# Patient Record
Sex: MALE | Race: WHITE | NOT HISPANIC OR LATINO
[De-identification: names, ages, dates, MRNs, and addresses within clinical notes are randomized per-mention and may not be internally consistent; named-entity substitution may affect disease eponyms.]

---

## 2017-07-06 ENCOUNTER — RESULT REVIEW (OUTPATIENT)
Age: 37
End: 2017-07-06

## 2017-07-06 ENCOUNTER — LABORATORY RESULT (OUTPATIENT)
Age: 37
End: 2017-07-06

## 2017-07-07 ENCOUNTER — APPOINTMENT (OUTPATIENT)
Dept: UROLOGY | Facility: CLINIC | Age: 37
End: 2017-07-07

## 2017-07-07 VITALS
TEMPERATURE: 98.1 F | DIASTOLIC BLOOD PRESSURE: 85 MMHG | HEIGHT: 68 IN | BODY MASS INDEX: 28.04 KG/M2 | OXYGEN SATURATION: 98 % | HEART RATE: 75 BPM | SYSTOLIC BLOOD PRESSURE: 154 MMHG | WEIGHT: 185 LBS

## 2017-07-14 ENCOUNTER — APPOINTMENT (OUTPATIENT)
Dept: UROLOGY | Facility: CLINIC | Age: 37
End: 2017-07-14

## 2017-10-02 ENCOUNTER — APPOINTMENT (OUTPATIENT)
Dept: UROLOGY | Facility: CLINIC | Age: 37
End: 2017-10-02
Payer: COMMERCIAL

## 2017-10-02 DIAGNOSIS — N45.1 EPIDIDYMITIS: ICD-10-CM

## 2017-10-02 DIAGNOSIS — Z30.2 ENCOUNTER FOR STERILIZATION: ICD-10-CM

## 2017-10-02 PROCEDURE — 99024 POSTOP FOLLOW-UP VISIT: CPT

## 2018-02-05 ENCOUNTER — TRANSCRIPTION ENCOUNTER (OUTPATIENT)
Age: 38
End: 2018-02-05

## 2018-03-30 ENCOUNTER — TRANSCRIPTION ENCOUNTER (OUTPATIENT)
Age: 38
End: 2018-03-30

## 2020-06-23 ENCOUNTER — APPOINTMENT (OUTPATIENT)
Dept: RHEUMATOLOGY | Facility: CLINIC | Age: 40
End: 2020-06-23
Payer: COMMERCIAL

## 2020-06-23 VITALS
TEMPERATURE: 98.3 F | HEART RATE: 85 BPM | SYSTOLIC BLOOD PRESSURE: 100 MMHG | HEIGHT: 68 IN | WEIGHT: 190 LBS | DIASTOLIC BLOOD PRESSURE: 72 MMHG | BODY MASS INDEX: 28.79 KG/M2 | OXYGEN SATURATION: 96 %

## 2020-06-23 DIAGNOSIS — Z84.0 FAMILY HISTORY OF DISEASES OF THE SKIN AND SUBCUTANEOUS TISSUE: ICD-10-CM

## 2020-06-23 DIAGNOSIS — Z82.69 FAMILY HISTORY OF OTHER DISEASES OF THE MUSCULOSKELETAL SYSTEM AND CONNECTIVE TISSUE: ICD-10-CM

## 2020-06-23 PROCEDURE — 99244 OFF/OP CNSLTJ NEW/EST MOD 40: CPT

## 2020-06-23 RX ORDER — DOXYCYCLINE 100 MG/1
100 TABLET, FILM COATED ORAL
Qty: 14 | Refills: 0 | Status: DISCONTINUED | COMMUNITY
Start: 2017-07-07 | End: 2020-06-23

## 2020-06-23 RX ORDER — DOXYCYCLINE 100 MG/1
100 TABLET, FILM COATED ORAL
Qty: 20 | Refills: 0 | Status: DISCONTINUED | COMMUNITY
Start: 2017-10-02 | End: 2020-06-23

## 2020-06-23 RX ORDER — OXYCODONE AND ACETAMINOPHEN 5; 325 MG/1; MG/1
5-325 TABLET ORAL
Qty: 12 | Refills: 0 | Status: DISCONTINUED | COMMUNITY
Start: 2017-07-07 | End: 2020-06-23

## 2020-06-24 PROBLEM — Z84.0 FAMILY HISTORY OF PSORIASIS: Status: ACTIVE | Noted: 2020-06-24

## 2020-06-24 PROBLEM — Z82.69 FAMILY HISTORY OF GOUT: Status: ACTIVE | Noted: 2020-06-24

## 2020-06-24 RX ORDER — HYDROCHLOROTHIAZIDE 12.5 MG/1
12.5 TABLET ORAL DAILY
Refills: 0 | Status: ACTIVE | COMMUNITY

## 2020-06-24 RX ORDER — LISINOPRIL 20 MG/1
20 TABLET ORAL
Qty: 180 | Refills: 3 | Status: ACTIVE | COMMUNITY

## 2020-06-24 RX ORDER — METHYLPREDNISOLONE 4 MG/1
4 TABLET ORAL
Refills: 0 | Status: DISCONTINUED | COMMUNITY
End: 2020-06-24

## 2020-06-24 NOTE — PHYSICAL EXAM
[General Appearance - In No Acute Distress] : in no acute distress [General Appearance - Alert] : alert [General Appearance - Well Nourished] : well nourished [General Appearance - Well-Appearing] : healthy appearing [General Appearance - Well Developed] : well developed [PERRL With Normal Accommodation] : pupils were equal in size, round, and reactive to light [Extraocular Movements] : extraocular movements were intact [Sclera] : the sclera and conjunctiva were normal [Oropharynx] : the oropharynx was normal [Outer Ear] : the ears and nose were normal in appearance [Neck Appearance] : the appearance of the neck was normal [Neck Cervical Mass (___cm)] : no neck mass was observed [Heart Sounds] : normal S1 and S2 [Heart Rate And Rhythm] : heart rate was normal and rhythm regular [Auscultation Breath Sounds / Voice Sounds] : lungs were clear to auscultation bilaterally [Murmurs] : no murmurs [Heart Sounds Gallop] : no gallops [Bowel Sounds] : normal bowel sounds [Heart Sounds Pericardial Friction Rub] : no pericardial rub [Abdomen Tenderness] : non-tender [Abdomen Soft] : soft [Abdomen Mass (___ Cm)] : no abdominal mass palpated [Cervical Lymph Nodes Enlarged Posterior Bilaterally] : posterior cervical [Cervical Lymph Nodes Enlarged Anterior Bilaterally] : anterior cervical [Supraclavicular Lymph Nodes Enlarged Bilaterally] : supraclavicular [No CVA Tenderness] : no ~M costovertebral angle tenderness [No Spinal Tenderness] : no spinal tenderness [Abnormal Walk] : normal gait [Nail Clubbing] : no clubbing  or cyanosis of the fingernails [Musculoskeletal - Swelling] : no joint swelling seen [Motor Tone] : muscle strength and tone were normal [Skin Color & Pigmentation] : normal skin color and pigmentation [Skin Turgor] : normal skin turgor [] : no rash [Sensation] : the sensory exam was normal to light touch and pinprick [No Focal Deficits] : no focal deficits [Deep Tendon Reflexes (DTR)] : deep tendon reflexes were 2+ and symmetric [FreeTextEntry1] : 5/5 str [Oriented To Time, Place, And Person] : oriented to person, place, and time [Affect] : the affect was normal [Impaired Insight] : insight and judgment were intact

## 2020-06-24 NOTE — HISTORY OF PRESENT ILLNESS
[FreeTextEntry1] : 40 yo .. w/ new onset severe pain and swelling in R 1st MTP recurrently in past month.  \par Immediate and nearly full resolution with steroids.  Started on Colchicine once daily, but pain/ swelling returned, another course of steroids needed.  Initial episode 1 m ago... \par Hx in past 2 months sudden onset L shoulder pain/ stiffness spontaneous onset and resolution within 1 wks- MRI + subacromial bursitis.. \par Similar sudden onset R lateral hip pain severe for several days\par ROS otherwise denies inflammatory eye, back, bowel or rash. Denies renal calculi, tophi \par nail deformities.  No constitutional sx and Wt stable for past 15 lbs\par .  \par FH significant for:  \par - gout + FH father and MGM.. \par - Psoriasis:  brother, 1st cousin \par - T2DM both parents \par \par PMH:  \par HTN mild, well controlled \par On HCTZ/ lisinopril for HTN well controlled \par

## 2020-06-24 NOTE — ASSESSMENT
[FreeTextEntry1] : 40 yo .. w/ new onset severe pain and swelling in R 1st MTP recurrently in past month.  \par \par 1) Inflammatory arthropathy:  monoarticular pattern new onset 4/20... 1st episode R 1st MTP but also L shoulder and R lateral hip.  FH significant for both Gout and PsA.. No evidence of psoriasis, inflammatory eye, back  or nail deformities.  No tophi \par Until recently denies recurrent enthesopathies.  \par Baseline labs 6/20 UA 7.0 nl CMP.  \par NOTE: \par Hx in past 2 months sudden onset L shoulder pain/ stiffness spontaneous onset and resolution within 1 wks- MRI + subacromial bursitis.. \par Similar sudden onset R lateral hip pain severe for several days\par \par DIscussion:  unclear if this is gout or PsA sin psoriasis.  Will monitor over time.  Both would respond to steroids but definitive tx is dependent on underlying etiology.  For short term will use steroids as needed, track frequency and get updated labs when not flaring... as UA is often lower in middle of gout attack.  \par Hx significant for possible metabolic syndrome w/ HTN- will assess for T2DM/ and lipids which would support gouty arthropathy.  \par \par \par Plan: \par - Prednisone 20mg tabs 2 tabs for 1-5 days and call office to let me know.. Never more than 5 days and not more often than once mnth\par \par - for now continue colchicine once daily \par \par - labs prior to next visit \par \par - return in 1 month\par \par - sign up for Follow My Health..

## 2020-06-24 NOTE — REVIEW OF SYSTEMS
[As Noted in HPI] : as noted in HPI [Joint Pain] : joint pain [Joint Swelling] : joint swelling [Joint Stiffness] : joint stiffness [Negative] : Endocrine

## 2020-06-24 NOTE — CONSULT LETTER
[Dear  ___] : Dear  [unfilled], [Consult Letter:] : I had the pleasure of evaluating your patient, [unfilled]. [Consult Closing:] : Thank you very much for allowing me to participate in the care of this patient.  If you have any questions, please do not hesitate to contact me. [Sincerely,] : Sincerely, [FreeTextEntry2] : Bryan Reilly MD  [FreeTextEntry3] : Angie Leslie DNP, ANP-C\par Division or Rheumatology\par [facility]\par \par  [FreeTextEntry1] : Please see below for summary of  recent rheumatology evaluation and recommendations.\par \par 40 yo .. w/ new onset severe pain and swelling in R 1st MTP recurrently in past month.  \par \par 1) Inflammatory arthropathy:  monoarticular pattern new onset 4/20... 1st episode R 1st MTP but also L shoulder and R lateral hip.  FH significant for both Gout and PsA.. No evidence of psoriasis, inflammatory eye, back  or nail deformities.  No tophi \par Until recently denies recurrent enthesopathies.  \par Baseline labs 6/20 UA 7.0 nl CMP.  \par NOTE: \par Hx in past 2 months sudden onset L shoulder pain/ stiffness spontaneous onset and resolution within 1 wks- MRI + subacromial bursitis.. \par Similar sudden onset R lateral hip pain severe for several days\par \par DIscussion:  unclear if this is gout or PsA sin psoriasis.  Will monitor over time.  Both would respond to steroids but definitive tx is dependent on underlying etiology.  For short term will use steroids as needed, track frequency and get updated labs when not flaring... as UA is often lower in middle of gout attack.  \par Hx significant for possible metabolic syndrome w/ HTN- will assess for T2DM/ and lipids which would support gouty arthropathy.  \par \par \par Plan: \par - Prednisone 20mg tabs 2 tabs for 1-5 days and call office to let me know.. Never more than 5 days and not more often than once mnth\par \par - for now continue colchicine once daily \par \par - labs prior to next visit \par \par - return in 1 month\par \par - sign up for Follow My Health..

## 2020-06-27 ENCOUNTER — APPOINTMENT (OUTPATIENT)
Dept: DISASTER EMERGENCY | Facility: CLINIC | Age: 40
End: 2020-06-27

## 2020-06-27 DIAGNOSIS — Z01.818 ENCOUNTER FOR OTHER PREPROCEDURAL EXAMINATION: ICD-10-CM

## 2020-06-28 LAB — SARS-COV-2 N GENE NPH QL NAA+PROBE: NOT DETECTED

## 2020-07-13 ENCOUNTER — APPOINTMENT (OUTPATIENT)
Dept: UROLOGY | Facility: CLINIC | Age: 40
End: 2020-07-13

## 2020-07-27 LAB
ALBUMIN SERPL ELPH-MCNC: 5 G/DL
ALP BLD-CCNC: 69 U/L
ALT SERPL-CCNC: 59 U/L
ANION GAP SERPL CALC-SCNC: 16 MMOL/L
APPEARANCE: CLEAR
AST SERPL-CCNC: 26 U/L
BASOPHILS # BLD AUTO: 0.04 K/UL
BASOPHILS NFR BLD AUTO: 0.8 %
BILIRUB SERPL-MCNC: 0.4 MG/DL
BILIRUBIN URINE: NEGATIVE
BLOOD URINE: NEGATIVE
BUN SERPL-MCNC: 15 MG/DL
CALCIUM SERPL-MCNC: 10.3 MG/DL
CCP AB SER IA-ACNC: >250 UNITS
CHLORIDE SERPL-SCNC: 95 MMOL/L
CHOLEST SERPL-MCNC: 240 MG/DL
CHOLEST/HDLC SERPL: 5.3 RATIO
CO2 SERPL-SCNC: 25 MMOL/L
COLOR: NORMAL
CREAT SERPL-MCNC: 1.02 MG/DL
CRP SERPL-MCNC: 0.2 MG/DL
EOSINOPHIL # BLD AUTO: 0.16 K/UL
EOSINOPHIL NFR BLD AUTO: 3.2 %
ERYTHROCYTE [SEDIMENTATION RATE] IN BLOOD BY WESTERGREN METHOD: 20 MM/HR
ESTIMATED AVERAGE GLUCOSE: 123 MG/DL
GLUCOSE QUALITATIVE U: NEGATIVE
GLUCOSE SERPL-MCNC: 107 MG/DL
HBA1C MFR BLD HPLC: 5.9 %
HCT VFR BLD CALC: 44 %
HDLC SERPL-MCNC: 45 MG/DL
HGB BLD-MCNC: 14.1 G/DL
HLA-B27 RELATED AG QL: NORMAL
IMM GRANULOCYTES NFR BLD AUTO: 0.4 %
KETONES URINE: NEGATIVE
LDLC SERPL CALC-MCNC: 160 MG/DL
LEUKOCYTE ESTERASE URINE: NEGATIVE
LYMPHOCYTES # BLD AUTO: 1.95 K/UL
LYMPHOCYTES NFR BLD AUTO: 39.6 %
MAN DIFF?: NORMAL
MCHC RBC-ENTMCNC: 27 PG
MCHC RBC-ENTMCNC: 32 GM/DL
MCV RBC AUTO: 84.3 FL
MONOCYTES # BLD AUTO: 0.66 K/UL
MONOCYTES NFR BLD AUTO: 13.4 %
NEUTROPHILS # BLD AUTO: 2.1 K/UL
NEUTROPHILS NFR BLD AUTO: 42.6 %
NITRITE URINE: NEGATIVE
PH URINE: 7
PLATELET # BLD AUTO: 328 K/UL
POTASSIUM SERPL-SCNC: 4.6 MMOL/L
PROT SERPL-MCNC: 7.4 G/DL
PROTEIN URINE: NORMAL
RBC # BLD: 5.22 M/UL
RBC # FLD: 13.9 %
RF+CCP IGG SER-IMP: ABNORMAL
RHEUMATOID FACT SER QL: 28 IU/ML
SODIUM SERPL-SCNC: 136 MMOL/L
SPECIFIC GRAVITY URINE: 1.02
TRIGL SERPL-MCNC: 171 MG/DL
URATE SERPL-MCNC: 8.8 MG/DL
UROBILINOGEN URINE: NORMAL
WBC # FLD AUTO: 4.93 K/UL

## 2020-07-31 ENCOUNTER — APPOINTMENT (OUTPATIENT)
Dept: RHEUMATOLOGY | Facility: CLINIC | Age: 40
End: 2020-07-31
Payer: COMMERCIAL

## 2020-07-31 VITALS
OXYGEN SATURATION: 97 % | HEIGHT: 68 IN | WEIGHT: 185 LBS | SYSTOLIC BLOOD PRESSURE: 100 MMHG | BODY MASS INDEX: 28.04 KG/M2 | TEMPERATURE: 96.7 F | HEART RATE: 103 BPM | DIASTOLIC BLOOD PRESSURE: 70 MMHG

## 2020-07-31 PROCEDURE — 99214 OFFICE O/P EST MOD 30 MIN: CPT

## 2020-08-02 RX ORDER — COLCHICINE 0.6 MG/1
0.6 TABLET ORAL
Qty: 60 | Refills: 2 | Status: DISCONTINUED | COMMUNITY
Start: 1900-01-01 | End: 2020-08-02

## 2020-08-02 NOTE — PHYSICAL EXAM
[General Appearance - Alert] : alert [General Appearance - Well Nourished] : well nourished [General Appearance - Well Developed] : well developed [General Appearance - In No Acute Distress] : in no acute distress [Sclera] : the sclera and conjunctiva were normal [General Appearance - Well-Appearing] : healthy appearing [PERRL With Normal Accommodation] : pupils were equal in size, round, and reactive to light [Extraocular Movements] : extraocular movements were intact [Auscultation Breath Sounds / Voice Sounds] : lungs were clear to auscultation bilaterally [Heart Rate And Rhythm] : heart rate was normal and rhythm regular [Heart Sounds] : normal S1 and S2 [Murmurs] : no murmurs [Heart Sounds Gallop] : no gallops [Heart Sounds Pericardial Friction Rub] : no pericardial rub [No Spinal Tenderness] : no spinal tenderness [No CVA Tenderness] : no ~M costovertebral angle tenderness [Abnormal Walk] : normal gait [Musculoskeletal - Swelling] : no joint swelling seen [Motor Tone] : muscle strength and tone were normal [Nail Clubbing] : no clubbing  or cyanosis of the fingernails [Skin Turgor] : normal skin turgor [Skin Color & Pigmentation] : normal skin color and pigmentation [Deep Tendon Reflexes (DTR)] : deep tendon reflexes were 2+ and symmetric [] : no rash [No Focal Deficits] : no focal deficits [Sensation] : the sensory exam was normal to light touch and pinprick [Oriented To Time, Place, And Person] : oriented to person, place, and time [Affect] : the affect was normal [Impaired Insight] : insight and judgment were intact [Edema] : there was no peripheral edema [FreeTextEntry1] : 5/5 str

## 2020-08-02 NOTE — CONSULT LETTER
[Dear  ___] : Dear  [unfilled], [Referral Closing:] : Thank you very much for seeing this patient.  If you have any questions, please do not hesitate to contact me. [Consult Letter:] : I had the pleasure of evaluating your patient, [unfilled]. [FreeTextEntry2] : Bryan Reilly MD  [Sincerely,] : Sincerely, [FreeTextEntry3] : Angie Leslie DNP, ANP-C\par Division or Rheumatology\par [facility]\par \par  [FreeTextEntry1] : Please see below for summary of  recent rheumatology evaluation and recommendations.\par \par 40 yo .. w/ new onset severe pain and swelling in R 1st MTP recurrently in past month.  \par \par 1) Inflammatory arthropathy:  monoarticular pattern new onset 4/20... 1st episode R 1st MTP but also L shoulder and R lateral hip- R elbow, L1 MTP.  Since then despite 0.6 mg colchicine daily continues to have migratory monoarthritis now L 1st MTP and R elbow.  \par Serologies suggestive RA with high titer CCP > 250, RF 28 but pattern suggestive SpA...neg HLAB27 ? psoriasis ++ FH vs Reiters (hx epidymitis though denied STD)... \par Gout UA 8.8 with + metabolic syndrome \par Will get [employer] CTscan to r/o crystalline arthropathy L foot- though very early in disease process may not see anything. \par FH significant for both Gout and PsA.. No evidence of psoriasis- but did have rash 6 m ago on leg seen by derm dx eczema + response to steroids, inflammatory eye, back symptoms. Denies renal calculi\par Nail dystrophy possible pitting  No tophi  \par NOTE:   \par Baseline labs 6/20 UA 7.0 nl CMP.  updated UA 8.8\par Hx in past 4 months sudden onset L shoulder pain/ stiffness spontaneous onset and resolution within 1 wks- MRI + subacromial bursitis.. \par Similar sudden onset R lateral hip pain severe for several days\par \par DIscussion:  unclear if this is gout or PsA sin psoriasis or RA.  \par Will monitor over time and start SSA- caution with MTX given NAFLD with elevated ALT at baseline \par May need to consider tx for gout in future but will wait for clarity.. CTScan should help\par Ok to use PRN lower dose pred for flares while titrating onto SSA... Should respond to 10-20 mg pred if RA or PsA\par Will also get SI joint films now. \par \par 2)  metabolic syndrome w/ HTN- will assess for T2DM/ and lipids and NAFLD \par this certainly supports risk for gouty arthropathy... \par \par \par Plan: \par - Prednisone 20mg tabs 1 tabs for 1-5 days and call office to let me know.. Never more than 5 days without letting me know.  \par \par - Start Azulfidine (sulfasalizine) 500 mg tabs start once daily and increase each week as tolerated to max of 2 tabs... twice daily.  If any problems let me know:  rash, fever, feeling like getting the flu.. \par \par - labs prior to next visit \par \par - imaging SI joints and CTscan of L foot to rule out crystals \par \par - rto 1 m\par \par - sign up for Follow My Health.. \par \par Patient seen and examined with Dr. Richard today who agrees with above plans

## 2020-08-02 NOTE — HISTORY OF PRESENT ILLNESS
[FreeTextEntry1] : 7/31/20 \par - labs + RF 28 w/ CCP > 250, Uric acid 8.8 w/ neg HLAB27\par - has been on 0.6 mg Colchicine daily since last visit but continued to have migratory arthropathy R elbow, L foot since last visit, immediately responded to steroids x 1 day - 40 mg ... \par - Nail pitting noted and now remembers rash on shin- dry scaly but doesn't have image, was seen by Derm dx eczema topical steroids + response \par \par 1) Pauciarthropathy:  \par 2) Metabolic syndrome:  elevated Lipids, HbA1c and UA 8.8\par ___________________________________________________________________________-\par \par \par (Initial HPI 6/23/20) \par 40 yo .. w/ new onset severe pain and swelling in R 1st MTP recurrently in past month.  \par Immediate and nearly full resolution with steroids.  Started on Colchicine once daily, but pain/ swelling returned, another course of steroids needed.  Initial episode 1 m ago... \par Hx in past 2 months sudden onset L shoulder pain/ stiffness spontaneous onset and resolution within 1 wks- MRI + subacromial bursitis.. \par Similar sudden onset R lateral hip pain severe for several days\par ROS otherwise denies inflammatory eye, back, bowel or rash. Denies renal calculi, tophi \par nail deformities.  No constitutional sx and Wt stable for past 15 lbs\par .  \par FH significant for:  \par - gout + FH father and MGM.. \par - Psoriasis:  brother, 1st cousin \par - T2DM both parents \par \par PMH:  \par HTN mild, well controlled \par On HCTZ/ lisinopril for HTN well controlled \par

## 2020-08-02 NOTE — ASSESSMENT
[FreeTextEntry1] : 40 yo .. w/ new onset severe pain and swelling in R 1st MTP recurrently in past month.  \par \par 1) Inflammatory arthropathy:  monoarticular pattern new onset 4/20... 1st episode R 1st MTP but also L shoulder and R lateral hip- R elbow, L1 MTP.  Since then despite 0.6 mg colchicine daily continues to have migratory monoarthritis now L 1st MTP and R elbow.  \par Serologies suggestive RA with high titer CCP > 250, RF 28 but pattern suggestive SpA...neg HLAB27 ? psoriasis ++ FH vs Reiters (hx epidymitis though denied STD)... \par Gout UA 8.8 with + metabolic syndrome \par Will get [employer] CTscan to r/o crystalline arthropathy L foot- though very early in disease process may not see anything. \par FH significant for both Gout and PsA.. No evidence of psoriasis- but did have rash 6 m ago on leg seen by derm dx eczema + response to steroids, inflammatory eye, back symptoms. Denies renal calculi\par Nail dystrophy possible pitting  No tophi  \par NOTE:   \par Baseline labs 6/20 UA 7.0 nl CMP.  updated UA 8.8\par Hx in past 4 months sudden onset L shoulder pain/ stiffness spontaneous onset and resolution within 1 wks- MRI + subacromial bursitis.. \par Similar sudden onset R lateral hip pain severe for several days\par \par DIscussion:  unclear if this is gout or PsA sin psoriasis or RA.  \par Will monitor over time and start SSA- caution with MTX given NAFLD with elevated ALT at baseline \par May need to consider tx for gout in future but will wait for clarity.. CTScan should help\par Ok to use PRN lower dose pred for flares while titrating onto SSA... Should respond to 10-20 mg pred if RA or PsA\par Will also get SI joint films now. \par \par 2)  metabolic syndrome w/ HTN- will assess for T2DM/ and lipids and NAFLD \par this certainly supports risk for gouty arthropathy... \par \par \par Plan: \par - Prednisone 20mg tabs 1 tabs for 1-5 days and call office to let me know.. Never more than 5 days without letting me know.  \par \par - Start Azulfidine (sulfasalizine) 500 mg tabs start once daily and increase each week as tolerated to max of 2 tabs... twice daily.  If any problems let me know:  rash, fever, feeling like getting the flu.. \par \par - labs prior to next visit \par \par - imaging SI joints and CTscan of L foot to rule out crystals \par \par - rto 1 m\par \par - sign up for Follow My Health.. \par \par Patient seen and examined with Dr. Richard today who agrees with above plans

## 2020-08-02 NOTE — REVIEW OF SYSTEMS
[As Noted in HPI] : as noted in HPI [Joint Pain] : joint pain [Joint Swelling] : joint swelling [Joint Stiffness] : joint stiffness [Negative] : Heme/Lymph [FreeTextEntry7] : no sx of inflammatory bowel dz [FreeTextEntry3] : no hx inflammatory eye dz  [FreeTextEntry8] : denies previous STD  [de-identified] : ? nail abn and possible rash on R shin months ago- nothing now

## 2020-09-22 LAB
ALBUMIN SERPL ELPH-MCNC: 4.9 G/DL
ALP BLD-CCNC: 84 U/L
ALT SERPL-CCNC: 47 U/L
ANION GAP SERPL CALC-SCNC: 14 MMOL/L
AST SERPL-CCNC: 23 U/L
BASOPHILS # BLD AUTO: 0.04 K/UL
BASOPHILS NFR BLD AUTO: 0.8 %
BILIRUB SERPL-MCNC: 0.3 MG/DL
BUN SERPL-MCNC: 13 MG/DL
CALCIUM SERPL-MCNC: 10.4 MG/DL
CCP AB SER IA-ACNC: >250 UNITS
CHLORIDE SERPL-SCNC: 95 MMOL/L
CO2 SERPL-SCNC: 24 MMOL/L
CREAT SERPL-MCNC: 0.97 MG/DL
CRP SERPL-MCNC: 0.47 MG/DL
EOSINOPHIL # BLD AUTO: 0.15 K/UL
EOSINOPHIL NFR BLD AUTO: 2.9 %
ERYTHROCYTE [SEDIMENTATION RATE] IN BLOOD BY WESTERGREN METHOD: 19 MM/HR
GLUCOSE SERPL-MCNC: 117 MG/DL
HCT VFR BLD CALC: 43.4 %
HGB BLD-MCNC: 13.8 G/DL
IMM GRANULOCYTES NFR BLD AUTO: 0.6 %
LYMPHOCYTES # BLD AUTO: 1.12 K/UL
LYMPHOCYTES NFR BLD AUTO: 21.7 %
MAN DIFF?: NORMAL
MCHC RBC-ENTMCNC: 27.7 PG
MCHC RBC-ENTMCNC: 31.8 GM/DL
MCV RBC AUTO: 87 FL
MONOCYTES # BLD AUTO: 0.48 K/UL
MONOCYTES NFR BLD AUTO: 9.3 %
NEUTROPHILS # BLD AUTO: 3.33 K/UL
NEUTROPHILS NFR BLD AUTO: 64.7 %
PLATELET # BLD AUTO: 295 K/UL
POTASSIUM SERPL-SCNC: 4.2 MMOL/L
PROT SERPL-MCNC: 7.1 G/DL
RBC # BLD: 4.99 M/UL
RBC # FLD: 13.7 %
RF+CCP IGG SER-IMP: ABNORMAL
RHEUMATOID FACT SER QL: 40 IU/ML
SODIUM SERPL-SCNC: 134 MMOL/L
URATE SERPL-MCNC: 7.5 MG/DL
WBC # FLD AUTO: 5.15 K/UL

## 2020-09-25 ENCOUNTER — APPOINTMENT (OUTPATIENT)
Dept: RHEUMATOLOGY | Facility: CLINIC | Age: 40
End: 2020-09-25
Payer: COMMERCIAL

## 2020-09-25 VITALS
HEIGHT: 68 IN | HEART RATE: 94 BPM | WEIGHT: 190 LBS | TEMPERATURE: 96.6 F | OXYGEN SATURATION: 98 % | DIASTOLIC BLOOD PRESSURE: 80 MMHG | SYSTOLIC BLOOD PRESSURE: 120 MMHG | BODY MASS INDEX: 28.79 KG/M2

## 2020-09-25 PROCEDURE — 99214 OFFICE O/P EST MOD 30 MIN: CPT

## 2020-09-25 NOTE — ASSESSMENT
[FreeTextEntry1] : 39 yo .. w/ new onset migratory pauciarticular (monoarthritis) asymmetrical inflammatory arthritis with w/u + RF 40, CCP > 250   \par \par 1) Inflammatory arthropathy: + RF 40, CCP > 250, neg HLAB27, sudden onset monoarticular pattern new onset 4/20... 1st episode R 1st MTP but also L shoulder (SAB) and R lateral hip- R elbow, L1 MTP- several PIP joints despite 0.6 mg colchicine daily BID and recently added SSA 2000 mg daily.. most recent episode yestr, always responsive to 40 mg pred x 1-3 days. Tolerating all meds w/out issues \par  ? psoriasis + FH  (in past - likely eczema)  vs Reiters (hx epidymitis though denied STD)... and many ys ago\par Gout UA 8.8 with + metabolic syndrome possible, ordered [employer] CTscan to r/o crystalline arthropathy L foot- though very early in disease process may not see anything. \par Nail dystrophy possible pitting  No tophi  \par NOTE: \par FH significant for both Gout and PsA..\par No personal of psoriasis, AS, inflammatory back, bowel or eye dz\par Denies renal calculi\par Baseline labs 6/20 UA 7.0 nl CMP.  updated UA 8.8. \par \par DIscussion:  \par unclear if this is gout or PsA sin psoriasis or RA.  \par Will monitor over time.  Started SSA 2 gm but after 2 months still active inflammatory episodes.. in migratory asymmetrical pattern... \par - caution with MTX given NAFLD with elevated ALT at baseline \par May need to consider tx for gout in future but will wait for clarity.. CTScan should help\par Ok to use PRN lower dose pred for flares while titrating onto SSA... Should respond to 10-20 mg pred if RA or PsA\par Will also get SI joint films now. \par Will start with ORencia given NAFLd.. would want to avoid TNFi, IL6 and Nicki... ideally \par \par 2)  metabolic syndrome:   needs to review with PCP \par - HTN: well controlled on treatment \par - overweight BMI 28\par - NAFLD:  persistently elevated ALT 45-50 \par - HLD: , , HD 45,  w/ ratio 5.3 \par - HbA1c 5.9  \par \par \par Plan: \par - Prednisone 20mg tabs 1 tabs for 1-3 days and call office to let me know.. Never more than 3 days without letting me know.  Try to minimize use..  \par \par - continue Azulfidine (sulfasalizine) 500 mg at 2 gm daily for now, if + response to Orencia may be able to stop in future. \par \par - labs prior to next visit \par \par - imaging SI joints and CTscan of L foot to rule out crystals- [employer] CTscan\par \par - literature provided for biologic tx, given issues w/ NAFLD, elevated LFTs will start with Orencia.. discussed infusion v. self injection, will do self injection. Needs hep b/c and QFT now..  \par Come in to office when available for demonstration self injection. \par \par - rto 2m\par \par - sign up for Follow My Health..

## 2020-09-25 NOTE — HISTORY OF PRESENT ILLNESS
[FreeTextEntry1] : 9/25/20\par - recent L great toe swelling - \par - recurrent episodes R 2, L 3, and L knee acute swelling immediate response to steroids..  spontaneous onset and associated \par In past few mnths 4 episodes.. few days \par - labs + RF 28 w/ CCP > 250 x 2, Uric acid 8.8-> 7.5 w/ neg HLAB27 \par - MRI L shoulder 6/20 nl w/ no erosion, effusion or enthesopathy\par - sx always reapond to low dose pred.. .. \par - Nail pitting noted and now remembers rash on shin- dry scaly but doesn't have image, was seen by Derm dx eczema topical steroids + response \par \par 1) Pauciarthropathy:  \par 2) Metabolic syndrome:  elevated Lipids, HbA1c and UA 8.8\par ___________________________________________________________________________-\par \par \par (Initial HPI 6/23/20) \par 38 yo .. w/ new onset severe pain and swelling in R 1st MTP recurrently in past month.  \par Immediate and nearly full resolution with steroids.  Started on Colchicine once daily, but pain/ swelling returned, another course of steroids needed.  Initial episode 1 m ago... \par Hx in past 2 months sudden onset L shoulder pain/ stiffness spontaneous onset and resolution within 1 wks- MRI + subacromial bursitis.. \par Similar sudden onset R lateral hip pain severe for several days\par ROS otherwise denies inflammatory eye, back, bowel or rash. Denies renal calculi, tophi \par nail deformities.  No constitutional sx and Wt stable for past 15 lbs\par .  \par FH significant for:  \par - gout + FH father and MGM.. \par - Psoriasis:  brother, 1st cousin \par - T2DM both parents \par \par PMH:  \par HTN mild, well controlled \par On HCTZ/ lisinopril for HTN well controlled \par

## 2020-09-25 NOTE — PHYSICAL EXAM
[General Appearance - Alert] : alert [General Appearance - In No Acute Distress] : in no acute distress [General Appearance - Well Nourished] : well nourished [General Appearance - Well Developed] : well developed [General Appearance - Well-Appearing] : healthy appearing [Sclera] : the sclera and conjunctiva were normal [PERRL With Normal Accommodation] : pupils were equal in size, round, and reactive to light [Extraocular Movements] : extraocular movements were intact [Auscultation Breath Sounds / Voice Sounds] : lungs were clear to auscultation bilaterally [Heart Rate And Rhythm] : heart rate was normal and rhythm regular [Heart Sounds] : normal S1 and S2 [Heart Sounds Gallop] : no gallops [Murmurs] : no murmurs [Heart Sounds Pericardial Friction Rub] : no pericardial rub [Edema] : there was no peripheral edema [No CVA Tenderness] : no ~M costovertebral angle tenderness [No Spinal Tenderness] : no spinal tenderness [Abnormal Walk] : normal gait [Nail Clubbing] : no clubbing  or cyanosis of the fingernails [Musculoskeletal - Swelling] : no joint swelling seen [Motor Tone] : muscle strength and tone were normal [Skin Color & Pigmentation] : normal skin color and pigmentation [Skin Turgor] : normal skin turgor [] : no rash [Deep Tendon Reflexes (DTR)] : deep tendon reflexes were 2+ and symmetric [Sensation] : the sensory exam was normal to light touch and pinprick [No Focal Deficits] : no focal deficits [Oriented To Time, Place, And Person] : oriented to person, place, and time [Impaired Insight] : insight and judgment were intact [Affect] : the affect was normal [FreeTextEntry1] : 5/5 str

## 2020-09-25 NOTE — DATA REVIEWED
[FreeTextEntry1] : Labs: \par 6/20 RF 28, CCP > 250, ALT 59 (known NAFLD), UA 8.8, nl CRP, UA, ESR 20. Neg HLAB27, Covid\par HbA1c 5.9\par , HD 45, ,

## 2020-09-25 NOTE — CONSULT LETTER
[Dear  ___] : Dear  [unfilled], [Consult Letter:] : I had the pleasure of evaluating your patient, [unfilled]. [Referral Closing:] : Thank you very much for seeing this patient.  If you have any questions, please do not hesitate to contact me. [Sincerely,] : Sincerely, [FreeTextEntry2] : Bryan Reilly MD  [FreeTextEntry1] : Please see below for summary of  recent rheumatology evaluation and recommendations.\par \par 38 yo .. w/ new onset severe pain and swelling in R 1st MTP recurrently in past month.  \par \par 1) Inflammatory arthropathy:  monoarticular pattern new onset 4/20... 1st episode R 1st MTP but also L shoulder and R lateral hip- R elbow, L1 MTP.  Since then despite 0.6 mg colchicine daily continues to have migratory monoarthritis now L 1st MTP and R elbow.  \par Serologies suggestive RA with high titer CCP > 250, RF 28 but pattern suggestive SpA...neg HLAB27 ? psoriasis ++ FH vs Reiters (hx epidymitis though denied STD)... \par Gout UA 8.8 with + metabolic syndrome \par Will get [employer] CTscan to r/o crystalline arthropathy L foot- though very early in disease process may not see anything. \par FH significant for both Gout and PsA.. No evidence of psoriasis- but did have rash 6 m ago on leg seen by derm dx eczema + response to steroids, inflammatory eye, back symptoms. Denies renal calculi\par Nail dystrophy possible pitting  No tophi  \par NOTE:   \par Baseline labs 6/20 UA 7.0 nl CMP.  updated UA 8.8\par Hx in past 4 months sudden onset L shoulder pain/ stiffness spontaneous onset and resolution within 1 wks- MRI + subacromial bursitis.. \par Similar sudden onset R lateral hip pain severe for several days\par \par DIscussion:  unclear if this is gout or PsA sin psoriasis or RA.  \par Will monitor over time and start SSA- caution with MTX given NAFLD with elevated ALT at baseline \par May need to consider tx for gout in future but will wait for clarity.. CTScan should help\par Ok to use PRN lower dose pred for flares while titrating onto SSA... Should respond to 10-20 mg pred if RA or PsA\par Will also get SI joint films now. \par \par 2)  metabolic syndrome w/ HTN- will assess for T2DM/ and lipids and NAFLD \par this certainly supports risk for gouty arthropathy... \par \par \par Plan: \par - Prednisone 20mg tabs 1 tabs for 1-5 days and call office to let me know.. Never more than 5 days without letting me know.  \par \par - Start Azulfidine (sulfasalizine) 500 mg tabs start once daily and increase each week as tolerated to max of 2 tabs... twice daily.  If any problems let me know:  rash, fever, feeling like getting the flu.. \par \par - labs prior to next visit \par \par - imaging SI joints and CTscan of L foot to rule out crystals \par \par - rto 1 m\par \par - sign up for Follow My Health.. \par \par Patient seen and examined with Dr. Richard today who agrees with above plans  [FreeTextEntry3] : Angie Leslie DNP, ANP-C\par Division or Rheumatology\par [facility]\par \par

## 2020-09-25 NOTE — REVIEW OF SYSTEMS
[As Noted in HPI] : as noted in HPI [Joint Pain] : joint pain [Joint Swelling] : joint swelling [Joint Stiffness] : joint stiffness [Negative] : Heme/Lymph [FreeTextEntry3] : no hx inflammatory eye dz  [FreeTextEntry7] : no sx of inflammatory bowel dz [FreeTextEntry8] : denies previous STD  [FreeTextEntry9] : current sudden pauciarticular  [de-identified] : ? nail abn and possible rash on R shin months ago- nothing now

## 2020-10-05 LAB
HBV CORE IGG+IGM SER QL: NONREACTIVE
HBV SURFACE AB SER QL: REACTIVE
HBV SURFACE AG SER QL: NONREACTIVE
HCV AB SER QL: NONREACTIVE
HCV S/CO RATIO: 0.04 S/CO
M TB IFN-G BLD-IMP: NEGATIVE
QUANTIFERON TB PLUS MITOGEN MINUS NIL: 9.35 IU/ML
QUANTIFERON TB PLUS NIL: 0.01 IU/ML
QUANTIFERON TB PLUS TB1 MINUS NIL: 0.01 IU/ML
QUANTIFERON TB PLUS TB2 MINUS NIL: 0.01 IU/ML

## 2020-10-08 ENCOUNTER — APPOINTMENT (OUTPATIENT)
Dept: RHEUMATOLOGY | Facility: CLINIC | Age: 40
End: 2020-10-08
Payer: COMMERCIAL

## 2020-10-08 PROCEDURE — 99442: CPT

## 2020-10-08 RX ORDER — ABATACEPT 125 MG/ML
125 INJECTION, SOLUTION SUBCUTANEOUS
Qty: 3 | Refills: 3 | Status: DISCONTINUED | COMMUNITY
Start: 2020-09-25 | End: 2020-10-08

## 2020-10-21 ENCOUNTER — EMERGENCY (EMERGENCY)
Facility: HOSPITAL | Age: 40
LOS: 1 days | Discharge: HOME | End: 2020-10-21
Attending: EMERGENCY MEDICINE
Payer: COMMERCIAL

## 2020-10-21 VITALS
SYSTOLIC BLOOD PRESSURE: 132 MMHG | OXYGEN SATURATION: 97 % | HEART RATE: 90 BPM | TEMPERATURE: 99 F | RESPIRATION RATE: 20 BRPM | DIASTOLIC BLOOD PRESSURE: 76 MMHG | WEIGHT: 184.97 LBS | HEIGHT: 68 IN

## 2020-10-21 VITALS
RESPIRATION RATE: 18 BRPM | HEART RATE: 98 BPM | DIASTOLIC BLOOD PRESSURE: 86 MMHG | SYSTOLIC BLOOD PRESSURE: 123 MMHG | TEMPERATURE: 99 F | OXYGEN SATURATION: 100 %

## 2020-10-21 PROCEDURE — 73110 X-RAY EXAM OF WRIST: CPT | Mod: 26,RT

## 2020-10-21 PROCEDURE — 73130 X-RAY EXAM OF HAND: CPT

## 2020-10-21 PROCEDURE — 73110 X-RAY EXAM OF WRIST: CPT

## 2020-10-21 PROCEDURE — 73130 X-RAY EXAM OF HAND: CPT | Mod: 26,RT

## 2020-10-21 PROCEDURE — 73562 X-RAY EXAM OF KNEE 3: CPT

## 2020-10-21 PROCEDURE — 99284 EMERGENCY DEPT VISIT MOD MDM: CPT

## 2020-10-21 PROCEDURE — 96372 THER/PROPH/DIAG INJ SC/IM: CPT

## 2020-10-21 PROCEDURE — 73562 X-RAY EXAM OF KNEE 3: CPT | Mod: 26,LT

## 2020-10-21 PROCEDURE — 99284 EMERGENCY DEPT VISIT MOD MDM: CPT | Mod: 25

## 2020-10-21 RX ORDER — KETOROLAC TROMETHAMINE 30 MG/ML
15 SYRINGE (ML) INJECTION ONCE
Refills: 0 | Status: DISCONTINUED | OUTPATIENT
Start: 2020-10-21 | End: 2020-10-21

## 2020-10-21 RX ADMIN — Medication 15 MILLIGRAM(S): at 14:11

## 2020-10-21 RX ADMIN — Medication 15 MILLIGRAM(S): at 11:57

## 2020-10-21 NOTE — ED PROVIDER NOTE - CARE PLAN
Assessment and plan of treatment:	40 M with hx as above presenting for right hand, left knee and left side of neck pain x 1 hour. Low mechanism injury for neck pain and left knee pain. Patient ambulatory. Unlikely to have significant cervical spine injury or left knee dislocation/fracture. Likely 2/2 MSK pain at this time. Possible metacarpal fracture of hand vs contusion at this time. Plan for Xr and pain control.   Principal Discharge DX:	Hand injury, right, initial encounter  Assessment and plan of treatment:	40 M with hx as above presenting for right hand, left knee and left side of neck pain x 1 hour. Low mechanism injury for neck pain and left knee pain. Patient ambulatory. Unlikely to have significant cervical spine injury or left knee dislocation/fracture. Likely 2/2 MSK pain at this time. Possible metacarpal fracture of hand vs contusion at this time. Plan for Xr and pain control.

## 2020-10-21 NOTE — ED PROVIDER NOTE - PLAN OF CARE
40 M with hx as above presenting for right hand, left knee and left side of neck pain x 1 hour. Low mechanism injury for neck pain and left knee pain. Patient ambulatory. Unlikely to have significant cervical spine injury or left knee dislocation/fracture. Likely 2/2 MSK pain at this time. Possible metacarpal fracture of hand vs contusion at this time. Plan for Xr and pain control.
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
## 2020-10-21 NOTE — ED PROVIDER NOTE - PHYSICAL EXAMINATION
· Physical Examination: PHYSICAL EXAM:   · CONSTITUTIONAL:  Appearance: well appearing.  Development: well developed.  Distress: no apparent  · Manner: appropriate for situation.  Mentation: awake, alert, oriented to person, place, time/situation  · Mood: appropriate.  Nourishment: well  · Head Shape: normal cephalic, ATRAUMATIC  · EYES: bilateral normal, no discharge, redness or evidence of any abnormality  · Nose: clear Mouth: normal mucosa  · Throat: uvula midline, no vesicles, no redness, and no oropharyngeal exudate.  · CARDIAC:  CARDIAC RHYTHM: regular  CARDIAC RATE: normal  CARDIAC PEDAL EDEMA: absent  CARDIAC JVD: non-distended bilaterally  · CARDIAC PULSES: normal bilaterally  · RESPIRATORY:  Respiratory Distress: no  Breath Sounds: normal  · Chest Exam: normal, non-tender  · Abdominal Exam: soft, nondistended, nontender  · MUSCULOSKELETAL: Spine appears normal. Left sided paraspinal tenderness to palpation noted. No spinal point tenderness. Full ROM of cervical spine. Right hand: bruising noted over 4th/5th dorsal aspect of metacarpals.  Left Knee: No tenderness to palpation. No effusions. No valgus/varus stress noted. Anterior drawers negative.   · NEUROLOGICAL: Alert and oriented, no focal deficits, no motor or sensory deficits. Gross motor function nl in upper and lower extremities b/l. Ulnar, radial and medial nerve sensation intact in right upper and left upper extremity.   · SKIN: Skin normal color for race, warm, dry and intact. No evidence of rash.  · PSYCHIATRIC: Alert and oriented to person, place, time/situation. normal mood and affect. no apparent risk to self or others.  · HEME LYMPH: No adenopathy or splenomegaly. No cervical or inguinal lymphadenopathy. · Physical Examination: PHYSICAL EXAM:   · CONSTITUTIONAL:  Appearance: well appearing.  Development: well developed.  Distress: no apparent  · Manner: appropriate for situation.  Mentation: awake, alert, oriented to person, place, time/situation  · Mood: appropriate.  Nourishment: well  · Head Shape: normal cephalic, ATRAUMATIC  · EYES: bilateral normal, no discharge, redness or evidence of any abnormality  · Nose: clear Mouth: normal mucosa  · Throat: uvula midline, no vesicles, no redness, and no oropharyngeal exudate.  · CARDIAC:  CARDIAC RHYTHM: regular  CARDIAC RATE: normal  CARDIAC PEDAL EDEMA: absent  CARDIAC JVD: non-distended bilaterally  · CARDIAC PULSES: normal bilaterally  · RESPIRATORY:  Respiratory Distress: no  Breath Sounds: normal  · Abdominal Exam: soft, nondistended, nontender  · MUSCULOSKELETAL: Spine appears normal. Left sided paraspinal tenderness to palpation noted. No spinal point tenderness. Full ROM of cervical spine. Right hand: bruising noted over 4th/5th dorsal aspect of metacarpals.  Left Knee: No tenderness to palpation. No effusions. No valgus/varus stress noted. Anterior drawers negative.   · NEUROLOGICAL: Alert and oriented, no focal deficits, no motor or sensory deficits. Gross motor function nl in upper and lower extremities b/l. Ulnar, radial and medial nerve sensation intact in right upper and left upper extremity.   · SKIN: Skin normal color for race, warm, dry and intact. No evidence of rash.  · PSYCHIATRIC: Alert and oriented to person, place, time/situation. normal mood and affect. no apparent risk to self or others.  · HEME LYMPH: No adenopathy or splenomegaly. No cervical or inguinal lymphadenopathy.  Attending Lindy Wray: Gen; nad, heent: atraumatic, eomi, perrla, mmm, neck mild lateral left cervical spinal ttp, chest: nttp, cv: rrr, lungs: ctab, abd: soft, nontender, nondistended, ext: ecchymoses to proximal to 4tha nd 5 digits MCP. able to range, neuro: awake and alert following commands

## 2020-10-21 NOTE — ED PROVIDER NOTE - ATTENDING CONTRIBUTION TO CARE
Attending MD Lindy Wray:  I personally have seen and examined this patient.  Resident note reviewed and agree on plan of care and except where noted.  See HPI, PE, and MDM for details.

## 2020-10-21 NOTE — ED PROVIDER NOTE - PROGRESS NOTE DETAILS
XR grossly negative as per ED attending read. Patient notes he would like to go home now and does not want to wait for formal reads. Patient is aware that if reads come back with pathology he will be notified. ACE wrap applied to hand. Patient notes he feels better at this time. Patient stable for discharge and follow up with PMD.
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
## 2020-10-21 NOTE — ED PROVIDER NOTE - NSFOLLOWUPCLINICS_GEN_ALL_ED_FT
[facility]  Sports Medicine  [address] [place] [address]  Phone: [phone]  Fax:   Follow Up Time:
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
## 2020-10-21 NOTE — ED PROVIDER NOTE - NSFOLLOWUPINSTRUCTIONS_ED_ALL_ED_FT
You were evaluated in the Emergency Department for [INSERT CC HERE].  You were evaluated and examined by a physician, and you had [LIST: LABS, XRAYS, CT, US, ETC].      Based on your evaluation:___________     There are no signs of emergency conditions requiring admission to the hospital on today's workup.  Based on the evaluation, a presumptive diagnosis was made, however, further evaluation may be required by your primary care physician or a specialist for a more definitive diagnosis.  Therefore, please follow-up as directed or return to the Emergency Department if your symptoms change or worsen.    We recommend that you:  1. See your primary care physician within the next 72 hours for follow up.  Bring a copy of your discharge paperwork (including any test results) to your doctor.  2. Please follow up in the sports medicine clinic listed bellow if symptoms worsen.   3. Motrin/Tylenol and ice as need for pain        *** Return immediately if you have worsening pain or symptoms, or any other new/concerning symptoms. ***

## 2020-10-21 NOTE — ED PROVIDER NOTE - PATIENT PORTAL LINK FT
You can access the FollowMyHealth Patient Portal offered by [facility] by registering at the following website: [email or web address]. By joining [employer]’s FollowMyHealth portal, you will also be able to view your health information using other applications (apps) compatible with our system.

## 2020-12-01 LAB
ALT SERPL-CCNC: 40 U/L
AST SERPL-CCNC: 24 U/L
BASOPHILS # BLD AUTO: 0.03 K/UL
BASOPHILS NFR BLD AUTO: 0.6 %
EOSINOPHIL # BLD AUTO: 0.08 K/UL
EOSINOPHIL NFR BLD AUTO: 1.7 %
HCT VFR BLD CALC: 46.9 %
HGB BLD-MCNC: 15.1 G/DL
IMM GRANULOCYTES NFR BLD AUTO: 0.2 %
LYMPHOCYTES # BLD AUTO: 1.83 K/UL
LYMPHOCYTES NFR BLD AUTO: 39.4 %
MAN DIFF?: NORMAL
MCHC RBC-ENTMCNC: 27.7 PG
MCHC RBC-ENTMCNC: 32.2 GM/DL
MCV RBC AUTO: 86.1 FL
MONOCYTES # BLD AUTO: 0.67 K/UL
MONOCYTES NFR BLD AUTO: 14.4 %
NEUTROPHILS # BLD AUTO: 2.02 K/UL
NEUTROPHILS NFR BLD AUTO: 43.7 %
PLATELET # BLD AUTO: 332 K/UL
RBC # BLD: 5.45 M/UL
RBC # FLD: 13.1 %
WBC # FLD AUTO: 4.64 K/UL

## 2020-12-04 ENCOUNTER — APPOINTMENT (OUTPATIENT)
Dept: RHEUMATOLOGY | Facility: CLINIC | Age: 40
End: 2020-12-04
Payer: COMMERCIAL

## 2020-12-04 VITALS
HEART RATE: 92 BPM | DIASTOLIC BLOOD PRESSURE: 76 MMHG | SYSTOLIC BLOOD PRESSURE: 128 MMHG | BODY MASS INDEX: 28.89 KG/M2 | OXYGEN SATURATION: 98 % | WEIGHT: 190 LBS | TEMPERATURE: 97.7 F

## 2020-12-04 PROCEDURE — 99214 OFFICE O/P EST MOD 30 MIN: CPT

## 2020-12-04 PROCEDURE — 99072 ADDL SUPL MATRL&STAF TM PHE: CPT

## 2020-12-04 NOTE — REVIEW OF SYSTEMS
[As Noted in HPI] : as noted in HPI [Joint Pain] : joint pain [Joint Swelling] : joint swelling [Joint Stiffness] : joint stiffness [Negative] : Heme/Lymph [As noted in HPI] : as noted in HPI [Heart Rate Is Fast] : fast heart rate [FreeTextEntry3] : no hx inflammatory eye dz  [FreeTextEntry7] : no sx of inflammatory bowel dz [FreeTextEntry8] : denies previous STD  [FreeTextEntry9] : current sudden pauciarticular  [de-identified] : ? nail abn and possible rash on R shin months ago- nothing now

## 2020-12-04 NOTE — ASSESSMENT
[FreeTextEntry1] : 41 yo .. w/ new onset migratory pauciarticular (monoarthritis) asymmetrical inflammatory arthritis with w/u + RF 40, CCP > 250   \par \par 1) Inflammatory arthropathy: + RF 40, CCP > 250, neg HLAB27, sudden onset monoarticular pattern new onset 4/20... 1st episode R 1st MTP but also L shoulder (SAB) and R lateral hip- R elbow, L1 MTP- several PIP joints despite 0.6 mg colchicine daily BID and recently added SSA 2000 mg daily..always responsive to 40 mg pred x 1-3 days.   \par SSA initially started w/ inadequate response at 2 gm. and now not well tolerated. Started Humira and now qow and doing very well. Rash/ joints fully controlled. LFTs nl.. will stop SSA now and if needed may be able to use low dose MTX.. \par  ? psoriasis + FH  (in past - likely eczema)  vs Reiters (hx epidymitis though denied STD)... and many ys ago\par Gout UA 8.8 with + metabolic syndrome possible, ordered [employer] CTscan to r/o crystalline arthropathy L foot- though very early in disease process may not see anything. \par Nail dystrophy possible pitting  No tophi  - unchanged \par NOTE: \par FH significant for both Gout and PsA..\par No personal of psoriasis, AS, inflammatory back, bowel or eye dz\par Denies renal calculi\par Baseline labs 6/20 UA 7.0 nl CMP.  updated UA 8.8. \par \par DIscussion:  \par unclear if this is gout or PsA sin psoriasis or RA.  \par Will monitor over time.  Started SSA 2 gm but after 2 months- will not continue \par - caution with MTX given NAFLD with elevated ALT at baseline .. nl now \par May need to consider tx for gout in future but will wait for clarity.. CTScan should help.. not necessary at this point with no recent swelling.. repeat UA needed will follow closely \par Wanted to start with ORencia- but not approved, now on Humira.. no issues w/ LFTs thus far.. ok to continue..  given NAFLd.. \par \par 2)  metabolic syndrome:   needs to review with PCP \par - HTN: well controlled on treatment \par - overweight BMI 28\par - NAFLD:  persistently elevated ALT 45-50 \par - HLD: , , HD 45,  w/ ratio 5.3 \par - HbA1c 5.9  \par \par \par Plan: \par - stop SSA now \par \par - labs prior to next visit \par \par - imaging SI joints and CTscan of L foot to rule out crystals- [employer] CTscan\par \par - literature provided for biologic tx, given issues w/ NAFLD, elevated LFTs will start with Orencia.. discussed infusion v. self injection, will do self injection. Needs hep b/c and QFT now..  \par Come in to office when available for demonstration self injection. \par \par - rto 2m\par \par - sign up for Follow My Health..

## 2020-12-04 NOTE — PHYSICAL EXAM
[General Appearance - Alert] : alert [General Appearance - In No Acute Distress] : in no acute distress [General Appearance - Well Nourished] : well nourished [General Appearance - Well Developed] : well developed [General Appearance - Well-Appearing] : healthy appearing [Sclera] : the sclera and conjunctiva were normal [PERRL With Normal Accommodation] : pupils were equal in size, round, and reactive to light [Extraocular Movements] : extraocular movements were intact [Auscultation Breath Sounds / Voice Sounds] : lungs were clear to auscultation bilaterally [Heart Rate And Rhythm] : heart rate was normal and rhythm regular [Heart Sounds] : normal S1 and S2 [Heart Sounds Gallop] : no gallops [Murmurs] : no murmurs [Heart Sounds Pericardial Friction Rub] : no pericardial rub [Edema] : there was no peripheral edema [No CVA Tenderness] : no ~M costovertebral angle tenderness [No Spinal Tenderness] : no spinal tenderness [Abnormal Walk] : normal gait [Nail Clubbing] : no clubbing  or cyanosis of the fingernails [Musculoskeletal - Swelling] : no joint swelling seen [Motor Tone] : muscle strength and tone were normal [Skin Color & Pigmentation] : normal skin color and pigmentation [Skin Turgor] : normal skin turgor [] : no rash [Deep Tendon Reflexes (DTR)] : deep tendon reflexes were 2+ and symmetric [Sensation] : the sensory exam was normal to light touch and pinprick [No Focal Deficits] : no focal deficits [Oriented To Time, Place, And Person] : oriented to person, place, and time [Impaired Insight] : insight and judgment were intact [Affect] : the affect was normal [Cervical Lymph Nodes Enlarged Posterior Bilaterally] : posterior cervical [Cervical Lymph Nodes Enlarged Anterior Bilaterally] : anterior cervical [Supraclavicular Lymph Nodes Enlarged Bilaterally] : supraclavicular [FreeTextEntry1] : 5/5 str

## 2020-12-04 NOTE — HISTORY OF PRESENT ILLNESS
[FreeTextEntry1] : 12/4/20\par - overall joints well controlled.. ? concerned about SSA, now experiencing palpitations and anxiety with night time dose.  Would like to stop. \par - energy level improved, overwhelming fatigue improved.. no rashes, fevers.. \par - neck pain:  L side and occas into L hand 3-5th w/ paresthesias/ pain radiating intermittently but .. no weakness.. in PT w/ some improvement... but incomplete.. doesn't want to take anything else. Feels better at  PT w/ massage \par - no joint swelling since last visit.  \par - labs 11/28/20 all ok  \par - Nail pitting noted and now remembers rash on shin- dry scaly but doesn't have image, was seen by Derm dx eczema topical steroids + response \par \par 1) Pauciarthropathy:  \par 2) Metabolic syndrome:  elevated Lipids, HbA1c and UA 8.8\par ___________________________________________________________________________-\par \par \par (Initial HPI 6/23/20) \par 40 yo .. w/ new onset severe pain and swelling in R 1st MTP recurrently in past month.  \par Immediate and nearly full resolution with steroids.  Started on Colchicine once daily, but pain/ swelling returned, another course of steroids needed.  Initial episode 1 m ago... \par Hx in past 2 months sudden onset L shoulder pain/ stiffness spontaneous onset and resolution within 1 wks- MRI + subacromial bursitis.. \par Similar sudden onset R lateral hip pain severe for several days\par ROS otherwise denies inflammatory eye, back, bowel or rash. Denies renal calculi, tophi \par nail deformities.  No constitutional sx and Wt stable for past 15 lbs\par .  \par FH significant for:  \par - gout + FH father and MGM.. \par - Psoriasis:  brother, 1st cousin \par - T2DM both parents \par \par PMH:  \par HTN mild, well controlled \par On HCTZ/ lisinopril for HTN well controlled \par

## 2020-12-14 ENCOUNTER — NON-APPOINTMENT (OUTPATIENT)
Age: 40
End: 2020-12-14

## 2021-01-15 ENCOUNTER — APPOINTMENT (OUTPATIENT)
Dept: RHEUMATOLOGY | Facility: CLINIC | Age: 41
End: 2021-01-15
Payer: COMMERCIAL

## 2021-01-15 VITALS
OXYGEN SATURATION: 97 % | DIASTOLIC BLOOD PRESSURE: 60 MMHG | HEART RATE: 78 BPM | TEMPERATURE: 96.8 F | SYSTOLIC BLOOD PRESSURE: 110 MMHG | BODY MASS INDEX: 28.79 KG/M2 | WEIGHT: 190 LBS | HEIGHT: 68 IN

## 2021-01-15 PROCEDURE — 99072 ADDL SUPL MATRL&STAF TM PHE: CPT

## 2021-01-15 PROCEDURE — 99214 OFFICE O/P EST MOD 30 MIN: CPT

## 2021-01-15 RX ORDER — PREDNISONE 20 MG/1
20 TABLET ORAL
Qty: 120 | Refills: 3 | Status: DISCONTINUED | COMMUNITY
Start: 2020-06-23 | End: 2021-01-15

## 2021-01-15 NOTE — REVIEW OF SYSTEMS
[As noted in HPI] : as noted in HPI [Heart Rate Is Fast] : fast heart rate [As Noted in HPI] : as noted in HPI [Joint Pain] : joint pain [Joint Swelling] : joint swelling [Joint Stiffness] : joint stiffness [Negative] : Heme/Lymph [FreeTextEntry3] : no hx inflammatory eye dz  [FreeTextEntry7] : no sx of inflammatory bowel dz [FreeTextEntry8] : denies previous STD  [FreeTextEntry9] : current sudden pauciarticular - nothing since starting Humira [de-identified] : ? nail abn and possible rash on R shin months ago- nothing now

## 2021-01-15 NOTE — PHYSICAL EXAM
[General Appearance - Alert] : alert [General Appearance - In No Acute Distress] : in no acute distress [General Appearance - Well Nourished] : well nourished [General Appearance - Well Developed] : well developed [General Appearance - Well-Appearing] : healthy appearing [Sclera] : the sclera and conjunctiva were normal [PERRL With Normal Accommodation] : pupils were equal in size, round, and reactive to light [Extraocular Movements] : extraocular movements were intact [Auscultation Breath Sounds / Voice Sounds] : lungs were clear to auscultation bilaterally [Heart Rate And Rhythm] : heart rate was normal and rhythm regular [Heart Sounds] : normal S1 and S2 [Heart Sounds Gallop] : no gallops [Heart Sounds Pericardial Friction Rub] : no pericardial rub [Murmurs] : no murmurs [Edema] : there was no peripheral edema [Cervical Lymph Nodes Enlarged Posterior Bilaterally] : posterior cervical [Cervical Lymph Nodes Enlarged Anterior Bilaterally] : anterior cervical [Supraclavicular Lymph Nodes Enlarged Bilaterally] : supraclavicular [No CVA Tenderness] : no ~M costovertebral angle tenderness [No Spinal Tenderness] : no spinal tenderness [Abnormal Walk] : normal gait [Musculoskeletal - Swelling] : no joint swelling seen [Nail Clubbing] : no clubbing  or cyanosis of the fingernails [Motor Tone] : muscle strength and tone were normal [Skin Color & Pigmentation] : normal skin color and pigmentation [Skin Turgor] : normal skin turgor [] : no rash [Deep Tendon Reflexes (DTR)] : deep tendon reflexes were 2+ and symmetric [Sensation] : the sensory exam was normal to light touch and pinprick [No Focal Deficits] : no focal deficits [Impaired Insight] : insight and judgment were intact [Oriented To Time, Place, And Person] : oriented to person, place, and time [Affect] : the affect was normal [FreeTextEntry1] : 5/5 str

## 2021-01-15 NOTE — HISTORY OF PRESENT ILLNESS
[FreeTextEntry1] : 1/15/21\par - Labs 1/13/21 nl ESR, CBC, AST/ ALT\par HbA1c 5.8, Uric acid 8.5, , , HD 47, \par - no active synovitis, though L shoulder continues to have limited ROM- injury at work, in PT 3 x/ wk no pain.. no worse or better over time\par - neck issues persists 2/2 injury.. working w/ PM/ neurosx.. appt next wk, associated w/ radicular pain radiating from neck to L hand into fingers 3-4th.. str mnt- denies weakness\par - tolerating Humira QOW w/out issues, no SE/ Tox.. \par - 1st Covid vaccine done, tolerated w/out issues.  \par - Nail pitting noted and now remembers rash on shin- dry scaly but doesn't have image, was seen by Derm dx eczema topical steroids + response - no return\par \par 1) Pauciarthropathy:  \par 2) Metabolic syndrome:  elevated Lipids, HbA1c and UA 8.8\par ___________________________________________________________________________-\par \par \par (Initial HPI 6/23/20) \par 40 yo .. w/ new onset severe pain and swelling in R 1st MTP recurrently in past month.  \par Immediate and nearly full resolution with steroids.  Started on Colchicine once daily, but pain/ swelling returned, another course of steroids needed.  Initial episode 1 m ago... \par Hx in past 2 months sudden onset L shoulder pain/ stiffness spontaneous onset and resolution within 1 wks- MRI + subacromial bursitis.. \par Similar sudden onset R lateral hip pain severe for several days\par ROS otherwise denies inflammatory eye, back, bowel or rash. Denies renal calculi, tophi \par nail deformities.  No constitutional sx and Wt stable for past 15 lbs\par .  \par FH significant for:  \par - gout + FH father and MGM.. \par - Psoriasis:  brother, 1st cousin \par - T2DM both parents \par \par PMH:  \par HTN mild, well controlled \par On HCTZ/ lisinopril for HTN well controlled \par

## 2021-01-15 NOTE — ASSESSMENT
[FreeTextEntry1] : 39 yo .. 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  \par Started Humira 10/20 with no inflammatory arthropathy since.. stopped sSA 12/20 without issues.. only residual joint problem L shoulder and that is r/t trauma to shoulder/ neck at work. \par  ? psoriasis + FH  (in past - likely eczema)  vs Reiters (hx epidymitis though denied STD)... and many ys ago\par Gout UA 8.8 with + metabolic syndrome possible, ordered Dual energy CTscan to r/o crystalline arthropathy L foot- though very early in disease process may not see anything and hasn't been done. \par Nail dystrophy possible pitting  No tophi  - unchanged \par NOTE :\par Initial presentation sudden onset monoarticular pattern new onset 4/20... 1st episode R 1st MTP but also L shoulder (SAB) and R lateral hip- R elbow, L1 MTP- several PIP joints despite 0.6 mg colchicine daily BID and recently added SSA 2000 mg daily..always responsive to 40 mg pred x 1-3 days.   \par - SSA in past initially tolerated then developed Nausea..\par - FH significant for both Gout and PsA..\par - No personal of psoriasis, AS, inflammatory back, bowel or eye dz\par Denies renal calculi\par Baseline labs 6/20 UA 7.0 nl CMP.  updated UA 8.8. \par \par DIscussion:  \par unclear if this is gout or PsA sin psoriasis or RA.  \par - caution with MTX given NAFLD with elevated ALT at baseline .. nl now \par May need to consider tx for gout in future but will wait for clarity.. CTScan should help.. not necessary at this point with no recent swelling.. repeat UA needed will follow closely \par Wanted to start with ORencia- but not approved, now on Humira.. no issues w/ LFTs thus far.. ok to continue..  given NAFLd.. \par \par 2)  metabolic syndrome:   needs to review with PCP, still concerned about persistently elevated LDL and UA along w/ mildly elevated HbA1c \par - HTN: well controlled on treatment \par - overweight BMI 28\par - NAFLD:  persistently elevated ALT 45-50 -> LFTs fine 1/21 \par - HLD: , , HD 45,  w/ ratio 5.3 \par - HbA1c 5.9  -> 5.8\par \par \par Plan: \par - labs prior to next visit \par \par - imaging SI joints and CTscan of L foot to rule out crystals- Dual Energy CTscan (if sx difficult to control, hold off for now). \par \par - may still use steroids PRN for acute inflammation but call to let us know \par \par - rto 3m\par

## 2021-01-17 LAB
ALT SERPL-CCNC: 40 U/L
AST SERPL-CCNC: 23 U/L
BASOPHILS # BLD AUTO: 0.04 K/UL
BASOPHILS NFR BLD AUTO: 0.8 %
CHOLEST SERPL-MCNC: 238 MG/DL
EOSINOPHIL # BLD AUTO: 0.14 K/UL
EOSINOPHIL NFR BLD AUTO: 2.9 %
ERYTHROCYTE [SEDIMENTATION RATE] IN BLOOD BY WESTERGREN METHOD: 18 MM/HR
ESTIMATED AVERAGE GLUCOSE: 120 MG/DL
HBA1C MFR BLD HPLC: 5.8 %
HCT VFR BLD CALC: 43.7 %
HDLC SERPL-MCNC: 47 MG/DL
HGB BLD-MCNC: 14 G/DL
IMM GRANULOCYTES NFR BLD AUTO: 0.4 %
LDLC SERPL CALC-MCNC: 164 MG/DL
LYMPHOCYTES # BLD AUTO: 2.05 K/UL
LYMPHOCYTES NFR BLD AUTO: 42.8 %
MAN DIFF?: NORMAL
MCHC RBC-ENTMCNC: 26.9 PG
MCHC RBC-ENTMCNC: 32 GM/DL
MCV RBC AUTO: 84 FL
MONOCYTES # BLD AUTO: 0.62 K/UL
MONOCYTES NFR BLD AUTO: 12.9 %
NEUTROPHILS # BLD AUTO: 1.92 K/UL
NEUTROPHILS NFR BLD AUTO: 40.2 %
NONHDLC SERPL-MCNC: 191 MG/DL
PLATELET # BLD AUTO: 337 K/UL
RBC # BLD: 5.2 M/UL
RBC # FLD: 13.2 %
TRIGL SERPL-MCNC: 136 MG/DL
URATE SERPL-MCNC: 8.5 MG/DL
WBC # FLD AUTO: 4.79 K/UL

## 2021-01-19 ENCOUNTER — APPOINTMENT (OUTPATIENT)
Dept: RHEUMATOLOGY | Facility: CLINIC | Age: 41
End: 2021-01-19
Payer: COMMERCIAL

## 2021-01-19 VITALS
OXYGEN SATURATION: 97 % | SYSTOLIC BLOOD PRESSURE: 110 MMHG | WEIGHT: 185 LBS | BODY MASS INDEX: 28.04 KG/M2 | HEIGHT: 68 IN | DIASTOLIC BLOOD PRESSURE: 80 MMHG | TEMPERATURE: 97.1 F | HEART RATE: 81 BPM

## 2021-01-19 DIAGNOSIS — M65.4 RADIAL STYLOID TENOSYNOVITIS [DE QUERVAIN]: ICD-10-CM

## 2021-01-19 PROCEDURE — 99072 ADDL SUPL MATRL&STAF TM PHE: CPT

## 2021-01-19 PROCEDURE — 99213 OFFICE O/P EST LOW 20 MIN: CPT | Mod: 25

## 2021-01-19 PROCEDURE — 96372 THER/PROPH/DIAG INJ SC/IM: CPT

## 2021-01-19 RX ORDER — METHYLPRED ACET/NACL,ISO-OS/PF 80 MG/ML
80 VIAL (ML) INJECTION
Qty: 1 | Refills: 0 | Status: COMPLETED | OUTPATIENT
Start: 2021-01-19

## 2021-01-19 RX ADMIN — METHYLPREDNISOLONE ACETATE 0 MG/ML: 80 INJECTION, SUSPENSION INTRA-ARTICULAR; INTRALESIONAL; INTRAMUSCULAR; SOFT TISSUE at 00:00

## 2021-01-19 NOTE — HISTORY OF PRESENT ILLNESS
[FreeTextEntry1] : 1/19/21 \par - R thumb pain and swelling and pain w/ movement.. took 20 mg pred and resolved, then R shoulder pain and limited ROM for past few days.. sudden onset both.. \par - continues QOW Humira 40 mg x 4 m now.. no other DMARd.. \par - Neck issues better.. but still present\par - Labs 1/13/21 nl ESR, CBC, AST/ ALT\par HbA1c 5.8, Uric acid 8.5, , , HD 47, \par - neck issues persists 2/2 injury.. working w/ PM/ neurosx.. appt next wk, associated w/ radicular pain radiating from neck to L hand into fingers 3-4th.. str mnt- denies weakness.. \par - tolerating Humira QOW w/out issues, no SE/ Tox.. \par - 1st Covid vaccine done, tolerated w/out issues.  \par - Nail pitting noted and now remembers rash on shin- dry scaly but doesn't have image, was seen by Derm dx eczema topical steroids + response - no return\par \par 1) Pauciarthropathy:  \par 2) Metabolic syndrome:  elevated Lipids, HbA1c and UA 8.8\par ___________________________________________________________________________-\par \par \par (Initial HPI 6/23/20) \par 40 yo .. w/ new onset severe pain and swelling in R 1st MTP recurrently in past month.  \par Immediate and nearly full resolution with steroids.  Started on Colchicine once daily, but pain/ swelling returned, another course of steroids needed.  Initial episode 1 m ago... \par Hx in past 2 months sudden onset L shoulder pain/ stiffness spontaneous onset and resolution within 1 wks- MRI + subacromial bursitis.. \par Similar sudden onset R lateral hip pain severe for several days\par ROS otherwise denies inflammatory eye, back, bowel or rash. Denies renal calculi, tophi \par nail deformities.  No constitutional sx and Wt stable for past 15 lbs\par .  \par FH significant for:  \par - gout + FH father and MGM.. \par - Psoriasis:  brother, 1st cousin \par - T2DM both parents \par \par PMH:  \par HTN mild, well controlled \par On HCTZ/ lisinopril for HTN well controlled \par

## 2021-01-19 NOTE — PHYSICAL EXAM
[General Appearance - Alert] : alert [General Appearance - In No Acute Distress] : in no acute distress [General Appearance - Well Nourished] : well nourished [General Appearance - Well Developed] : well developed [General Appearance - Well-Appearing] : healthy appearing [Sclera] : the sclera and conjunctiva were normal [PERRL With Normal Accommodation] : pupils were equal in size, round, and reactive to light [Extraocular Movements] : extraocular movements were intact [Auscultation Breath Sounds / Voice Sounds] : lungs were clear to auscultation bilaterally [Heart Rate And Rhythm] : heart rate was normal and rhythm regular [Heart Sounds] : normal S1 and S2 [Heart Sounds Gallop] : no gallops [Murmurs] : no murmurs [Heart Sounds Pericardial Friction Rub] : no pericardial rub [Edema] : there was no peripheral edema [Cervical Lymph Nodes Enlarged Posterior Bilaterally] : posterior cervical [Cervical Lymph Nodes Enlarged Anterior Bilaterally] : anterior cervical [Supraclavicular Lymph Nodes Enlarged Bilaterally] : supraclavicular [No CVA Tenderness] : no ~M costovertebral angle tenderness [No Spinal Tenderness] : no spinal tenderness [Abnormal Walk] : normal gait [Nail Clubbing] : no clubbing  or cyanosis of the fingernails [Musculoskeletal - Swelling] : no joint swelling seen [Motor Tone] : muscle strength and tone were normal [Skin Color & Pigmentation] : normal skin color and pigmentation [Skin Turgor] : normal skin turgor [] : no rash [Deep Tendon Reflexes (DTR)] : deep tendon reflexes were 2+ and symmetric [Sensation] : the sensory exam was normal to light touch and pinprick [No Focal Deficits] : no focal deficits [Oriented To Time, Place, And Person] : oriented to person, place, and time [Impaired Insight] : insight and judgment were intact [Affect] : the affect was normal [FreeTextEntry1] : 5/5 str

## 2021-01-19 NOTE — REVIEW OF SYSTEMS
[As noted in HPI] : as noted in HPI [Heart Rate Is Fast] : fast heart rate [As Noted in HPI] : as noted in HPI [Joint Pain] : joint pain [Joint Swelling] : joint swelling [Joint Stiffness] : joint stiffness [Negative] : Heme/Lymph [FreeTextEntry3] : no hx inflammatory eye dz  [FreeTextEntry7] : no sx of inflammatory bowel dz [FreeTextEntry8] : denies previous STD  [FreeTextEntry9] : current sudden pauciarticular - nothing since starting Humira [de-identified] : ? nail abn and possible rash on R shin months ago- nothing now

## 2021-01-19 NOTE — ASSESSMENT
[FreeTextEntry1] : 39 yo .. 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  \par Started Humira 10/20 with no inflammatory arthropathy since.. stopped sSA 12/20 without issues.. only residual joint problem L shoulder and that is r/t trauma to shoulder/ neck at work... till past few days, presents today with likely Dequervain's and RTC tendinopathy R shoulder.  \par Now on Humira nearly 4 m, would like to consider wkly dosing but not covered. Will try lower dose MTX 10 mg wkly as adjuvant tx.. should be ok, minimal ETOH and LFTs recently nl. Will follow labs mnthly ... \par Reviewed R/ B/SE and understands, will call if any issues or concerns.\par  ? psoriasis + FH  (in past - likely eczema)  vs Reiters (hx epidymitis though denied STD)... and many ys ago\par Gout UA 8.8 with + metabolic syndrome possible, ordered Dual energy CTscan to r/o crystalline arthropathy L foot- though very early in disease process may not see anything and hasn't been done. \par Nail dystrophy possible pitting  No tophi  - unchanged \par NOTE :\par Initial presentation sudden onset monoarticular pattern new onset 4/20... 1st episode R 1st MTP but also L shoulder (SAB) and R lateral hip- R elbow, L1 MTP- several PIP joints despite 0.6 mg colchicine daily BID and recently added SSA 2000 mg daily..always responsive to 40 mg pred x 1-3 days.   \par - SSA in past initially tolerated then developed Nausea..\par - FH significant for both Gout and PsA..\par - No personal of psoriasis, AS, inflammatory back, bowel or eye dz\par Denies renal calculi\par Baseline labs 6/20 UA 7.0 nl CMP.  updated UA 8.8. \par \par DIscussion:  \par unclear if this is gout or PsA sin psoriasis or RA.  \par - caution with MTX given NAFLD with elevated ALT at baseline .. nl now \par May need to consider tx for gout in future but will wait for clarity.. CTScan should help.. not necessary at this point with no recent swelling.. repeat UA needed will follow closely \par Wanted to start with OR[facility]- but not approved, now on Humira.. no issues w/ LFTs thus far.. ok to continue..  given NAFLd.. \par \par 2)  metabolic syndrome:   needs to review with PCP, still concerned about persistently elevated LDL and UA along w/ mildly elevated HbA1c \par - HTN: well controlled on treatment \par - overweight BMI 28\par - NAFLD:  persistently elevated ALT 45-50 -> LFTs fine 1/21 \par - HLD: , , HD 45,  w/ ratio 5.3 \par - HbA1c 5.9  -> 5.8\par \par \par Plan: \par - Start MTX (methotrexate) at 4 tabs (10 mg)  po wk taken all together at the same time.  \par Most people tolerate best at night before bed and in middle of wk\par Do labs in 2 wks, if ok will increase MTX to 6 tabs (15 mg) \par Will then need to do labs every month for the first 6 months.  \par - let us know if you have any problems (nausea, hair loss, mouth sores).  Ideally these will be minimized by....also taking \par \par - Folic acid 1-3 mg daily on all the days you don't take MTX.  Start at 1 mg and increase in having any SE. \par \par - given IM depo today, ok to get vaccine in 2 wk.. should hold MTX x 2 wk but will only have just started, probably not necessary \par \par - imaging SI joints and CTscan of L foot to rule out crystals- Dual Energy CTscan (if sx difficult to control, hold off for now). \par \par - may still use steroids PRN for acute inflammation but call to let us know \par \par - rto 3m\par

## 2021-01-19 NOTE — PROCEDURE
[Today's Date:] : Date: [unfilled] [Risks] : risks [Benefits] : benefits [Alternatives] : alternatives [Consent Obtained] : written consent was obtained prior to the procedure and is detailed in the patient's record [Patient] : Prior to the start of the procedure a time out was taken and the identity of the patient was confirmed via name and date of birth with the patient. The correct site and the procedure to be performed were confirmed. The correct side was confirmed if applicable. The availability of the correct equipment was verified [Therapeutic] : therapeutic [#1 Site: ______] : #1 site identified in the [unfilled] [Alcohol] : alcohol [25 gauge 5/8  inch] : A 25 gauge 5/8 inch needle was used [Depomedrol ___ mg] : Depomedrol [unfilled] mg [Tolerated Well] : the patient tolerated the procedure well [No Complications] : there were no complications [Instructions Given] : handouts/patient instructions were given to patient [Patient Instructed to Call] : patient was instructed to call if redness at site, a decrease in range of motion or an increase in pain is noted after procedure. [FreeTextEntry1] : .continue prn use oral pred.. and start MTX

## 2021-02-01 ENCOUNTER — NON-APPOINTMENT (OUTPATIENT)
Age: 41
End: 2021-02-01

## 2021-02-14 ENCOUNTER — RX RENEWAL (OUTPATIENT)
Age: 41
End: 2021-02-14

## 2021-02-26 ENCOUNTER — OUTPATIENT (OUTPATIENT)
Dept: OUTPATIENT SERVICES | Facility: HOSPITAL | Age: 41
LOS: 1 days | End: 2021-02-26
Payer: COMMERCIAL

## 2021-02-26 ENCOUNTER — APPOINTMENT (OUTPATIENT)
Dept: ANESTHESIOLOGY | Facility: CLINIC | Age: 41
End: 2021-02-26

## 2021-02-26 DIAGNOSIS — M54.12 RADICULOPATHY, CERVICAL REGION: ICD-10-CM

## 2021-02-26 PROCEDURE — 62321 NJX INTERLAMINAR CRV/THRC: CPT

## 2021-03-22 LAB
ALT SERPL-CCNC: 31 U/L
AST SERPL-CCNC: 16 U/L
BASOPHILS # BLD AUTO: 0.05 K/UL
BASOPHILS NFR BLD AUTO: 0.7 %
EOSINOPHIL # BLD AUTO: 0.12 K/UL
EOSINOPHIL NFR BLD AUTO: 1.8 %
HCT VFR BLD CALC: 40.4 %
HGB BLD-MCNC: 12.9 G/DL
IMM GRANULOCYTES NFR BLD AUTO: 0.6 %
LYMPHOCYTES # BLD AUTO: 2.49 K/UL
LYMPHOCYTES NFR BLD AUTO: 36.6 %
MAN DIFF?: NORMAL
MCHC RBC-ENTMCNC: 27.8 PG
MCHC RBC-ENTMCNC: 31.9 GM/DL
MCV RBC AUTO: 87.1 FL
MONOCYTES # BLD AUTO: 0.76 K/UL
MONOCYTES NFR BLD AUTO: 11.2 %
NEUTROPHILS # BLD AUTO: 3.34 K/UL
NEUTROPHILS NFR BLD AUTO: 49.1 %
PLATELET # BLD AUTO: 343 K/UL
RBC # BLD: 4.64 M/UL
RBC # FLD: 14.1 %
WBC # FLD AUTO: 6.8 K/UL

## 2021-03-23 ENCOUNTER — TRANSCRIPTION ENCOUNTER (OUTPATIENT)
Age: 41
End: 2021-03-23

## 2021-04-28 ENCOUNTER — APPOINTMENT (OUTPATIENT)
Dept: RHEUMATOLOGY | Facility: CLINIC | Age: 41
End: 2021-04-28
Payer: COMMERCIAL

## 2021-04-28 ENCOUNTER — NON-APPOINTMENT (OUTPATIENT)
Age: 41
End: 2021-04-28

## 2021-04-28 VITALS
WEIGHT: 190 LBS | HEART RATE: 116 BPM | SYSTOLIC BLOOD PRESSURE: 140 MMHG | BODY MASS INDEX: 28.79 KG/M2 | OXYGEN SATURATION: 96 % | HEIGHT: 68 IN | DIASTOLIC BLOOD PRESSURE: 70 MMHG | TEMPERATURE: 98 F

## 2021-04-28 PROCEDURE — 99072 ADDL SUPL MATRL&STAF TM PHE: CPT

## 2021-04-28 PROCEDURE — 20605 DRAIN/INJ JOINT/BURSA W/O US: CPT | Mod: RT

## 2021-04-28 PROCEDURE — 99214 OFFICE O/P EST MOD 30 MIN: CPT | Mod: 25

## 2021-04-28 RX ORDER — METHYLPRED ACET/NACL,ISO-OS/PF 80 MG/ML
80 VIAL (ML) INJECTION
Qty: 1 | Refills: 0 | Status: COMPLETED | OUTPATIENT
Start: 2021-04-28

## 2021-04-28 RX ADMIN — METHYLPREDNISOLONE ACETATE 0 MG/ML: 80 INJECTION, SUSPENSION INTRA-ARTICULAR; INTRALESIONAL; INTRAMUSCULAR; SOFT TISSUE at 00:00

## 2021-04-28 NOTE — PROCEDURE
[Today's Date:] : Date: [unfilled] [Risks] : risks [Benefits] : benefits [Alternatives] : alternatives [Consent Obtained] : written consent was obtained prior to the procedure and is detailed in the patient's record [Patient] : Prior to the start of the procedure a time out was taken and the identity of the patient was confirmed via name and date of birth with the patient. The correct site and the procedure to be performed were confirmed. The correct side was confirmed if applicable. The availability of the correct equipment was verified [Therapeutic] : therapeutic [#1 Site: ______] : #1 site identified in the [unfilled] [Ethyl Chloride] : ethyl chloride [Betadine] : betadine solution [Alcohol] : alcohol [Depomedrol ___ mg] : Depomedrol [unfilled] mg [Tolerated Well] : the patient tolerated the procedure well [No Complications] : there were no complications [Instructions Given] : handouts/patient instructions were given to patient [Patient Instructed to Call] : patient was instructed to call if redness at site, a decrease in range of motion or an increase in pain is noted after procedure. [de-identified] : 27 g 5/8"  [FreeTextEntry1] : ice routinely 20/20 for next 24 hs and call if pain not improved w/ in 72 or  if worsened joint pain, or signs of infection including fevers, chills, redness at site ensues.\par and wear wrist splint.. call if not improved next wk \par \par

## 2021-04-28 NOTE — ASSESSMENT
[FreeTextEntry1] : 39 yo .. 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  \par Started Humira 10/20 with no inflammatory arthropathy since.. stopped sSA 12/20 without issues.. only residual joint problem L shoulder and that is r/t trauma to shoulder/ neck at work...then 1-2/21 experienced return of asymmetrical synovitis and started on MTX 1/21.. after 6 wk all synovitis resolved till today.. \par Updated labs suggest nl LFTs on 10 mg MTX.. \par Concerned about return of synovitis.. will increase MTX to 15 mg.. but will need close monitoring of LFTs.  \par Given 80 mg depo IACS to R wrist.. and advised to take Humira wkly for 3 wks then resume qow.  \par Additionally changed MTX to SQ injection for better MOA.. \par NOTE :\par Initial presentation sudden onset monoarticular pattern new onset 4/20... 1st episode R 1st MTP but also L shoulder (SAB) and R lateral hip- R elbow, L1 MTP- several PIP joints despite 0.6 mg colchicine daily BID and -added SSA 2000 mg daily..always responsive to 40 mg pred x 1-3 days. \par Gout UA 8.8 with + metabolic syndrome possible, \par Nail dystrophy possible pitting  No tophi  - unchanged   \par - SSA in past initially tolerated then developed Nausea..\par - FH significant for both Gout and PsA..\par - No personal of psoriasis, AS, inflammatory back, bowel or eye dz\par Denies renal calculi\par Baseline labs 6/20 UA 7.0 nl CMP.  updated UA 8.8. \par \par DIscussion:  \par unclear if this is gout or PsA sin psoriasis or RA (presentation SpA but labs support seropositive RA)\par - caution with MTX given NAFLD with elevated ALT at baseline .. nl now ... will need to continue to monitor closely now on higher dose given NAFLD \par May need to consider tx for gout in future but will wait for clarity..  Dual energy CTscan to r/o crystalline arthropathy L foot/ or R wrist could be considered.. or if effusion would send for synovial fluid... hold for now.  Repeat UA level with next labs.  \par Wanted to start with ORencia- but not approved, now on Humira.. no issues w/ LFTs thus far.. ok to continue..  \par \par 2)  metabolic syndrome:   needs to review with PCP, still concerned about persistently elevated LDL and UA along w/ mildly elevated HbA1c \par - HTN: well controlled on treatment \par - overweight BMI 28\par - NAFLD:  persistently elevated ALT 45-50 -> LFTs fine 1/21 -> 3/21 nl \par - HLD: , , HD 45,  w/ ratio 5.3 \par - HbA1c 5.9  -> 5.8\par \par 3) Cervical radiculopathy:  severe HNP at C 4-5 - 5-6 with LUE paresthesias/ weakness, sx indicated.  in process of evaluating optimal tx options. \par Will need to hold Humira for sx.. but ideally continue MTX throughout.. that's partially why increased dose to 15 mg\par Need to see imaging.. and coordinate w/ surgical team \par \par \par Plan: \par - Increase MTX (methotrexate) 15 mg by SQ injection. labs in 2 wk and repeat UA at that time.  \par \par - Folic acid 1-3 mg daily on all the days you don't take MTX.  Start at 1 mg and increase in having any SE. \par \par - IM depo today 80 mg IACS R wrist.. \par \par - imaging SI joints (still needed) and [employer] and CTscan or L foot / or R wrist if swelling returns or persists.. to date episodes resolve within few days..\par \par - for any effusion, need synovial fluid analysis \par \par - may still use steroids PRN for acute inflammation but call to let us know \par \par - let me know how injection does, and when sx indicated so we can coordinate. \par \par - rto 2m\par

## 2021-04-28 NOTE — HISTORY OF PRESENT ILLNESS
[FreeTextEntry1] : 4/28/21\par - 10 mg wkly MTX  tabs well tolerated and after 6 wk noted considerable improvement.  Jan/ Feb experienced migratory large/ small joint asymmetrical pattern, episodes lasting days.. then resolving spontaneously. Until today no need for steroids .. did take 20 mg this am.. \par - continues every other wk Humira and folic aid- all meds tolerated w/out issues \par - R wrist severe pain/ and swelling progressively worse over past few hours \par - L arm pain from neck through entire hand w/ slight weakness.. worse over 3-4 th digits of hands.  Seen by  neurosx.. recommending discectomy and reporting that all DMARds would have to be held x 2 + mnths.  HNP 2/2 blunt injury  \par - labs after starting MTX 3/21 w/ nl AST/ ALT.. \par - completed COVID vaccination w/out issues  \par - Nail pitting noted and now remembers rash on shin- dry scaly but doesn't have image, was seen by Derm dx eczema topical steroids + response - no return\par \par 1) Pauciarthropathy:  \par 2) Metabolic syndrome:  elevated Lipids, HbA1c and UA 8.8\par ___________________________________________________________________________-\par \par \par (Initial HPI 6/23/20) \par 40 yo .. w/ new onset severe pain and swelling in R 1st MTP recurrently in past month.  \par Immediate and nearly full resolution with steroids.  Started on Colchicine once daily, but pain/ swelling returned, another course of steroids needed.  Initial episode 1 m ago... \par Hx in past 2 months sudden onset L shoulder pain/ stiffness spontaneous onset and resolution within 1 wks- MRI + subacromial bursitis.. \par Similar sudden onset R lateral hip pain severe for several days\par ROS otherwise denies inflammatory eye, back, bowel or rash. Denies renal calculi, tophi \par nail deformities.  No constitutional sx and Wt stable for past 15 lbs\par .  \par FH significant for:  \par - gout + FH father and MGM.. \par - Psoriasis:  brother, 1st cousin \par - T2DM both parents \par \par PMH:  \par HTN mild, well controlled \par On HCTZ/ lisinopril for HTN well controlled \par

## 2021-04-28 NOTE — DATA REVIEWED
[FreeTextEntry1] : Labs: \par 6/20 RF 28, CCP > 250, ALT 59 (known NAFLD), UA 8.8, nl CRP, UA, ESR 20. Neg HLAB27, Covid\par HbA1c 5.9\par , HD 45, , \par \par Diagnostics: \par MRI L shoulder 6/20 no evidence of RTC pathology and nl GH/ AC joint w/ no effusions.

## 2021-04-28 NOTE — REVIEW OF SYSTEMS
[As noted in HPI] : as noted in HPI [Heart Rate Is Fast] : fast heart rate [As Noted in HPI] : as noted in HPI [Joint Pain] : joint pain [Joint Swelling] : joint swelling [Joint Stiffness] : joint stiffness [Negative] : Heme/Lymph [Sleep Disturbances] : sleep disturbances [FreeTextEntry3] : no hx inflammatory eye dz  [FreeTextEntry7] : no sx of inflammatory bowel dz [FreeTextEntry8] : denies previous STD  [FreeTextEntry9] : current sudden pauciarticular - nothing for 2 m, now w/ R wrist severe today [de-identified] : ? nail abn and possible rash on R shin months ago- nothing now  [de-identified] : mild weakness in several hands L 3-4 - str.. with paresthesias "strange" sensation into entire hand  [de-identified] : due to L arm/ neck pain

## 2021-04-28 NOTE — PHYSICAL EXAM
[General Appearance - Alert] : alert [General Appearance - In No Acute Distress] : in no acute distress [General Appearance - Well Nourished] : well nourished [General Appearance - Well Developed] : well developed [General Appearance - Well-Appearing] : healthy appearing [Sclera] : the sclera and conjunctiva were normal [PERRL With Normal Accommodation] : pupils were equal in size, round, and reactive to light [Extraocular Movements] : extraocular movements were intact [Auscultation Breath Sounds / Voice Sounds] : lungs were clear to auscultation bilaterally [Heart Rate And Rhythm] : heart rate was normal and rhythm regular [Heart Sounds] : normal S1 and S2 [Heart Sounds Gallop] : no gallops [Murmurs] : no murmurs [Heart Sounds Pericardial Friction Rub] : no pericardial rub [Edema] : there was no peripheral edema [Cervical Lymph Nodes Enlarged Posterior Bilaterally] : posterior cervical [Cervical Lymph Nodes Enlarged Anterior Bilaterally] : anterior cervical [Supraclavicular Lymph Nodes Enlarged Bilaterally] : supraclavicular [No CVA Tenderness] : no ~M costovertebral angle tenderness [No Spinal Tenderness] : no spinal tenderness [Abnormal Walk] : normal gait [Nail Clubbing] : no clubbing  or cyanosis of the fingernails [Musculoskeletal - Swelling] : no joint swelling seen [Motor Tone] : muscle strength and tone were normal [Skin Color & Pigmentation] : normal skin color and pigmentation [] : no rash [Skin Turgor] : normal skin turgor [Deep Tendon Reflexes (DTR)] : deep tendon reflexes were 2+ and symmetric [Sensation] : the sensory exam was normal to light touch and pinprick [No Focal Deficits] : no focal deficits [Oriented To Time, Place, And Person] : oriented to person, place, and time [Impaired Insight] : insight and judgment were intact [Affect] : the affect was normal [FreeTextEntry1] :  str limited R hand 2/2 wrist swelling; L  str strong

## 2021-05-05 ENCOUNTER — APPOINTMENT (OUTPATIENT)
Dept: RADIOLOGY | Facility: CLINIC | Age: 41
End: 2021-05-05
Payer: COMMERCIAL

## 2021-05-05 ENCOUNTER — RESULT REVIEW (OUTPATIENT)
Age: 41
End: 2021-05-05

## 2021-05-05 ENCOUNTER — OUTPATIENT (OUTPATIENT)
Dept: OUTPATIENT SERVICES | Facility: HOSPITAL | Age: 41
LOS: 1 days | End: 2021-05-05
Payer: COMMERCIAL

## 2021-05-05 DIAGNOSIS — L40.50 ARTHROPATHIC PSORIASIS, UNSPECIFIED: ICD-10-CM

## 2021-05-05 PROCEDURE — 73130 X-RAY EXAM OF HAND: CPT | Mod: 26,50

## 2021-05-05 PROCEDURE — 72200 X-RAY EXAM SI JOINTS: CPT

## 2021-05-05 PROCEDURE — 73130 X-RAY EXAM OF HAND: CPT

## 2021-05-05 PROCEDURE — 72200 X-RAY EXAM SI JOINTS: CPT | Mod: 26

## 2021-05-20 LAB
ALBUMIN SERPL ELPH-MCNC: 4.5 G/DL
ALP BLD-CCNC: 71 U/L
ALT SERPL-CCNC: 43 U/L
ANION GAP SERPL CALC-SCNC: 12 MMOL/L
AST SERPL-CCNC: 16 U/L
BASOPHILS # BLD AUTO: 0.07 K/UL
BASOPHILS NFR BLD AUTO: 0.8 %
BILIRUB SERPL-MCNC: 0.3 MG/DL
BUN SERPL-MCNC: 18 MG/DL
CALCIUM SERPL-MCNC: 10.1 MG/DL
CHLORIDE SERPL-SCNC: 98 MMOL/L
CO2 SERPL-SCNC: 27 MMOL/L
CREAT SERPL-MCNC: 1.08 MG/DL
CRP SERPL-MCNC: <3 MG/L
EOSINOPHIL # BLD AUTO: 0.16 K/UL
EOSINOPHIL NFR BLD AUTO: 1.9 %
GLUCOSE SERPL-MCNC: 91 MG/DL
HCT VFR BLD CALC: 44.2 %
HGB BLD-MCNC: 14.1 G/DL
IMM GRANULOCYTES NFR BLD AUTO: 0.7 %
LYMPHOCYTES # BLD AUTO: 3.21 K/UL
LYMPHOCYTES NFR BLD AUTO: 38.7 %
MAN DIFF?: NORMAL
MCHC RBC-ENTMCNC: 28.3 PG
MCHC RBC-ENTMCNC: 31.9 GM/DL
MCV RBC AUTO: 88.6 FL
MONOCYTES # BLD AUTO: 0.83 K/UL
MONOCYTES NFR BLD AUTO: 10 %
NEUTROPHILS # BLD AUTO: 3.97 K/UL
NEUTROPHILS NFR BLD AUTO: 47.9 %
PLATELET # BLD AUTO: 374 K/UL
POTASSIUM SERPL-SCNC: 4.4 MMOL/L
PROT SERPL-MCNC: 7.3 G/DL
RBC # BLD: 4.99 M/UL
RBC # FLD: 14.5 %
SODIUM SERPL-SCNC: 137 MMOL/L
WBC # FLD AUTO: 8.3 K/UL

## 2021-05-27 ENCOUNTER — NON-APPOINTMENT (OUTPATIENT)
Age: 41
End: 2021-05-27

## 2021-05-27 RX ORDER — METHOTREXATE 2.5 MG/1
2.5 TABLET ORAL
Qty: 32 | Refills: 2 | Status: DISCONTINUED | COMMUNITY
Start: 2021-01-19 | End: 2021-05-27

## 2021-05-27 RX ORDER — SULFASALAZINE 500 MG/1
500 TABLET, DELAYED RELEASE ORAL
Qty: 360 | Refills: 1 | Status: DISCONTINUED | COMMUNITY
Start: 2020-07-31 | End: 2021-05-27

## 2021-06-15 ENCOUNTER — NON-APPOINTMENT (OUTPATIENT)
Age: 41
End: 2021-06-15

## 2021-06-17 ENCOUNTER — NON-APPOINTMENT (OUTPATIENT)
Age: 41
End: 2021-06-17

## 2021-07-14 ENCOUNTER — APPOINTMENT (OUTPATIENT)
Dept: RHEUMATOLOGY | Facility: CLINIC | Age: 41
End: 2021-07-14
Payer: COMMERCIAL

## 2021-07-14 VITALS
SYSTOLIC BLOOD PRESSURE: 128 MMHG | OXYGEN SATURATION: 97 % | BODY MASS INDEX: 28.89 KG/M2 | WEIGHT: 190 LBS | TEMPERATURE: 97.9 F | DIASTOLIC BLOOD PRESSURE: 68 MMHG | HEART RATE: 123 BPM

## 2021-07-14 PROCEDURE — 99214 OFFICE O/P EST MOD 30 MIN: CPT

## 2021-07-14 RX ORDER — TRAMADOL HYDROCHLORIDE 50 MG/1
50 TABLET, COATED ORAL
Qty: 6 | Refills: 0 | Status: DISCONTINUED | COMMUNITY
Start: 2021-02-01 | End: 2021-07-14

## 2021-07-14 NOTE — ASSESSMENT
[FreeTextEntry1] : 41 yo .. 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  \par Started Humira 10/20 with no inflammatory arthropathy since.. stopped sSA 12/20.. inadequate response.  \par Eventually added MTX 10 mg- 15 mg wkly (can't increase further 2/2 elevated LFTs) changed to SQ... fairly well controlled till recent surgery.  \par Now following cervical surgery.. had to hold Humira and continued to have severe flare:  R TMJ, b/l hands, feet.    \par Remains off steroids, no active synovitis on exam today... \par May continue to use low dose pred as needed an monitor.. \par Updated labs now \par NOTE :\par Initial presentation sudden onset monoarticular pattern new onset 4/20... 1st episode R 1st MTP but also L shoulder (SAB) and R lateral hip- R elbow, L1 MTP- several PIP joints despite 0.6 mg colchicine daily BID and -added SSA 2000 mg daily..always responsive to 40 mg pred x 1-3 days. \par Gout UA 8.8 with + metabolic syndrome possible, \par Nail dystrophy possible pitting  No tophi  - unchanged   \par - SSA in past initially tolerated then developed Nausea..\par - FH significant for both Gout and PsA..\par - No personal of psoriasis, AS, inflammatory back, bowel or eye dz\par Denies renal calculi\par Baseline labs 6/20 UA 7.0 nl CMP.  updated UA 8.8. \par \par DIscussion:  \par unclear if this is gout or PsA sin psoriasis or RA (presentation SpA but labs support seropositive RA)\par - caution with MTX given NAFLD with elevated ALT at baseline .. nl now ... will need to continue to monitor closely now on higher dose given NAFLD \par May need to consider tx for gout in future but will wait for clarity..  Dual energy CTscan to r/o crystalline arthropathy L foot/ or R wrist could be considered.. or if effusion would send for synovial fluid... hold for now.  Repeat UA level with next labs.  \par Wanted to start with ORencia- but not approved, now on Humira.. no issues w/ LFTs thus far.. ok to continue..  \par \par 2)  metabolic syndrome:   needs to review with PCP, still concerned about persistently elevated LDL and UA along w/ mildly elevated HbA1c \par - HTN: well controlled on treatment \par - overweight BMI 28\par - NAFLD:  persistently elevated ALT 45-50 -> LFTs fine 1/21 -> 3/21 nl \par - HLD: , , HD 45,  w/ ratio 5.3 \par - HbA1c 5.9  -> 5.8\par \par 3) Cervical radiculopathy:  severe HNP at C 4-5 - 5-6 with LUE paresthesias/ weakness, sx indicated. Needs to start PT now.. and continue to use myofascial release.. \par \par \par Plan: \par - continue  MTX (methotrexate) 15 mg by SQ injection. labs mnthly for next 6 m\par \par - Folic acid 1-3 mg daily on all the days you don't take MTX.  Start at 1 mg and increase in having any SE. \par \par - imaging SI joints (still needed) and Dual Energy and CTscan or L foot / or R wrist if swelling returns or persists.. to date episodes resolve within few days.. (if difficult to control) \par \par - for any effusion, need synovial fluid analysis \par \par - may still use steroids PRN for acute inflammation but call to let us know \par \par - let me know how injection does, and when sx indicated so we can coordinate.  \par \par - rto 2m, call immediately if joint sewlling occurs.  \par

## 2021-07-14 NOTE — PHYSICAL EXAM
[General Appearance - Alert] : alert [General Appearance - In No Acute Distress] : in no acute distress [General Appearance - Well Nourished] : well nourished [General Appearance - Well Developed] : well developed [General Appearance - Well-Appearing] : healthy appearing [Sclera] : the sclera and conjunctiva were normal [PERRL With Normal Accommodation] : pupils were equal in size, round, and reactive to light [Extraocular Movements] : extraocular movements were intact [Auscultation Breath Sounds / Voice Sounds] : lungs were clear to auscultation bilaterally [Heart Rate And Rhythm] : heart rate was normal and rhythm regular [Heart Sounds] : normal S1 and S2 [Heart Sounds Gallop] : no gallops [Murmurs] : no murmurs [Heart Sounds Pericardial Friction Rub] : no pericardial rub [Edema] : there was no peripheral edema [Cervical Lymph Nodes Enlarged Posterior Bilaterally] : posterior cervical [Cervical Lymph Nodes Enlarged Anterior Bilaterally] : anterior cervical [Supraclavicular Lymph Nodes Enlarged Bilaterally] : supraclavicular [No CVA Tenderness] : no ~M costovertebral angle tenderness [No Spinal Tenderness] : no spinal tenderness [Abnormal Walk] : normal gait [Nail Clubbing] : no clubbing  or cyanosis of the fingernails [Musculoskeletal - Swelling] : no joint swelling seen [Motor Tone] : muscle strength and tone were normal [Skin Color & Pigmentation] : normal skin color and pigmentation [Skin Turgor] : normal skin turgor [] : no rash [Deep Tendon Reflexes (DTR)] : deep tendon reflexes were 2+ and symmetric [Sensation] : the sensory exam was normal to light touch and pinprick [No Focal Deficits] : no focal deficits [Oriented To Time, Place, And Person] : oriented to person, place, and time [Impaired Insight] : insight and judgment were intact [Affect] : the affect was normal [FreeTextEntry1] :  str limited R hand 2/2 wrist swelling; L  str strong

## 2021-07-14 NOTE — REVIEW OF SYSTEMS
[As noted in HPI] : as noted in HPI [Heart Rate Is Fast] : fast heart rate [As Noted in HPI] : as noted in HPI [Joint Pain] : joint pain [Joint Swelling] : joint swelling [Joint Stiffness] : joint stiffness [Sleep Disturbances] : sleep disturbances [Negative] : Heme/Lymph [FreeTextEntry3] : no hx inflammatory eye dz  [FreeTextEntry7] : no sx of inflammatory bowel dz [FreeTextEntry8] : denies previous STD  [FreeTextEntry9] : current sudden pauciarticular - nothing for 2 m, now w/ R wrist severe today [de-identified] : ? nail abn and possible rash on R shin months ago- nothing now  [de-identified] : mild weakness in several hands L 3-4 - str.. with paresthesias "strange" sensation into entire hand  [de-identified] : due to L arm/ neck pain

## 2021-07-14 NOTE — HISTORY OF PRESENT ILLNESS
[FreeTextEntry1] : 7/14/21 \par - s/p anterior laminectomy with resoluton of L sided cervical radiculopathy- pain resolved but still with numbness in L 3-4 fingers.. \par - doesn't feel colchicine did anything.. and topical voltaren helpful \par - in past few mnths:  had to hold Humira > 6 wk, MTX held x 2 wk, has had intermittent asymmetrical severe synovitis (b/l hands, R TMJ).. when active pain severe.  Currently back on Humira qow and continues MTX (Rasuvo) 15mg... no recent steroids.. \par - completed COVID vaccination w/out issues  \par - Nail pitting noted and now remembers rash on shin- dry scaly but doesn't have image, was seen by Derm dx eczema topical steroids + response - no return\par \par 1) Pauciarthropathy + CCP high titer:  \par 2) Metabolic syndrome:  elevated Lipids, HbA1c and UA 8.8\par ___________________________________________________________________________-\par \par \par (Initial HPI 6/23/20) \par 40 yo .. w/ new onset severe pain and swelling in R 1st MTP recurrently in past month.  \par Immediate and nearly full resolution with steroids.  Started on Colchicine once daily, but pain/ swelling returned, another course of steroids needed.  Initial episode 1 m ago... \par Hx in past 2 months sudden onset L shoulder pain/ stiffness spontaneous onset and resolution within 1 wks- MRI + subacromial bursitis.. \par Similar sudden onset R lateral hip pain severe for several days\par ROS otherwise denies inflammatory eye, back, bowel or rash. Denies renal calculi, tophi \par nail deformities.  No constitutional sx and Wt stable for past 15 lbs\par .  \par FH significant for:  \par - gout + FH father and MGM.. \par - Psoriasis:  brother, 1st cousin \par - T2DM both parents \par \par PMH:  \par HTN mild, well controlled \par On HCTZ/ lisinopril for HTN well controlled \par

## 2021-07-15 ENCOUNTER — NON-APPOINTMENT (OUTPATIENT)
Age: 41
End: 2021-07-15

## 2021-07-18 LAB
ALBUMIN SERPL ELPH-MCNC: 4.5 G/DL
ALP BLD-CCNC: 86 U/L
ALT SERPL-CCNC: 76 U/L
ANION GAP SERPL CALC-SCNC: 12 MMOL/L
AST SERPL-CCNC: 30 U/L
BASOPHILS # BLD AUTO: 0.02 K/UL
BASOPHILS NFR BLD AUTO: 0.5 %
BILIRUB SERPL-MCNC: 0.2 MG/DL
BUN SERPL-MCNC: 14 MG/DL
CALCIUM SERPL-MCNC: 10 MG/DL
CHLORIDE SERPL-SCNC: 101 MMOL/L
CO2 SERPL-SCNC: 27 MMOL/L
CREAT SERPL-MCNC: 1.02 MG/DL
CRP SERPL-MCNC: 5 MG/L
EOSINOPHIL # BLD AUTO: 0.19 K/UL
EOSINOPHIL NFR BLD AUTO: 4.8 %
GLUCOSE SERPL-MCNC: 113 MG/DL
HCT VFR BLD CALC: 40 %
HGB BLD-MCNC: 12.9 G/DL
IMM GRANULOCYTES NFR BLD AUTO: 0.3 %
LYMPHOCYTES # BLD AUTO: 1.79 K/UL
LYMPHOCYTES NFR BLD AUTO: 44.8 %
MAN DIFF?: NORMAL
MCHC RBC-ENTMCNC: 28 PG
MCHC RBC-ENTMCNC: 32.3 GM/DL
MCV RBC AUTO: 86.8 FL
MONOCYTES # BLD AUTO: 0.54 K/UL
MONOCYTES NFR BLD AUTO: 13.5 %
NEUTROPHILS # BLD AUTO: 1.45 K/UL
NEUTROPHILS NFR BLD AUTO: 36.1 %
PLATELET # BLD AUTO: 336 K/UL
POTASSIUM SERPL-SCNC: 4.5 MMOL/L
PROT SERPL-MCNC: 6.7 G/DL
RBC # BLD: 4.61 M/UL
RBC # FLD: 13.5 %
SODIUM SERPL-SCNC: 140 MMOL/L
WBC # FLD AUTO: 4 K/UL

## 2021-09-01 ENCOUNTER — NON-APPOINTMENT (OUTPATIENT)
Age: 41
End: 2021-09-01

## 2021-09-20 ENCOUNTER — LABORATORY RESULT (OUTPATIENT)
Age: 41
End: 2021-09-20

## 2021-09-21 LAB
ALBUMIN SERPL ELPH-MCNC: 4.5 G/DL
ALP BLD-CCNC: 74 U/L
ALT SERPL-CCNC: 42 U/L
ANION GAP SERPL CALC-SCNC: 13 MMOL/L
AST SERPL-CCNC: 17 U/L
BILIRUB SERPL-MCNC: 0.2 MG/DL
BUN SERPL-MCNC: 15 MG/DL
CALCIUM SERPL-MCNC: 9.7 MG/DL
CHLORIDE SERPL-SCNC: 98 MMOL/L
CO2 SERPL-SCNC: 26 MMOL/L
CREAT SERPL-MCNC: 0.91 MG/DL
CRP SERPL-MCNC: 6 MG/L
ERYTHROCYTE [SEDIMENTATION RATE] IN BLOOD BY WESTERGREN METHOD: 42 MM/HR
GLUCOSE SERPL-MCNC: 87 MG/DL
POTASSIUM SERPL-SCNC: 3.9 MMOL/L
PROT SERPL-MCNC: 7.2 G/DL
SODIUM SERPL-SCNC: 137 MMOL/L

## 2021-09-23 LAB
BASOPHILS # BLD AUTO: 0.11 K/UL
BASOPHILS NFR BLD AUTO: 0.9 %
EOSINOPHIL # BLD AUTO: 0.11 K/UL
EOSINOPHIL NFR BLD AUTO: 0.9 %
HCT VFR BLD CALC: 40.5 %
HGB BLD-MCNC: 13.1 G/DL
LYMPHOCYTES # BLD AUTO: 5.17 K/UL
LYMPHOCYTES NFR BLD AUTO: 44.3 %
MAN DIFF?: NORMAL
MCHC RBC-ENTMCNC: 28.1 PG
MCHC RBC-ENTMCNC: 32.3 GM/DL
MCV RBC AUTO: 86.7 FL
MONOCYTES # BLD AUTO: 0.71 K/UL
MONOCYTES NFR BLD AUTO: 6.1 %
NEUTROPHILS # BLD AUTO: 5.19 K/UL
NEUTROPHILS NFR BLD AUTO: 44.4 %
PLATELET # BLD AUTO: 390 K/UL
RBC # BLD: 4.67 M/UL
RBC # FLD: 14.2 %
WBC # FLD AUTO: 11.68 K/UL

## 2021-09-24 ENCOUNTER — APPOINTMENT (OUTPATIENT)
Dept: RHEUMATOLOGY | Facility: CLINIC | Age: 41
End: 2021-09-24
Payer: COMMERCIAL

## 2021-09-24 PROCEDURE — 99442: CPT

## 2021-10-02 ENCOUNTER — RX RENEWAL (OUTPATIENT)
Age: 41
End: 2021-10-02

## 2021-10-02 LAB — URATE SERPL-MCNC: 8 MG/DL

## 2021-10-02 RX ORDER — DICLOFENAC SODIUM 1% 10 MG/G
1 GEL TOPICAL
Qty: 500 | Refills: 1 | Status: ACTIVE | COMMUNITY
Start: 2021-07-14 | End: 1900-01-01

## 2021-10-05 ENCOUNTER — NON-APPOINTMENT (OUTPATIENT)
Age: 41
End: 2021-10-05

## 2021-10-12 ENCOUNTER — APPOINTMENT (OUTPATIENT)
Dept: RHEUMATOLOGY | Facility: CLINIC | Age: 41
End: 2021-10-12
Payer: COMMERCIAL

## 2021-10-12 PROCEDURE — 99213 OFFICE O/P EST LOW 20 MIN: CPT | Mod: 95

## 2021-10-31 RX ORDER — COLCHICINE 0.6 MG/1
0.6 CAPSULE ORAL
Qty: 180 | Refills: 1 | Status: DISCONTINUED | COMMUNITY
Start: 2021-05-27 | End: 2021-10-31

## 2021-10-31 NOTE — PHYSICAL EXAM
[No Focal Deficits] : no focal deficits [FreeTextEntry1] : limited to L w/ trigger point at base neck..

## 2021-10-31 NOTE — ASSESSMENT
[FreeTextEntry1] : 42 yo .. 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 stopped sSA 12/20.. inadequate response.  \par Eventually added MTX 10 mg- 15 mg wkly (can't increase further 2/2 elevated LFTs) changed to SQ... fairly well controlled till recent surgery.  Since then 6/21- have not returned to good control.  \par Increased Humira to wkly with SQ MTX... but continues to have migratory arthropathy- severe at times.. still requiring 20-30 mg prednisone ... for several days... need to physically examine... \par still no psoriasis.. though nails have been dystrophic\par NOTE :\par Initial presentation sudden onset monoarticular pattern new onset 4/20... 1st episode R 1st MTP but also L shoulder (SAB) and R lateral hip- R elbow, L1 MTP- several PIP joints despite 0.6 mg colchicine daily BID and -added SSA 2000 mg daily..always responsive to 40 mg pred x 1-3 days. \par Gout UA 8.8 with + metabolic syndrome possible\par Nail dystrophy possible pitting  No tophi  - unchanged   \par - SSA in past initially tolerated then developed Nausea..\par - FH significant for both Gout and PsA..\par - No personal of psoriasis, AS, inflammatory back, bowel or eye dz\par Denies renal calculi\par Baseline labs 6/20 UA 7.0 nl CMP.  updated UA 8.8. \par \par DIscussion:  \par unclear if this is gout or PsA sin psoriasis or RA (presentation SpA but labs support seropositive RA and no psoria)\par - caution with MTX given NAFLD with elevated ALT at baseline .. nl now ... will need to continue to monitor closely now on higher dose given NAFLD \par May need to consider tx for gout in future but will wait for clarity..  Dual energy CTscan to r/o crystalline arthropathy L foot/ or R wrist could be considered.. or if effusion would send for synovial fluid... hold for now.  Repeat UA level with next labs.  \par Wanted to start with ORencia- but not approved, now on Humira.. no issues w/ LFTs thus far.. ok to continue..  \par \par 2)  metabolic syndrome:   needs to review with PCP, still concerned about persistently elevated LDL and UA along w/ mildly elevated HbA1c \par - HTN: well controlled on treatment \par - overweight BMI 28\par - NAFLD:  persistently elevated ALT 45-50 -> LFTs fine 1/21 -> 3/21 nl \par - HLD: , , HD 45,  w/ ratio 5.3 \par - HbA1c 5.9  -> 5.8\par \par 3) Cervical radiculopathy:  severe HNP at C 4-5 - 5-6 with LUE paresthesias/ weakness, sx indicated. Needs to start PT now.. and continue to use myofascial release.. \par \par \par Plan: \par - continue  MTX (methotrexate) 15 mg by SQ injection. labs mnthly for next 6 m\par \par - Folic acid 1-3 mg daily on all the days you don't take MTX.  Start at 1 mg and increase in having any SE. \par \par - increase daily pred to 20 mg .. still flaring, will likely need to switch from Humira but for now continue wkly injection.... to altered moa.. \par \par - if no flares in next wk lower to 15 mg daily till next visit... need to taper off steroids.. ultimately..\par \par - monitor wt gain... now up 20 lbs.. \par \par - may need  Dual Energy and CTscan or L foot / or R wrist if swelling returns or persists.. to date episodes resolve within few days.. (if difficult to control)   \par \par - rto 2m\par

## 2021-10-31 NOTE — HISTORY OF PRESENT ILLNESS
[Home] : at home, [unfilled] , at the time of the visit. [Medical Office: ([facility])___] : at the medical office located in  [Verbal consent obtained from patient] : the patient, [unfilled] [FreeTextEntry1] : Verbal consent given on 10/12/2021 at 17:50 by ADARSH ACHARYA, [].\par AMWEll failed.. \par \par 10/12/21 \par - continues to have recurrent inflammatory arthritis... in asymmetrical migratory pattern but severe at times.. despite... restarting Humira increased to weekly after severe flares following laminectomy 6/21  and continued Rasuvo.  Requiring 20-30 mg pred with immediate response but recurrently needed. Frustrated.. Was starting to feel better but again exacerbated by COVID booster vaccine.. \par - s/p anterior laminectomy with resoluton of L sided cervical radiculopathy- pain resolved but still with numbness in L 3-4 fingers..   \par - Nail pitting noted and now remembers rash on shin- dry scaly but doesn't have image, was seen by Derm dx eczema topical steroids + response - no return\par \par 1) Pauciarthropathy + CCP high titer:  \par 2) Metabolic syndrome:  elevated Lipids, HbA1c and UA 8.8\par ___________________________________________________________________________-\par \par \par (Initial HPI 6/23/20) \par 40 yo .. w/ new onset severe pain and swelling in R 1st MTP recurrently in past month.  \par Immediate and nearly full resolution with steroids.  Started on Colchicine once daily, but pain/ swelling returned, another course of steroids needed.  Initial episode 1 m ago... \par Hx in past 2 months sudden onset L shoulder pain/ stiffness spontaneous onset and resolution within 1 wks- MRI + subacromial bursitis.. \par Similar sudden onset R lateral hip pain severe for several days\par ROS otherwise denies inflammatory eye, back, bowel or rash. Denies renal calculi, tophi \par nail deformities.  No constitutional sx and Wt stable for past 15 lbs\par .  \par FH significant for:  \par - gout + FH father and MGM.. \par - Psoriasis:  brother, 1st cousin \par - T2DM both parents \par \par PMH:  \par HTN mild, well controlled \par On HCTZ/ lisinopril for HTN well controlled \par

## 2021-10-31 NOTE — REVIEW OF SYSTEMS
[As noted in HPI] : as noted in HPI [Heart Rate Is Fast] : fast heart rate [As Noted in HPI] : as noted in HPI [Joint Pain] : joint pain [Joint Stiffness] : joint stiffness [Joint Swelling] : joint swelling [Sleep Disturbances] : sleep disturbances [Negative] : Heme/Lymph [FreeTextEntry3] : no hx inflammatory eye dz  [FreeTextEntry8] : denies previous STD  [FreeTextEntry7] : no sx of inflammatory bowel dz [FreeTextEntry9] : recurrent sudden pauciarticular -was well controlled till Back sx - and then BOOster vaccine.. , now w/ R wrist severe today [de-identified] : ? nail abn and possible rash on R shin months ago- nothing now  [de-identified] : mild weakness in several hands L 3-4 - str.. with paresthesias "strange" sensation into entire hand  [de-identified] : due to L arm/ neck pain

## 2021-11-26 ENCOUNTER — APPOINTMENT (OUTPATIENT)
Dept: RHEUMATOLOGY | Facility: CLINIC | Age: 41
End: 2021-11-26
Payer: COMMERCIAL

## 2021-11-26 VITALS
DIASTOLIC BLOOD PRESSURE: 72 MMHG | BODY MASS INDEX: 31.63 KG/M2 | SYSTOLIC BLOOD PRESSURE: 122 MMHG | OXYGEN SATURATION: 98 % | WEIGHT: 208 LBS | TEMPERATURE: 98 F | HEART RATE: 116 BPM

## 2021-11-26 DIAGNOSIS — M47.819 SPONDYLOSIS W/OUT MYELOPATHY OR RADICULOPATHY, SITE UNSPECIFIED: ICD-10-CM

## 2021-11-26 PROCEDURE — 20610 DRAIN/INJ JOINT/BURSA W/O US: CPT | Mod: RT

## 2021-11-26 PROCEDURE — 99215 OFFICE O/P EST HI 40 MIN: CPT | Mod: 25

## 2021-11-26 RX ORDER — DICLOFENAC SODIUM 1% 10 MG/G
1 GEL TOPICAL
Qty: 5 | Refills: 1 | Status: DISCONTINUED | COMMUNITY
Start: 2021-02-01 | End: 2021-11-26

## 2021-11-26 RX ORDER — METHYLPRED ACET/NACL,ISO-OS/PF 80 MG/ML
80 VIAL (ML) INJECTION
Qty: 1 | Refills: 0 | Status: COMPLETED | OUTPATIENT
Start: 2021-11-26

## 2021-11-26 RX ADMIN — METHYLPREDNISOLONE ACETATE 0 MG/ML: 80 INJECTION, SUSPENSION INTRA-ARTICULAR; INTRALESIONAL; INTRAMUSCULAR; SOFT TISSUE at 00:00

## 2021-11-26 NOTE — ASSESSMENT
[FreeTextEntry1] : 42 yo .. 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 stopped sSA 12/20.. inadequate response.  \par Eventually added MTX 10 mg- 15 mg wkly (can't increase further 2/2 elevated LFTs) changed to SQ... fairly well controlled now but continues to have intermittent asymmetrical inflammatory episodes.. though less often and quickly responsive to steroids 20-40 mg 1-3 days.. 1-2 x/ mnth.  \par Increased Humira to wkly with SQ MTX..\par still no psoriasis.. though nails have been dystrophic... \par Concerned about ongoing elevation in UA.. see below \par NOTE :\par Initial presentation sudden onset monoarticular pattern new onset 4/20... 1st episode R 1st MTP but also L shoulder (SAB) and R lateral hip- R elbow, L1 MTP- several PIP joints despite 0.6 mg colchicine daily BID and -added SSA 2000 mg daily..always responsive to 40 mg pred x 1-3 days. \par Gout UA 8.8 with + metabolic syndrome possible\par Nail dystrophy possible pitting  No tophi  - unchanged   \par - SSA in past initially tolerated then developed Nausea..\par - FH significant for both Gout and PsA..\par - No personal of psoriasis, AS, inflammatory back, bowel or eye dz\par Denies renal calculi\par Baseline labs 6/20 UA 7.0 nl CMP.  updated UA 8.8. \par \par DIscussion:  \par unclear if this is gout or PsA sin psoriasis or RA (presentation SpA but labs support seropositive RA and no psoriasis)\par - caution with MTX given NAFLD with elevated ALT at baseline .. nl now ... will need to continue to monitor closely now on higher dose given NAFLD \par Given ongoing flares with MTX/ and wkly Humira.. will now treat for gout.. bring UA < 6 and see if this stops the asymmetrical joint flares.. additionally ordered Dual energy CTScan L foot to r/o gouty deposition.  \par Wanted to start with ORencia- but not approved, now on Humira.. no issues w/ LFTs thus far.. ok to continue..  I UA < 6 and continues to flare would then change MOA.. consider ORencia.  \par \par 2)  metabolic syndrome:   needs to review with PCP, still concerned about persistently elevated LDL and UA along w/ mildly elevated HbA1c \par - HTN: well controlled on treatment \par - overweight BMI 28-> 31 now w/ steroids.. 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 (also night shift worker though).  Refer to Dr. Calvo \par \par 3) Cervical radiculopathy:  severe HNP at C 4-5 - 5-6 with LUE paresthesias/ weakness, sx indicated. Better following sx.. paresthesias nearly fully resolved \par \par \par Plan: \par - continue  MTX (methotrexate) 15 mg by SQ injection.\par \par - Folic acid 1-3 mg daily on all the days you don't take MTX.  Start at 1 mg and increase in having any SE. \par \par - increase daily pred to 20 mg .. still flaring, will likely need to switch from Humira but for now continue wkly injection.... to altered moa.. \par \par - restart colchicine 1 tab twice daily if tolerated, if not because of too much GI distress decrease to 1 tab daily. Ok if you miss this dose... \par \par - start NEW allopurinol 100 mg tab once daily, same time every day.. TRY NOT to miss doses because that's what triggers a flare.. (usually very well tolerated) \par \par - again if you have a flare ok to use steroids, but try to minimize, wt gain and skin rash due to steroids.. should be better when not needed anymore \par \par - continue w/ Humira wkly for now \par \par - fatigue: you need to address the poor sleep.  Try to schedule a sleep study.. Ryan Calvo Pulmonologist.. to schedule sleep study.. \par \par - need  Dual Energy and CTscan or L foot now..  \par \par - labs start in 2 wks then every 2 wks and make sure we review to tell you what to do with dose of allopurinol. goal is to get UA < 6- to stop any possible gout.  This will at least remove from the equation..    \par \par - given SAB injection 80 mg depo today.. R side \par \par - rto 2m\par

## 2021-11-26 NOTE — DATA REVIEWED
[FreeTextEntry1] : Labs: \par 6/20 RF 28, CCP > 250, ALT 59 (known NAFLD), UA 8.8, nl CRP, UA, ESR 20. Neg HLAB27, Covid\par HbA1c 5.9- Uric acid 8.8 \par , HD 45, , \par \par Diagnostics: \par MRI L shoulder 6/20 no evidence of RTC pathology and nl GH/ AC joint w/ no effusions.

## 2021-11-26 NOTE — PHYSICAL EXAM
[General Appearance - Alert] : alert [General Appearance - In No Acute Distress] : in no acute distress [Sclera] : the sclera and conjunctiva were normal [PERRL With Normal Accommodation] : pupils were equal in size, round, and reactive to light [Extraocular Movements] : extraocular movements were intact [Neck Appearance] : the appearance of the neck was normal [] : no respiratory distress [Auscultation Breath Sounds / Voice Sounds] : lungs were clear to auscultation bilaterally [Heart Rate And Rhythm] : heart rate was normal and rhythm regular [Heart Sounds] : normal S1 and S2 [Heart Sounds Gallop] : no gallops [Murmurs] : no murmurs [Heart Sounds Pericardial Friction Rub] : no pericardial rub [Edema] : there was no peripheral edema [Cervical Lymph Nodes Enlarged Posterior Bilaterally] : posterior cervical [Cervical Lymph Nodes Enlarged Anterior Bilaterally] : anterior cervical [Supraclavicular Lymph Nodes Enlarged Bilaterally] : supraclavicular [No CVA Tenderness] : no ~M costovertebral angle tenderness [No Spinal Tenderness] : no spinal tenderness [Abnormal Walk] : normal gait [Nail Clubbing] : no clubbing  or cyanosis of the fingernails [Musculoskeletal - Swelling] : no joint swelling seen [Motor Tone] : muscle strength and tone were normal [Sensation] : the sensory exam was normal to light touch and pinprick [No Focal Deficits] : no focal deficits [Oriented To Time, Place, And Person] : oriented to person, place, and time [Impaired Insight] : insight and judgment were intact [Affect] : the affect was normal [FreeTextEntry1] : frustrated with ongoing issues

## 2021-11-26 NOTE — HISTORY OF PRESENT ILLNESS
[FreeTextEntry1] : 11/26/21 \par - Overall better but continues to have spontaneous joint pain/ swelling in asymmetrical migratory pattern involving multiple joints despite MTX 15 mg  wkly and Humira wkly.. definitely less often but any minor trauma to a joint triggers joint inflammation.  \par - today R shoulder pain, limited ROM feels like "gonna flare"\par - CC:  overwhelming fatigue progressively worse, does work night shift, and has hx snoring loudly with known deviated septum.. and wt gain > 20 lbs in past few mnths (fatigue worsening).. no previous sleep study, no witnessed apneic episodes.   \par -  1/21 s/p anterior laminectomy with resolution of L sided cervical radiculopathy- pain resolved but still with numbness in L 3-4 fingers.. slowly improving.   \par - Nail pitting noted and now remembers rash on shin- dry scaly but doesn't have image, was seen by Derm dx eczema topical steroids + response - no return\par \par 1) Pauciarthropathy + CCP high titer:  \par 2) Metabolic syndrome:  elevated Lipids, HbA1c and UA 8.8\par ___________________________________________________________________________-\par \par \par (Initial HPI 6/23/20) \par 40 yo .. w/ new onset severe pain and swelling in R 1st MTP recurrently in past month.  \par Immediate and nearly full resolution with steroids.  Started on Colchicine once daily, but pain/ swelling returned, another course of steroids needed.  Initial episode 1 m ago... \par Hx in past 2 months sudden onset L shoulder pain/ stiffness spontaneous onset and resolution within 1 wks- MRI + subacromial bursitis.. \par Similar sudden onset R lateral hip pain severe for several days\par ROS otherwise denies inflammatory eye, back, bowel or rash. Denies renal calculi, tophi \par nail deformities.  No constitutional sx and Wt stable for past 15 lbs\par .  \par FH significant for:  \par - gout + FH father and MGM.. \par - Psoriasis:  brother, 1st cousin \par - T2DM both parents \par \par PMH:  \par HTN mild, well controlled \par On HCTZ/ lisinopril for HTN well controlled \par

## 2021-11-26 NOTE — PROCEDURE
[Today's Date:] : Date: [unfilled] [Risks] : risks [Benefits] : benefits [Alternatives] : alternatives [Consent Obtained] : written consent was obtained prior to the procedure and is detailed in the patient's record [Patient] : Prior to the start of the procedure a time out was taken and the identity of the patient was confirmed via name and date of birth with the patient. The correct site and the procedure to be performed were confirmed. The correct side was confirmed if applicable. The availability of the correct equipment was verified [Therapeutic] : therapeutic [#1 Site: ______] : #1 site identified in the [unfilled] [Ethyl Chloride] : ethyl chloride [___ ml Inj] : [unfilled] ~Uml [1%] : 1%  [Betadine] : betadine solution [Alcohol] : alcohol [25 gauge 1.5 inch] : A 25 gauge 1.5 inch needle was used [Depomedrol ___ mg] : Depomedrol [unfilled] mg [Tolerated Well] : the patient tolerated the procedure well [Reports Improvement in Symptoms] : reports improvement in symptoms [No Complications] : there were no complications [Instructions Given] : handouts/patient instructions were given to patient [Patient Instructed to Call] : patient was instructed to call if redness at site, a decrease in range of motion or an increase in pain is noted after procedure. [FreeTextEntry1] : ice routinely 20/20 for next 24 hs and call if pain not improved w/ in 72 or  if worsened joint pain, or signs of infection including fevers, chills, redness at site ensues.\par  This is a strong steroid.. can cause anxiety, hungry, irritable, trouble sleeping, rarely causes palpitations or chest pain but if present go to ER and then let me know \par \par

## 2021-11-26 NOTE — REVIEW OF SYSTEMS
[As noted in HPI] : as noted in HPI [Heart Rate Is Fast] : fast heart rate [As Noted in HPI] : as noted in HPI [Joint Pain] : joint pain [Joint Swelling] : joint swelling [Joint Stiffness] : joint stiffness [Sleep Disturbances] : sleep disturbances [Negative] : Heme/Lymph [FreeTextEntry2] : wt gain 20 lbs 190-> 208 [FreeTextEntry3] : no hx inflammatory eye dz  [FreeTextEntry7] : no sx of inflammatory bowel dz [FreeTextEntry8] : denies previous STD  [FreeTextEntry9] : recurrent sudden pauciarticular -was well controlled till Back sx - and then BOOster vaccine.. , now w/ R wrist severe today [de-identified] : ? nail abn and possible rash on R shin months ago- nothing now  [de-identified] : mild weakness in several digits of hands L 3-4 - str..improving...  with paresthesias "strange" sensation into entire hand  [de-identified] : due to L arm/ neck pain- now resolved neck issues and L arm pain but snoring/ wakes 3-4 x/ night and excessive daytime fatigue

## 2021-12-06 ENCOUNTER — APPOINTMENT (OUTPATIENT)
Dept: RHEUMATOLOGY | Facility: CLINIC | Age: 41
End: 2021-12-06
Payer: COMMERCIAL

## 2021-12-06 ENCOUNTER — APPOINTMENT (OUTPATIENT)
Dept: RHEUMATOLOGY | Facility: CLINIC | Age: 41
End: 2021-12-06

## 2021-12-06 VITALS
TEMPERATURE: 98 F | WEIGHT: 208 LBS | SYSTOLIC BLOOD PRESSURE: 128 MMHG | HEART RATE: 117 BPM | DIASTOLIC BLOOD PRESSURE: 72 MMHG | BODY MASS INDEX: 31.63 KG/M2 | OXYGEN SATURATION: 96 %

## 2021-12-06 DIAGNOSIS — M65.9 SYNOVITIS AND TENOSYNOVITIS, UNSPECIFIED: ICD-10-CM

## 2021-12-06 PROCEDURE — 20605 DRAIN/INJ JOINT/BURSA W/O US: CPT | Mod: LT

## 2021-12-06 PROCEDURE — 99214 OFFICE O/P EST MOD 30 MIN: CPT | Mod: 25

## 2021-12-06 PROCEDURE — 96372 THER/PROPH/DIAG INJ SC/IM: CPT | Mod: 59

## 2021-12-07 PROBLEM — M65.9 SYNOVITIS OF LEFT ANKLE: Status: ACTIVE | Noted: 2021-12-07

## 2021-12-08 ENCOUNTER — RESULT REVIEW (OUTPATIENT)
Age: 41
End: 2021-12-08

## 2021-12-12 LAB
ALBUMIN SERPL ELPH-MCNC: 4.4 G/DL
ALP BLD-CCNC: 72 U/L
ALT SERPL-CCNC: 97 U/L
ANION GAP SERPL CALC-SCNC: 13 MMOL/L
AST SERPL-CCNC: 31 U/L
BASOPHILS # BLD AUTO: 0.06 K/UL
BASOPHILS NFR BLD AUTO: 0.8 %
BILIRUB SERPL-MCNC: 0.2 MG/DL
BUN SERPL-MCNC: 14 MG/DL
CALCIUM SERPL-MCNC: 10.2 MG/DL
CHLORIDE SERPL-SCNC: 100 MMOL/L
CO2 SERPL-SCNC: 25 MMOL/L
CREAT SERPL-MCNC: 0.94 MG/DL
CRP SERPL-MCNC: 21 MG/L
EOSINOPHIL # BLD AUTO: 0.1 K/UL
EOSINOPHIL NFR BLD AUTO: 1.4 %
ERYTHROCYTE [SEDIMENTATION RATE] IN BLOOD BY WESTERGREN METHOD: 67 MM/HR
GLUCOSE SERPL-MCNC: 91 MG/DL
HCT VFR BLD CALC: 42.7 %
HGB BLD-MCNC: 13.5 G/DL
IMM GRANULOCYTES NFR BLD AUTO: 1.5 %
LYMPHOCYTES # BLD AUTO: 2.54 K/UL
LYMPHOCYTES NFR BLD AUTO: 35 %
MAN DIFF?: NORMAL
MCHC RBC-ENTMCNC: 27.8 PG
MCHC RBC-ENTMCNC: 31.6 GM/DL
MCV RBC AUTO: 88 FL
MONOCYTES # BLD AUTO: 1.04 K/UL
MONOCYTES NFR BLD AUTO: 14.3 %
NEUTROPHILS # BLD AUTO: 3.41 K/UL
NEUTROPHILS NFR BLD AUTO: 47 %
PLATELET # BLD AUTO: 473 K/UL
POTASSIUM SERPL-SCNC: 4.8 MMOL/L
PROT SERPL-MCNC: 7.2 G/DL
RBC # BLD: 4.85 M/UL
RBC # FLD: 13.7 %
SODIUM SERPL-SCNC: 138 MMOL/L
URATE SERPL-MCNC: 7.2 MG/DL
WBC # FLD AUTO: 7.26 K/UL

## 2021-12-13 ENCOUNTER — APPOINTMENT (OUTPATIENT)
Age: 41
End: 2021-12-13
Payer: COMMERCIAL

## 2021-12-13 ENCOUNTER — OUTPATIENT (OUTPATIENT)
Dept: OUTPATIENT SERVICES | Facility: HOSPITAL | Age: 41
LOS: 1 days | End: 2021-12-13
Payer: COMMERCIAL

## 2021-12-13 ENCOUNTER — RESULT REVIEW (OUTPATIENT)
Age: 41
End: 2021-12-13

## 2021-12-13 ENCOUNTER — APPOINTMENT (OUTPATIENT)
Dept: RADIOLOGY | Facility: CLINIC | Age: 41
End: 2021-12-13
Payer: COMMERCIAL

## 2021-12-13 DIAGNOSIS — G47.33 OBSTRUCTIVE SLEEP APNEA (ADULT) (PEDIATRIC): ICD-10-CM

## 2021-12-13 DIAGNOSIS — M10.9 GOUT, UNSPECIFIED: ICD-10-CM

## 2021-12-13 PROCEDURE — 95810 POLYSOM 6/> YRS 4/> PARAM: CPT

## 2021-12-13 PROCEDURE — 95810 POLYSOM 6/> YRS 4/> PARAM: CPT | Mod: 26

## 2021-12-13 PROCEDURE — 73630 X-RAY EXAM OF FOOT: CPT

## 2021-12-13 PROCEDURE — 73630 X-RAY EXAM OF FOOT: CPT | Mod: 26,50

## 2021-12-15 ENCOUNTER — APPOINTMENT (OUTPATIENT)
Dept: CT IMAGING | Facility: CLINIC | Age: 41
End: 2021-12-15

## 2021-12-20 ENCOUNTER — APPOINTMENT (OUTPATIENT)
Dept: CT IMAGING | Facility: CLINIC | Age: 41
End: 2021-12-20

## 2021-12-22 ENCOUNTER — NON-APPOINTMENT (OUTPATIENT)
Age: 41
End: 2021-12-22

## 2022-01-13 ENCOUNTER — APPOINTMENT (OUTPATIENT)
Dept: CT IMAGING | Facility: CLINIC | Age: 42
End: 2022-01-13
Payer: COMMERCIAL

## 2022-01-13 ENCOUNTER — OUTPATIENT (OUTPATIENT)
Dept: OUTPATIENT SERVICES | Facility: HOSPITAL | Age: 42
LOS: 1 days | End: 2022-01-13
Payer: COMMERCIAL

## 2022-01-13 DIAGNOSIS — M10.9 GOUT, UNSPECIFIED: ICD-10-CM

## 2022-01-13 DIAGNOSIS — Z00.8 ENCOUNTER FOR OTHER GENERAL EXAMINATION: ICD-10-CM

## 2022-01-13 PROCEDURE — 73700 CT LOWER EXTREMITY W/O DYE: CPT | Mod: 26,LT

## 2022-01-13 PROCEDURE — 73700 CT LOWER EXTREMITY W/O DYE: CPT

## 2022-01-13 PROCEDURE — 76377 3D RENDER W/INTRP POSTPROCES: CPT

## 2022-01-13 PROCEDURE — 76377 3D RENDER W/INTRP POSTPROCES: CPT | Mod: 26

## 2022-01-15 LAB
ALBUMIN SERPL ELPH-MCNC: 4.8 G/DL
ALP BLD-CCNC: 71 U/L
ALT SERPL-CCNC: 166 U/L
ANION GAP SERPL CALC-SCNC: 16 MMOL/L
AST SERPL-CCNC: 49 U/L
BASOPHILS # BLD AUTO: 0.03 K/UL
BASOPHILS NFR BLD AUTO: 0.3 %
BILIRUB SERPL-MCNC: 0.3 MG/DL
BUN SERPL-MCNC: 16 MG/DL
CALCIUM SERPL-MCNC: 10.2 MG/DL
CHLORIDE SERPL-SCNC: 99 MMOL/L
CO2 SERPL-SCNC: 24 MMOL/L
CREAT SERPL-MCNC: 0.97 MG/DL
CRP SERPL-MCNC: <3 MG/L
EOSINOPHIL # BLD AUTO: 0.07 K/UL
EOSINOPHIL NFR BLD AUTO: 0.6 %
ERYTHROCYTE [SEDIMENTATION RATE] IN BLOOD BY WESTERGREN METHOD: 25 MM/HR
GLUCOSE SERPL-MCNC: 83 MG/DL
HCT VFR BLD CALC: 41.9 %
HGB BLD-MCNC: 13.4 G/DL
IMM GRANULOCYTES NFR BLD AUTO: 0.7 %
LYMPHOCYTES # BLD AUTO: 3.41 K/UL
LYMPHOCYTES NFR BLD AUTO: 30.5 %
MAN DIFF?: NORMAL
MCHC RBC-ENTMCNC: 27.9 PG
MCHC RBC-ENTMCNC: 32 GM/DL
MCV RBC AUTO: 87.3 FL
MONOCYTES # BLD AUTO: 1.27 K/UL
MONOCYTES NFR BLD AUTO: 11.4 %
NEUTROPHILS # BLD AUTO: 6.31 K/UL
NEUTROPHILS NFR BLD AUTO: 56.5 %
PLATELET # BLD AUTO: 330 K/UL
POTASSIUM SERPL-SCNC: 4.3 MMOL/L
PROT SERPL-MCNC: 7.2 G/DL
RBC # BLD: 4.8 M/UL
RBC # FLD: 15.1 %
SODIUM SERPL-SCNC: 140 MMOL/L
URATE SERPL-MCNC: 7.1 MG/DL
WBC # FLD AUTO: 11.17 K/UL

## 2022-01-18 ENCOUNTER — NON-APPOINTMENT (OUTPATIENT)
Age: 42
End: 2022-01-18

## 2022-01-19 ENCOUNTER — NON-APPOINTMENT (OUTPATIENT)
Age: 42
End: 2022-01-19

## 2022-01-19 RX ORDER — METHYLPRED ACET/NACL,ISO-OS/PF 40 MG/ML
40 VIAL (ML) INJECTION
Qty: 1 | Refills: 0 | Status: COMPLETED | OUTPATIENT
Start: 2022-01-19

## 2022-01-19 RX ORDER — METHYLPRED ACET/NACL,ISO-OS/PF 80 MG/ML
80 VIAL (ML) INJECTION
Qty: 1 | Refills: 0 | Status: COMPLETED | OUTPATIENT
Start: 2022-01-19

## 2022-01-19 RX ADMIN — METHYLPREDNISOLONE ACETATE MG/ML: 40 INJECTION, SUSPENSION INTRA-ARTICULAR; INTRALESIONAL; INTRAMUSCULAR; SOFT TISSUE at 00:00

## 2022-01-19 RX ADMIN — METHYLPREDNISOLONE ACETATE MG/ML: 80 INJECTION, SUSPENSION INTRA-ARTICULAR; INTRALESIONAL; INTRAMUSCULAR; SOFT TISSUE at 00:00

## 2022-01-19 NOTE — ASSESSMENT
[FreeTextEntry1] : 42 yo .. 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 stopped sSA 12/20.. inadequate response.  \par Eventually added MTX 10 mg- 15 mg wkly (can't increase further 2/2 elevated LFTs) changed to SQ... fairly well controlled now but continues to have intermittent asymmetrical inflammatory episodes.. though less often and quickly responsive to steroids 20-40 mg 1-3 days.. 1-2 x/ mnth.  \par Increased Humira to wkly with SQ MTX..\par still no psoriasis.. though nails have been dystrophic... \par Concerned about ongoing elevation in UA.. see below \par NOTE :\par Initial presentation sudden onset monoarticular pattern new onset 4/20... 1st episode R 1st MTP but also L shoulder (SAB) and R lateral hip- R elbow, L1 MTP- several PIP joints despite 0.6 mg colchicine daily BID and -added SSA 2000 mg daily..always responsive to 40 mg pred x 1-3 days. \par Gout UA 8.8 with + metabolic syndrome possible\par Nail dystrophy possible pitting  No tophi  - unchanged   \par - SSA in past initially tolerated then developed Nausea..\par - FH significant for both Gout and PsA..\par - No personal of psoriasis, AS, inflammatory back, bowel or eye dz\par Denies renal calculi\par Baseline labs 6/20 UA 7.0 nl CMP.  updated UA 8.8. \par \par DIscussion:  \par unclear if this is gout or PsA sin psoriasis or RA (presentation SpA but labs support seropositive RA and no psoriasis)\par - caution with MTX given NAFLD with elevated ALT at baseline .. nl now ... will need to continue to monitor closely now on higher dose given NAFLD \par Given ongoing flares with MTX/ and wkly Humira.. will now treat for gout.. bring UA < 6 and see if this stops the asymmetrical joint flares.. additionally ordered Dual energy CTScan L foot to r/o gouty deposition.  \par Wanted to start with ORencia- but not approved, now on Humira.. no issues w/ LFTs thus far.. ok to continue..  I UA < 6 and continues to flare would then change MOA.. consider ORencia.  \par \par 2)  metabolic syndrome:   needs to review with PCP, still concerned about persistently elevated LDL and UA along w/ mildly elevated HbA1c \par - HTN: well controlled on treatment \par - overweight BMI 28-> 31 now w/ steroids.. 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 (also night shift worker though).  Refer to Dr. Calvo \par \par 3) Cervical radiculopathy:  severe HNP at C 4-5 - 5-6 with LUE paresthesias/ weakness, sx indicated. Better following sx.. paresthesias nearly fully resolved \par \par Urgent visit for L ankle synovitis, recent now resolving R side involvement as well, acuity more so suggestive for a crystalline flare. Dry ankle tap, low suspicion for infection. S/p IA ankle injection as well as depomedrol IM given minimal improvement with high dose PO steroids. \par \par - post procedure instructions provided \par - also recommended to start prednisone 5mg/day for flare ppx while he remains on allopurinol at current dose\par - c/w current dose of MTX and FA\par - c/w current Humira dose\par - agree with primary rheum plan for Dual [employer] and CTscan or L foot to better delineate process of his recurrent flares \par

## 2022-01-19 NOTE — HISTORY OF PRESENT ILLNESS
[FreeTextEntry1] : 12/6/21 -- Urgent appointment for acute synovitis of L ankle, similar sx on R side which improved once he started PO steroids he has at home but then this ankle began. No other active joints, extra-articular manifestations, has been compliant with his meds. No open wounds or infectious sx, no F/C.

## 2022-01-19 NOTE — REVIEW OF SYSTEMS
[FreeTextEntry2] : wt gain 20 lbs 190-> 208 [FreeTextEntry3] : no hx inflammatory eye dz  [FreeTextEntry7] : no sx of inflammatory bowel dz [FreeTextEntry8] : denies previous STD  [FreeTextEntry9] : recurrent sudden pauciarticular -was well controlled till Back sx - and then BOOster vaccine.. , now w/ R wrist severe today [de-identified] : ? nail abn and possible rash on R shin months ago- nothing now  [de-identified] : mild weakness in several digits of hands L 3-4 - str..improving...  with paresthesias "strange" sensation into entire hand  [de-identified] : due to L arm/ neck pain- now resolved neck issues and L arm pain but snoring/ wakes 3-4 x/ night and excessive daytime fatigue

## 2022-01-19 NOTE — PROCEDURE
[FreeTextEntry1] : Procedure: L Ankle arthrocentesis \par \par Verbal consent obtained from ADARSH ACHARYA after discussion of risks/ benefits / alternatives which include but are not limited to pain at injection site, infection, bleeding, skin hypopigmentation. \par \par Site identified, marked and sterilized with Betadine. Ethyl chloride applied for topical anesthetic. \par \par With leg to foot angle at 90 degrees, 22 g needle was inserted at a point just medial to tibialis anterior tendon and just lateral to the medial malleolus, with the needle directed posteriorly. \par No fluid able to be aspirated. \par \par 20 mg of depomedrol and 1 mL of lidocaine injected. Mr. ACHARYA tolerated procedure well and there was minimal blood loss.\par \par Post-procedure instructions provided to Mr. ACHARYA including to avoid strenuous exercise for the next 48 hours and apply cold compresses to joint q6 hours for next 24 hours. Advised to notify the office and be evaluated at ED/ UC if worsening pain, swelling, warmth, or bleeding from site or if he develops a fever, chills. Pt verbalized understanding.\par \par ---------\par Consent obtained for IM steroid injection. L deltoid prepped with alcohol, depomedrol 80mg administered IM, Band-Aid applied. Pt tolerated well. \par  [Other Date:___] : Date: [unfilled]

## 2022-01-28 ENCOUNTER — APPOINTMENT (OUTPATIENT)
Dept: RHEUMATOLOGY | Facility: CLINIC | Age: 42
End: 2022-01-28
Payer: COMMERCIAL

## 2022-01-28 PROCEDURE — 99442: CPT

## 2022-02-20 LAB
ALBUMIN SERPL ELPH-MCNC: 4.6 G/DL
ALP BLD-CCNC: 77 U/L
ALT SERPL-CCNC: 156 U/L
ANION GAP SERPL CALC-SCNC: 13 MMOL/L
AST SERPL-CCNC: 52 U/L
BASOPHILS # BLD AUTO: 0.05 K/UL
BASOPHILS NFR BLD AUTO: 0.9 %
BILIRUB SERPL-MCNC: 0.3 MG/DL
BUN SERPL-MCNC: 17 MG/DL
CALCIUM SERPL-MCNC: 9.8 MG/DL
CHLORIDE SERPL-SCNC: 101 MMOL/L
CO2 SERPL-SCNC: 25 MMOL/L
CREAT SERPL-MCNC: 1.19 MG/DL
CRP SERPL-MCNC: <3 MG/L
EOSINOPHIL # BLD AUTO: 0.2 K/UL
EOSINOPHIL NFR BLD AUTO: 3.4 %
ERYTHROCYTE [SEDIMENTATION RATE] IN BLOOD BY WESTERGREN METHOD: 10 MM/HR
GLUCOSE SERPL-MCNC: 91 MG/DL
HCT VFR BLD CALC: 41.5 %
HGB BLD-MCNC: 13.6 G/DL
IMM GRANULOCYTES NFR BLD AUTO: 0.3 %
LYMPHOCYTES # BLD AUTO: 2.32 K/UL
LYMPHOCYTES NFR BLD AUTO: 40 %
MAN DIFF?: NORMAL
MCHC RBC-ENTMCNC: 28.3 PG
MCHC RBC-ENTMCNC: 32.8 GM/DL
MCV RBC AUTO: 86.5 FL
MONOCYTES # BLD AUTO: 0.97 K/UL
MONOCYTES NFR BLD AUTO: 16.7 %
NEUTROPHILS # BLD AUTO: 2.24 K/UL
NEUTROPHILS NFR BLD AUTO: 38.7 %
PLATELET # BLD AUTO: 314 K/UL
POTASSIUM SERPL-SCNC: 4.2 MMOL/L
PROT SERPL-MCNC: 6.9 G/DL
RBC # BLD: 4.8 M/UL
RBC # FLD: 14.5 %
SODIUM SERPL-SCNC: 138 MMOL/L
URATE SERPL-MCNC: 6.1 MG/DL
WBC # FLD AUTO: 5.8 K/UL

## 2022-03-29 ENCOUNTER — APPOINTMENT (OUTPATIENT)
Dept: RHEUMATOLOGY | Facility: CLINIC | Age: 42
End: 2022-03-29
Payer: COMMERCIAL

## 2022-03-29 PROCEDURE — 99442: CPT

## 2022-03-29 RX ORDER — METHYLPRED ACET/NACL,ISO-OS/PF 80 MG/ML
80 VIAL (ML) INJECTION
Qty: 1 | Refills: 0 | Status: DISCONTINUED | OUTPATIENT
Start: 2021-12-06 | End: 2022-03-29

## 2022-03-29 RX ORDER — HYDROCHLOROTHIAZIDE 12.5 MG/1
12.5 CAPSULE ORAL
Qty: 90 | Refills: 0 | Status: DISCONTINUED | COMMUNITY
Start: 2021-05-30 | End: 2022-03-29

## 2022-03-31 ENCOUNTER — APPOINTMENT (OUTPATIENT)
Dept: RADIOLOGY | Facility: CLINIC | Age: 42
End: 2022-03-31
Payer: COMMERCIAL

## 2022-03-31 ENCOUNTER — OUTPATIENT (OUTPATIENT)
Dept: OUTPATIENT SERVICES | Facility: HOSPITAL | Age: 42
LOS: 1 days | End: 2022-03-31
Payer: COMMERCIAL

## 2022-03-31 DIAGNOSIS — L40.50 ARTHROPATHIC PSORIASIS, UNSPECIFIED: ICD-10-CM

## 2022-03-31 PROCEDURE — 73521 X-RAY EXAM HIPS BI 2 VIEWS: CPT

## 2022-03-31 PROCEDURE — 73521 X-RAY EXAM HIPS BI 2 VIEWS: CPT | Mod: 26

## 2022-04-12 LAB
ALBUMIN SERPL ELPH-MCNC: 4.8 G/DL
ALP BLD-CCNC: 80 U/L
ALT SERPL-CCNC: 183 U/L
ANION GAP SERPL CALC-SCNC: 12 MMOL/L
AST SERPL-CCNC: 68 U/L
BASOPHILS # BLD AUTO: 0.04 K/UL
BASOPHILS NFR BLD AUTO: 0.7 %
BILIRUB SERPL-MCNC: 0.5 MG/DL
BUN SERPL-MCNC: 14 MG/DL
CALCIUM SERPL-MCNC: 9.9 MG/DL
CHLORIDE SERPL-SCNC: 99 MMOL/L
CO2 SERPL-SCNC: 26 MMOL/L
CREAT SERPL-MCNC: 0.96 MG/DL
CRP SERPL-MCNC: 6 MG/L
EGFR: 103 ML/MIN/1.73M2
EOSINOPHIL # BLD AUTO: 0.14 K/UL
EOSINOPHIL NFR BLD AUTO: 2.5 %
ERYTHROCYTE [SEDIMENTATION RATE] IN BLOOD BY WESTERGREN METHOD: 33 MM/HR
GLUCOSE SERPL-MCNC: 105 MG/DL
HCT VFR BLD CALC: 41.5 %
HGB BLD-MCNC: 13.4 G/DL
IMM GRANULOCYTES NFR BLD AUTO: 0.4 %
LYMPHOCYTES # BLD AUTO: 1.91 K/UL
LYMPHOCYTES NFR BLD AUTO: 34.5 %
MAN DIFF?: NORMAL
MCHC RBC-ENTMCNC: 27.7 PG
MCHC RBC-ENTMCNC: 32.3 GM/DL
MCV RBC AUTO: 85.9 FL
MONOCYTES # BLD AUTO: 0.85 K/UL
MONOCYTES NFR BLD AUTO: 15.3 %
NEUTROPHILS # BLD AUTO: 2.58 K/UL
NEUTROPHILS NFR BLD AUTO: 46.6 %
PLATELET # BLD AUTO: 328 K/UL
POTASSIUM SERPL-SCNC: 4.1 MMOL/L
PROT SERPL-MCNC: 7 G/DL
RBC # BLD: 4.83 M/UL
RBC # FLD: 13.9 %
SODIUM SERPL-SCNC: 137 MMOL/L
URATE SERPL-MCNC: 5.9 MG/DL
WBC # FLD AUTO: 5.54 K/UL

## 2022-04-21 ENCOUNTER — NON-APPOINTMENT (OUTPATIENT)
Age: 42
End: 2022-04-21

## 2022-04-26 ENCOUNTER — APPOINTMENT (OUTPATIENT)
Dept: OTOLARYNGOLOGY | Facility: CLINIC | Age: 42
End: 2022-04-26
Payer: COMMERCIAL

## 2022-04-26 VITALS
HEIGHT: 68 IN | SYSTOLIC BLOOD PRESSURE: 125 MMHG | BODY MASS INDEX: 29.55 KG/M2 | HEART RATE: 88 BPM | WEIGHT: 195 LBS | DIASTOLIC BLOOD PRESSURE: 73 MMHG

## 2022-04-26 DIAGNOSIS — Z83.3 FAMILY HISTORY OF DIABETES MELLITUS: ICD-10-CM

## 2022-04-26 DIAGNOSIS — Z80.9 FAMILY HISTORY OF MALIGNANT NEOPLASM, UNSPECIFIED: ICD-10-CM

## 2022-04-26 DIAGNOSIS — Z78.9 OTHER SPECIFIED HEALTH STATUS: ICD-10-CM

## 2022-04-26 PROCEDURE — 31231 NASAL ENDOSCOPY DX: CPT

## 2022-04-26 PROCEDURE — 99204 OFFICE O/P NEW MOD 45 MIN: CPT | Mod: 25

## 2022-04-26 NOTE — ASSESSMENT
[FreeTextEntry1] : Maksim Shah presents for evaluation of severe obstructive sleep apnea. He had a prior sleep study done showing an AHI of 69.9. He is not currently under treatment. He also notes history of allergies and chronic rhinitis along with symptoms of chronic sinusitis with recurrent sinus infections. He feels he is currently having ani nfection. Nasal endoscopy shows bilateral septal deviation, thick drainage, and mucosal inflammation. Flexible laryngoscopy shows some concentric nasopharyngeal collapse, no masses or lesions, or inflammation. Will treat his chronic sinusitis with maximal medical therapy, hten obtain CT sinus. Will start auto-Pap. If patient is unable to tolerate, can consider septoplasty.\par \par - Neilmed rinses BID x 3 week.s\par - Fluticasone 2 sprays BID x 3 weeks.\par - Augmentin x 10 days. Side effects were discussed and include but are not limited to nausea, vomiting, diarrhea, and skin rash.\par - CT sinus in 2-3 weeks.\par - Auto-pap\par - Follow up in 3 weeks.

## 2022-04-26 NOTE — PHYSICAL EXAM
[Nasal Endoscopy Performed] : nasal endoscopy was performed, see procedure section for findings [] : septum deviated bilaterally [Midline] : trachea located in midline position [Laryngoscopy Performed] : laryngoscopy was performed, see procedure section for findings [Normal] : no rashes

## 2022-04-26 NOTE — REVIEW OF SYSTEMS
[Seasonal Allergies] : seasonal allergies [Ear Drainage] : ear drainage [Throat Pain] : throat pain [Joint Pain] : joint pain [Negative] : Heme/Lymph [FreeTextEntry1] : Daytime sleepiness

## 2022-04-26 NOTE — HISTORY OF PRESENT ILLNESS
[de-identified] : Maksim Shah is a 42 yo male with hx HTN and RA who presents for evaluation of obstructive sleep apnea. He had polysomnography showing AHI of 69.9 with abraham of 67%. He states that he snores at night and has been told that he pauses his breathing at night. He notes excessive daytime somnolence but does not frequently nap during the day. He denies dyspnea at rest or during exertion. He denies chest pain or pressure. He notes intermittent sinus pressure and tonsillitis. He notes three sinus infections per year, each treated with antibiotics. He has never had sinus surgery. He notes chronic postnasal drainage and nasal congestion. He is unclear of laterality of his congestion, but has been told previously he has a deviated septum. His last sinus infection was one month ago. He uses flonase and zyrtec intermittently. He has been allergy testing and this was positive. He denies history of immunotherapy. He denies frequent sneezing and epiphora.

## 2022-05-03 ENCOUNTER — OUTPATIENT (OUTPATIENT)
Dept: OUTPATIENT SERVICES | Facility: HOSPITAL | Age: 42
LOS: 1 days | End: 2022-05-03
Payer: COMMERCIAL

## 2022-05-03 ENCOUNTER — APPOINTMENT (OUTPATIENT)
Dept: CT IMAGING | Facility: CLINIC | Age: 42
End: 2022-05-03
Payer: COMMERCIAL

## 2022-05-03 DIAGNOSIS — J32.9 CHRONIC SINUSITIS, UNSPECIFIED: ICD-10-CM

## 2022-05-03 DIAGNOSIS — Z00.8 ENCOUNTER FOR OTHER GENERAL EXAMINATION: ICD-10-CM

## 2022-05-03 PROCEDURE — 70486 CT MAXILLOFACIAL W/O DYE: CPT | Mod: 26

## 2022-05-03 PROCEDURE — 70486 CT MAXILLOFACIAL W/O DYE: CPT

## 2022-05-10 ENCOUNTER — APPOINTMENT (OUTPATIENT)
Dept: OTOLARYNGOLOGY | Facility: CLINIC | Age: 42
End: 2022-05-10
Payer: COMMERCIAL

## 2022-05-10 ENCOUNTER — APPOINTMENT (OUTPATIENT)
Dept: MRI IMAGING | Facility: CLINIC | Age: 42
End: 2022-05-10

## 2022-05-10 VITALS
HEIGHT: 68 IN | DIASTOLIC BLOOD PRESSURE: 79 MMHG | WEIGHT: 195 LBS | HEART RATE: 102 BPM | BODY MASS INDEX: 29.55 KG/M2 | SYSTOLIC BLOOD PRESSURE: 130 MMHG

## 2022-05-10 PROCEDURE — 99214 OFFICE O/P EST MOD 30 MIN: CPT

## 2022-05-10 NOTE — ASSESSMENT
[FreeTextEntry1] : Maksim Shah presents for follow-up s/p maximal medical therapy for chronic sinusitis. He continues to have nasal congestion and some drainage, although his symptoms are improved. CT sinus shows bilateral diffuse sinus disease as well as septal deviation. We discussed that he is a candidate for bilateral endoscopic sinus surgery, septoplasty, submucous resection of inferior turbinates.\par \par The sinus surgery itself was discussed at length. Potential benefits of the procedure were discussed, though no guarantee was made nor implied. Alternatives of doing nothing versus ongoing medical therapy alone were also discussed. The risks of the procedure were individually discussed in detail as including, but not limited to bleeding, infection, loss of sense of smell, double vision, blindness, CSF leak requiring other procedures to repair, numbness of teeth/and or face, need for revision surgery, brain/skull base injury, anesthetic risks and unexpected risks. The patient understands these risks.  All questions were answered.\par \par Risk of septoplasty and turbinate surgery were discussed with the patient including but not limited to bleeding, infection, septal perforation, empty nose syndrome, numbness of the front two teeth, and loss of sense of smell. Unique concerns related to septal perforation were discussed including mucus crusting, pain, whistling with breathing, and bleeding. Challenges of management of septal perforation were reiterated. The patient understands these risks. All questions were answered.\par \par He will discuss surgery with his wife. Posting was placed. He will need clearance from his rheumatologist prior to the procedure. We discussed that autoimmune processes do make the surgery more challenging with higher risks of bleeding. He understands this. His procedure will likely need to be done at the hospital due to his AHI, and he will possibly need overnight monitoring.\par \par He is still waiting for his auto-Pap.\par \par Continue sinus rinses and flonase BID.\par \par Follow up 1 week postop.\par \par

## 2022-05-10 NOTE — DATA REVIEWED
[de-identified] : CT Sinus 5/3/22:\par FINDINGS:\par Sinocranial & sinoorbital junctions: The lamina papyracea, cribriform plates and fovea ethmoidalis are intact.\par \par Nasal septum and nasal cavity: Rightward nasal septal deviation with bony spur contacting the right inferior turbinate.\par \par Frontal sinuses, drainage pathways, and associated anatomic variants: Mild mucosal thickening in the bilateral frontal sinuses. Severe mucosal thickening along the bilateral frontal sinus outflow tracts. Agger nasi cells are present bilaterally.\par \par Ethmoid sinuses: Moderate to severe mucosal thickening throughout the ethmoid air cells.\par \par Sphenoid sinuses, drainage pathways, and associated variants: Mild mucosal thickening in the bilateral sphenoid sinuses. Mild mucosal thickening along the bilateral sphenoethmoidal recesses. The carotid canals are covered by bone.\par \par Maxillary sinuses, drainage pathways, and associated variants: Air-fluid levels in the bilateral maxillary sinuses with associated mild mucosal thickening. Mucosal thickening along the bilateral ostiomeatal units.\par \par Other:\par Partially visualized intracranial structures: Normal\par Orbits: Normal\par Mastoid air cells: Normal\par \par \par IMPRESSION:\par Chronic inflammatory changes throughout the paranasal sinuses and their respective drainage pathways as detailed above.\par \par Nasal septal deviation. Sinonasal anatomic variations.\par

## 2022-05-10 NOTE — HISTORY OF PRESENT ILLNESS
[de-identified] : Maksim Shah is a 40 yo male with hx HTN and RA who presents for evaluation of obstructive sleep apnea. He had polysomnography showing AHI of 69.9 with abraham of 67%. He states that he snores at night and has been told that he pauses his breathing at night. He notes excessive daytime somnolence but does not frequently nap during the day. He denies dyspnea at rest or during exertion. He denies chest pain or pressure. He notes intermittent sinus pressure and tonsillitis. He notes three sinus infections per year, each treated with antibiotics. He has never had sinus surgery. He notes chronic postnasal drainage and nasal congestion. He is unclear of laterality of his congestion, but has been told previously he has a deviated septum. His last sinus infection was one month ago. He uses flonase and zyrtec intermittently. He has been allergy testing and this was positive. He denies history of immunotherapy. He denies frequent sneezing and epiphora. [FreeTextEntry1] : 5/10/22 - Maksim presents for follow-up. He completed maximal medical therapy with levaquin, sinus rinses, flonase. CT sinus was performed. He notes that his sinus pressure has resolved. He notes mild persistent congestion and some thick, but clear drainage. He denies fevers, chills, vision changes, pain/restriction of extraocular movements. He is still waiting for auto-pap.

## 2022-05-10 NOTE — PHYSICAL EXAM
[] : septum deviated bilaterally [Midline] : trachea located in midline position [Laryngoscopy Performed] : laryngoscopy was performed, see procedure section for findings [Normal] : no rashes

## 2022-05-27 ENCOUNTER — NON-APPOINTMENT (OUTPATIENT)
Age: 42
End: 2022-05-27

## 2022-06-03 ENCOUNTER — OUTPATIENT (OUTPATIENT)
Dept: OUTPATIENT SERVICES | Facility: HOSPITAL | Age: 42
LOS: 1 days | End: 2022-06-03
Payer: COMMERCIAL

## 2022-06-03 ENCOUNTER — APPOINTMENT (OUTPATIENT)
Dept: MRI IMAGING | Facility: CLINIC | Age: 42
End: 2022-06-03
Payer: COMMERCIAL

## 2022-06-03 DIAGNOSIS — M65.4 RADIAL STYLOID TENOSYNOVITIS [DE QUERVAIN]: ICD-10-CM

## 2022-06-03 DIAGNOSIS — M04.9 AUTOINFLAMMATORY SYNDROME, UNSPECIFIED: ICD-10-CM

## 2022-06-03 DIAGNOSIS — M05.9 RHEUMATOID ARTHRITIS WITH RHEUMATOID FACTOR, UNSPECIFIED: ICD-10-CM

## 2022-06-03 DIAGNOSIS — M47.819 SPONDYLOSIS WITHOUT MYELOPATHY OR RADICULOPATHY, SITE UNSPECIFIED: ICD-10-CM

## 2022-06-03 DIAGNOSIS — L40.50 ARTHROPATHIC PSORIASIS, UNSPECIFIED: ICD-10-CM

## 2022-06-03 PROCEDURE — 73721 MRI JNT OF LWR EXTRE W/O DYE: CPT

## 2022-06-03 PROCEDURE — 73721 MRI JNT OF LWR EXTRE W/O DYE: CPT | Mod: 26,LT

## 2022-06-14 ENCOUNTER — APPOINTMENT (OUTPATIENT)
Dept: RHEUMATOLOGY | Facility: CLINIC | Age: 42
End: 2022-06-14
Payer: COMMERCIAL

## 2022-06-14 VITALS
WEIGHT: 205.5 LBS | BODY MASS INDEX: 31.25 KG/M2 | TEMPERATURE: 98 F | DIASTOLIC BLOOD PRESSURE: 76 MMHG | OXYGEN SATURATION: 97 % | HEART RATE: 87 BPM | SYSTOLIC BLOOD PRESSURE: 118 MMHG

## 2022-06-14 PROCEDURE — 99214 OFFICE O/P EST MOD 30 MIN: CPT | Mod: 25

## 2022-06-14 PROCEDURE — 96372 THER/PROPH/DIAG INJ SC/IM: CPT

## 2022-06-14 RX ORDER — FLUTICASONE PROPIONATE 50 UG/1
50 SPRAY, METERED NASAL TWICE DAILY
Qty: 1 | Refills: 3 | Status: DISCONTINUED | COMMUNITY
Start: 2022-04-26 | End: 2022-06-14

## 2022-06-14 RX ORDER — PREDNISONE 5 MG/1
5 TABLET ORAL
Qty: 90 | Refills: 0 | Status: DISCONTINUED | COMMUNITY
Start: 2021-12-07 | End: 2022-06-14

## 2022-06-14 RX ORDER — DOXYCYCLINE 100 MG/1
100 CAPSULE ORAL
Qty: 14 | Refills: 0 | Status: DISCONTINUED | COMMUNITY
Start: 2022-04-26 | End: 2022-06-14

## 2022-06-14 RX ORDER — TRIAMCINOLONE ACETONIDE 80 MG/ML
80 INJECTION, SUSPENSION INTRA-ARTICULAR; INTRAMUSCULAR
Qty: 1 | Refills: 0 | Status: COMPLETED | OUTPATIENT
Start: 2022-06-14

## 2022-06-14 RX ORDER — FLUTICASONE PROPIONATE 50 UG/1
50 SPRAY, METERED NASAL
Qty: 48 | Refills: 0 | Status: DISCONTINUED | COMMUNITY
Start: 2021-08-02 | End: 2022-06-14

## 2022-06-14 RX ORDER — PREDNISONE 5 MG/1
5 TABLET ORAL
Qty: 180 | Refills: 2 | Status: DISCONTINUED | COMMUNITY
Start: 2021-01-19 | End: 2022-06-14

## 2022-06-14 RX ORDER — LEVOFLOXACIN 750 MG/1
750 TABLET, FILM COATED ORAL DAILY
Qty: 7 | Refills: 0 | Status: DISCONTINUED | COMMUNITY
Start: 2022-05-03 | End: 2022-06-14

## 2022-06-14 RX ADMIN — TRIAMCINOLONE ACETONIDE 0 MG/ML: 80 INJECTION, SUSPENSION INTRA-ARTICULAR; INTRAMUSCULAR at 00:00

## 2022-06-14 NOTE — PROCEDURE
[Today's Date:] : Date: [unfilled] [Risks] : risks [Benefits] : benefits [Alternatives] : alternatives [Consent Obtained] : written consent was obtained prior to the procedure and is detailed in the patient's record [Patient] : Prior to the start of the procedure a time out was taken and the identity of the patient was confirmed via name and date of birth with the patient. The correct site and the procedure to be performed were confirmed. The correct side was confirmed if applicable. The availability of the correct equipment was verified [Therapeutic] : therapeutic [#1 Site: ______] : #1 site identified in the [unfilled] [Alcohol] : alcohol [25 gauge 5/8  inch] : A 25 gauge 5/8 inch needle was used [___ml Steriod Preparation] : [unfilled] ml of steriod preparation  [Tolerated Well] : the patient tolerated the procedure well [No Complications] : there were no complications [Instructions Given] : handouts/patient instructions were given to patient [Patient Instructed to Call] : patient was instructed to call if redness at site, a decrease in range of motion or an increase in pain is noted after procedure. [FreeTextEntry1] : \par  This is a strong steroid.. can cause anxiety, hungry, irritable, trouble sleeping, rarely causes palpitations or chest pain but if present go to ER and then let me know - call in 2 days with update\par \par

## 2022-06-14 NOTE — CONSULT LETTER
[Dear  ___] : Dear  [unfilled], [Courtesy Letter:] : I had the pleasure of seeing your patient, [unfilled], in my office today. [Referral Closing:] : Thank you very much for seeing this patient.  If you have any questions, please do not hesitate to contact me. [Sincerely,] : Sincerely, [FreeTextEntry2] : Prasad Avila MD  [FreeTextEntry1] : See below for summary of recent rheum eval: \par \par 40 yo .. 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 stopped sSA 12/20.. inadequate response.  \par Eventually added MTX 10 mg- 15 mg wkly (can't increase further 2/2 elevated LFTs) changed to SQ... fairly well controlled now but continued to have intermittent asymmetrical inflammatory episodes.. though less often and quickly responsive to steroids 20-40 mg 1-3 days.. 1-2 x/ mnth.  \par Increased Humira to wkly with SQ MTX..but still intermittent severe arthropathy... imaging + for gouty deposits in feet.. started Allopurinol (see below)\par Only residual arthropathy L ankle/ achilles fullness medially.. given 80 mg kenalog.. call with response.  Will not lower MTX given ongoing inflammation (UECT 1/22 no comment on any inflammation in ankle) obvious warmth/ ttt noted on exam today.  \par still no psoriasis.. though nails have been dystrophic... \par NOTE :\par Initial presentation sudden onset monoarticular pattern new onset 4/20... 1st episode R 1st MTP but also L shoulder (SAB) and R lateral hip- R elbow, L1 MTP- several PIP joints despite 0.6 mg colchicine daily BID and -added SSA 2000 mg daily..always responsive to 40 mg pred x 1-3 days. \par Gout UA 8.8 with + metabolic syndrome possible\par Nail dystrophy possible pitting  No tophi  - unchanged   \par - SSA in past initially tolerated then developed Nausea..\par - FH significant for both Gout and PsA..\par - No personal of psoriasis, AS, inflammatory back, bowel or eye dz\par Denies renal calculi\par Baseline labs 6/20 UA 7.0 nl CMP.  updated UA 8.8. \par \par DIscussion:  \par unclear if this is gout or PsA sin psoriasis or RA (presentation SpA but labs support seropositive RA and no psoriasis)\par - caution with MTX given NAFLD with elevated ALT at baseline .. nl now ... will need to continue to monitor closely now on higher dose given NAFLD \par Given ongoing flares with MTX/ and wkly Humira.. will now treat for gout.. 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 crystal deposition. Allopurinol started and titrated to 300 mg now UA 5.5.  Well tolerated.. \par - HTN: well controlled on treatment \par - overweight BMI 28-> 31 now w/ steroids.. \par - NAFLD:  persistently elevated ALT 45-50 -> LFTs fine 1/21 -> 3/21 nl \par - HLD: , , HD 45,  w/ ratio 5.3 -> unchanged 6/22.. needs to d/w PCP\par - HbA1c 5.9  -> 5.8\par - Confirmed WILMER:  now on CPAP but little improvement, struggling with tolerating..working w/ ENT provider \par \par 3) Cervical radiculopathy:  severe HNP at C 4-5 - 5-6 with LUE paresthesias/ weakness, sx indicated. Better following sx.. paresthesias nearly fully resolved \par \par \par Plan: \par - continue  MTX (methotrexate) Rasuvo 15 mg by SQ injection.\par \par - Folic acid 1-3 mg daily on all the days you don't take MTX.  Start at 1 mg and increase in having any SE. \par \par - Continue w/ intermittent pred to 20 mg .. for flare, track frequency.. \par \par - stop cholchicine: if you have a severe flare- at earliest onset take 2 tabs right away, then 1 tab 1 hour later.\par \par - Continue w/ 300 mg allopurinol life long... TRY NOT to miss doses because that's what triggers a flare.. (usually very well tolerated) \par \par - again if you have a flare ok to use steroids, but try to minimize, wt gain and skin rash due to steroids.. should be better when not needed anymore \par \par - continue w/ Humira wkly for now ... if doing well next visit could consider every other week..  \par \par - fatigue: you need to continue to address the poor sleep. Continue to work w/ ENT to adjust ..  \par \par - given IM injection 80 mg kenalog today.. R deltoid, call in 2 days with update  \par \par - rto 4m\par  [FreeTextEntry3] : Angie Leslie DNP, ANP-C\par Division or Rheumatology\par [facility]\par \par

## 2022-06-14 NOTE — PHYSICAL EXAM
[General Appearance - Alert] : alert [General Appearance - In No Acute Distress] : in no acute distress [Sclera] : the sclera and conjunctiva were normal [PERRL With Normal Accommodation] : pupils were equal in size, round, and reactive to light [Extraocular Movements] : extraocular movements were intact [Neck Appearance] : the appearance of the neck was normal [] : no respiratory distress [Auscultation Breath Sounds / Voice Sounds] : lungs were clear to auscultation bilaterally [Heart Rate And Rhythm] : heart rate was normal and rhythm regular [Heart Sounds] : normal S1 and S2 [Murmurs] : no murmurs [Heart Sounds Gallop] : no gallops [Heart Sounds Pericardial Friction Rub] : no pericardial rub [Edema] : there was no peripheral edema [Cervical Lymph Nodes Enlarged Posterior Bilaterally] : posterior cervical [Cervical Lymph Nodes Enlarged Anterior Bilaterally] : anterior cervical [Supraclavicular Lymph Nodes Enlarged Bilaterally] : supraclavicular [No CVA Tenderness] : no ~M costovertebral angle tenderness [No Spinal Tenderness] : no spinal tenderness [Abnormal Walk] : normal gait [Nail Clubbing] : no clubbing  or cyanosis of the fingernails [Musculoskeletal - Swelling] : no joint swelling seen [Motor Tone] : muscle strength and tone were normal [Sensation] : the sensory exam was normal to light touch and pinprick [No Focal Deficits] : no focal deficits [Oriented To Time, Place, And Person] : oriented to person, place, and time [Impaired Insight] : insight and judgment were intact [Affect] : the affect was normal [FreeTextEntry1] : frustrated with ongoing issues

## 2022-06-14 NOTE — ASSESSMENT
[FreeTextEntry1] : 42 yo .. 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 stopped sSA 12/20.. inadequate response.  \par Eventually added MTX 10 mg- 15 mg wkly (can't increase further 2/2 elevated LFTs) changed to SQ... fairly well controlled now but continued to have intermittent asymmetrical inflammatory episodes.. though less often and quickly responsive to steroids 20-40 mg 1-3 days.. 1-2 x/ mnth.  \par Increased Humira to wkly with SQ MTX..but still intermittent severe arthropathy... imaging + for gouty deposits in feet.. started Allopurinol (see below)\par Only residual arthropathy L ankle/ achilles fullness medially.. given 80 mg kenalog.. call with response.  Will not lower MTX given ongoing inflammation (UECT 1/22 no comment on any inflammation in ankle) obvious warmth/ ttt noted on exam today.  \par still no psoriasis.. though nails have been dystrophic... \par NOTE :\par Initial presentation sudden onset monoarticular pattern new onset 4/20... 1st episode R 1st MTP but also L shoulder (SAB) and R lateral hip- R elbow, L1 MTP- several PIP joints despite 0.6 mg colchicine daily BID and -added SSA 2000 mg daily..always responsive to 40 mg pred x 1-3 days. \par Gout UA 8.8 with + metabolic syndrome possible\par Nail dystrophy possible pitting  No tophi  - unchanged   \par - SSA in past initially tolerated then developed Nausea..\par - FH significant for both Gout and PsA..\par - No personal of psoriasis, AS, inflammatory back, bowel or eye dz\par Denies renal calculi\par Baseline labs 6/20 UA 7.0 nl CMP.  updated UA 8.8. \par \par DIscussion:  \par unclear if this is gout or PsA sin psoriasis or RA (presentation SpA but labs support seropositive RA and no psoriasis)\par - caution with MTX given NAFLD with elevated ALT at baseline .. nl now ... will need to continue to monitor closely now on higher dose given NAFLD \par Given ongoing flares with MTX/ and wkly Humira.. will now treat for gout.. 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 crystal deposition. Allopurinol started and titrated to 300 mg now UA 5.5.  Well tolerated.. \par - HTN: well controlled on treatment \par - overweight BMI 28-> 31 now w/ steroids.. \par - NAFLD:  persistently elevated ALT 45-50 -> LFTs fine 1/21 -> 3/21 nl \par - HLD: , , HD 45,  w/ ratio 5.3 -> unchanged 6/22.. needs to d/w PCP\par - HbA1c 5.9  -> 5.8\par - Confirmed WILMER:  now on CPAP but little improvement, struggling with tolerating..working w/ ENT provider \par \par 3) Cervical radiculopathy:  severe HNP at C 4-5 - 5-6 with LUE paresthesias/ weakness, sx indicated. Better following sx.. paresthesias nearly fully resolved \par \par \par Plan: \par - continue  MTX (methotrexate) Rasuvo 15 mg by SQ injection.\par \par - Folic acid 1-3 mg daily on all the days you don't take MTX.  Start at 1 mg and increase in having any SE. \par \par - Continue w/ intermittent pred to 20 mg .. for flare, track frequency.. \par \par - stop cholchicine: if you have a severe flare- at earliest onset take 2 tabs right away, then 1 tab 1 hour later.\par \par - Continue w/ 300 mg allopurinol life long... TRY NOT to miss doses because that's what triggers a flare.. (usually very well tolerated) \par \par - again if you have a flare ok to use steroids, but try to minimize, wt gain and skin rash due to steroids.. should be better when not needed anymore \par \par - continue w/ Humira wkly for now ... if doing well next visit could consider every other week..  \par \par - fatigue: you need to continue to address the poor sleep. Continue to work w/ ENT to adjust ..  \par \par - given IM injection 80 mg kenalog today.. R deltoid, call in 2 days with update  \par \par - rto 4m\par

## 2022-06-14 NOTE — REVIEW OF SYSTEMS
[As noted in HPI] : as noted in HPI [As Noted in HPI] : as noted in HPI [Joint Pain] : joint pain [Joint Swelling] : joint swelling [Joint Stiffness] : joint stiffness [Sleep Disturbances] : sleep disturbances [Negative] : Heme/Lymph [Heart Rate Is Fast] : the heart rate was not fast [FreeTextEntry2] : wt gain 20 lbs 190-> 208-> 205 [FreeTextEntry3] : no hx inflammatory eye dz  [FreeTextEntry5] : better no further tachycardia [FreeTextEntry7] : no sx of inflammatory bowel dz [FreeTextEntry8] : denies previous STD  [FreeTextEntry9] : recurrent sudden pauciarticular improved dramatically. [de-identified] : ? nail abn and possible rash on R shin months ago- nothing now  [de-identified] : mild weakness in several digits of hands L 3-4 - str..improving...  with paresthesias "strange" sensation into entire hand  [de-identified] : WILMER confirmed, starting CPAP

## 2022-06-14 NOTE — HISTORY OF PRESENT ILLNESS
[FreeTextEntry1] : 6/14/22\par - Overall better but continues to have spontaneous joint pain/ swelling in asymmetrical migratory pattern- more recently only in L ankle.. warm, ttt and intermittent.  Pred 20 mg x 1-2 days less than once month\par - labs excellent with no APR and UA 5.5 (6/13/22) \par - hand pain intermittently if does too much physically but otherwise w/out swelling.  involving multiple joints despite MTX 15 mg  wkly and Humira wkly.\par - CC:  overwhelming fatigue slowly improving dx w/ WILMER on CPAP struggling to find device that works..\par -  1/21 s/p anterior laminectomy with resolution of L sided cervical radiculopathy- pain resolved but still with numbness in L 3-4 fingers.. slowly improving\par - Nail pitting noted and now remembers rash on shin- dry scaly but doesn't have image, was seen by Derm dx eczema topical steroids + response - no return\par \par 1) Pauciarthropathy + CCP high titer:  \par 2) Metabolic syndrome:  elevated Lipids, HbA1c and UA 8.8\par 3) Cervical radiculopathy s/p laminectomy\par ___________________________________________________________________________-\par \par \par (Initial HPI 6/23/20) \par 40 yo .. w/ new onset severe pain and swelling in R 1st MTP recurrently in past month.  \par Immediate and nearly full resolution with steroids.  Started on Colchicine once daily, but pain/ swelling returned, another course of steroids needed.  Initial episode 1 m ago... \par Hx in past 2 months sudden onset L shoulder pain/ stiffness spontaneous onset and resolution within 1 wks- MRI + subacromial bursitis.. \par Similar sudden onset R lateral hip pain severe for several days\par ROS otherwise denies inflammatory eye, back, bowel or rash. Denies renal calculi, tophi \par nail deformities.  No constitutional sx and Wt stable for past 15 lbs\par .  \par FH significant for:  \par - gout + FH father and MGM.. \par - Psoriasis:  brother, 1st cousin \par - T2DM both parents \par \par PMH:  \par HTN mild, well controlled \par On HCTZ/ lisinopril for HTN well controlled \par

## 2022-06-17 LAB
ALBUMIN SERPL ELPH-MCNC: 4.4 G/DL
ALP BLD-CCNC: 80 U/L
ALT SERPL-CCNC: 109 U/L
ANION GAP SERPL CALC-SCNC: 15 MMOL/L
AST SERPL-CCNC: 45 U/L
BASOPHILS # BLD AUTO: 0.04 K/UL
BASOPHILS NFR BLD AUTO: 0.7 %
BILIRUB SERPL-MCNC: 0.3 MG/DL
BUN SERPL-MCNC: 17 MG/DL
CALCIUM SERPL-MCNC: 10 MG/DL
CHLORIDE SERPL-SCNC: 101 MMOL/L
CHOLEST SERPL-MCNC: 240 MG/DL
CO2 SERPL-SCNC: 21 MMOL/L
COVID-19 SPIKE DOMAIN ANTIBODY INTERPRETATION: POSITIVE
CREAT SERPL-MCNC: 0.86 MG/DL
CRP SERPL-MCNC: <3 MG/L
EGFR: 113 ML/MIN/1.73M2
EOSINOPHIL # BLD AUTO: 0.2 K/UL
EOSINOPHIL NFR BLD AUTO: 3.6 %
ERYTHROCYTE [SEDIMENTATION RATE] IN BLOOD BY WESTERGREN METHOD: 13 MM/HR
ESTIMATED AVERAGE GLUCOSE: 126 MG/DL
GLUCOSE SERPL-MCNC: 99 MG/DL
HBA1C MFR BLD HPLC: 6 %
HCT VFR BLD CALC: 41 %
HDLC SERPL-MCNC: 43 MG/DL
HGB BLD-MCNC: 13.5 G/DL
IMM GRANULOCYTES NFR BLD AUTO: 0.5 %
LDLC SERPL CALC-MCNC: 139 MG/DL
LYMPHOCYTES # BLD AUTO: 2.31 K/UL
LYMPHOCYTES NFR BLD AUTO: 41.4 %
MAN DIFF?: NORMAL
MCHC RBC-ENTMCNC: 28.3 PG
MCHC RBC-ENTMCNC: 32.9 GM/DL
MCV RBC AUTO: 86 FL
MONOCYTES # BLD AUTO: 0.86 K/UL
MONOCYTES NFR BLD AUTO: 15.4 %
NEUTROPHILS # BLD AUTO: 2.14 K/UL
NEUTROPHILS NFR BLD AUTO: 38.4 %
NONHDLC SERPL-MCNC: 197 MG/DL
PLATELET # BLD AUTO: 296 K/UL
POTASSIUM SERPL-SCNC: 4.2 MMOL/L
PROT SERPL-MCNC: 6.4 G/DL
RBC # BLD: 4.77 M/UL
RBC # FLD: 13.8 %
SARS-COV-2 AB SERPL IA-ACNC: >250 U/ML
SODIUM SERPL-SCNC: 137 MMOL/L
TRIGL SERPL-MCNC: 289 MG/DL
URATE SERPL-MCNC: 5.5 MG/DL
WBC # FLD AUTO: 5.58 K/UL

## 2022-08-03 ENCOUNTER — APPOINTMENT (OUTPATIENT)
Dept: OTOLARYNGOLOGY | Facility: CLINIC | Age: 42
End: 2022-08-03

## 2022-08-03 VITALS
SYSTOLIC BLOOD PRESSURE: 146 MMHG | HEIGHT: 68 IN | HEART RATE: 94 BPM | DIASTOLIC BLOOD PRESSURE: 80 MMHG | WEIGHT: 195 LBS | BODY MASS INDEX: 29.55 KG/M2

## 2022-08-03 DIAGNOSIS — J35.8 OTHER CHRONIC DISEASES OF TONSILS AND ADENOIDS: ICD-10-CM

## 2022-08-03 PROCEDURE — 31231 NASAL ENDOSCOPY DX: CPT

## 2022-08-03 PROCEDURE — 99214 OFFICE O/P EST MOD 30 MIN: CPT | Mod: 25

## 2022-08-03 RX ORDER — AZELASTINE HYDROCHLORIDE 137 UG/1
0.1 SPRAY, METERED NASAL
Qty: 30 | Refills: 0 | Status: ACTIVE | COMMUNITY
Start: 2021-08-12

## 2022-08-03 NOTE — ASSESSMENT
[FreeTextEntry1] : Maksim Shah presents for follow-up. He has history of chronic sinusitis and septal deviation. He is having an acute exacerbation which we will treat as below. Sinonasal endoscopy shows bilateral thick drainge and mucosal inflammation, as well as bilateral septal deviation. He has asymmetrically enlarged right tonsil on exam, new compared to previous on exam. Flexible laryngoscopy shows this asymmetry but no abnormal mass or lesion. Will monitor for now and see if it resolves with antibiotics as below. \par \par We previously discussed septoplasty, smr of inferior turbinates, and bilateral FESS. He did not schedule before, but is now interested in surgery.\par \par The sinus surgery itself was discussed at length. Potential benefits of the procedure were discussed, though no guarantee was made nor implied. Alternatives of doing nothing versus ongoing medical therapy alone were also discussed. The risks of the procedure were individually discussed in detail as including, but not limited to bleeding, infection, loss of sense of smell, double vision, blindness, CSF leak requiring other procedures to repair, numbness of teeth/and or face, need for revision surgery, brain/skull base injury, anesthetic risks and unexpected risks. The patient understands these risks. All questions were answered.\par \par Risk of septoplasty and turbinate surgery were discussed with the patient including but not limited to bleeding, infection, septal perforation, empty nose syndrome, numbness of the front two teeth, and loss of sense of smell. Unique concerns related to septal perforation were discussed including mucus crusting, pain, whistling with breathing, and bleeding. Challenges of management of septal perforation were reiterated. The patient understands these risks. All questions were answered.\par \par  Posting was placed. He will need clearance from his rheumatologist prior to the procedure. We discussed that autoimmune processes do make the surgery more challenging with higher risks of bleeding. He understands this. His procedure will likely need to be done at the hospital due to his AHI, and he will possibly need overnight monitoring.\par \par - Sinus rinses BID\par - Flonase BID\par - Doxycycline x 7 days. Side effects of doxycycline use were discussed and include, but are not limited to diarrhea, rash, skin photosensitivity, skin hyperpigmentation, esophagitis, gastrointestinal issues, tooth discoloration, and hepatotoxicity.\par - Follow up in 2 weeks.

## 2022-08-03 NOTE — PHYSICAL EXAM
[Nasal Endoscopy Performed] : nasal endoscopy was performed, see procedure section for findings [] : septum deviated bilaterally [Midline] : trachea located in midline position [Laryngoscopy Performed] : laryngoscopy was performed, see procedure section for findings [Normal] : no rashes [de-identified] : 3+ tonsil on right, 2+ tonsil on left

## 2022-08-03 NOTE — HISTORY OF PRESENT ILLNESS
[de-identified] : Maksim Shah is a 40 yo male with hx HTN and RA who presents for evaluation of obstructive sleep apnea. He had polysomnography showing AHI of 69.9 with abraham of 67%. He states that he snores at night and has been told that he pauses his breathing at night. He notes excessive daytime somnolence but does not frequently nap during the day. He denies dyspnea at rest or during exertion. He denies chest pain or pressure. He notes intermittent sinus pressure and tonsillitis. He notes three sinus infections per year, each treated with antibiotics. He has never had sinus surgery. He notes chronic postnasal drainage and nasal congestion. He is unclear of laterality of his congestion, but has been told previously he has a deviated septum. His last sinus infection was one month ago. He uses flonase and zyrtec intermittently. He has been allergy testing and this was positive. He denies history of immunotherapy. He denies frequent sneezing and epiphora. [FreeTextEntry1] : 5/10/22 - Maksim presents for follow-up. He completed maximal medical therapy with levaquin, sinus rinses, flonase. CT sinus was performed. He notes that his sinus pressure has resolved. He notes mild persistent congestion and some thick, but clear drainage. He denies fevers, chills, vision changes, pain/restriction of extraocular movements. He is still waiting for auto-pap.\par \par 8/3/22 - Mr. Shah presents for follow-up. At last visit, we discussed septoplasty, smr inferior turbinates, and FESS, but he did not schedule this. He notes that he has had a sinus infection for the past few weeks. He was treated with augmentin and medrol dose pack. He has continued postnasal drainage, but resolved sinus pressure. He notes left ear discomfort. He denies fevers or chills. He has been using Netti-Pot twice a day. He is also using flonase. He is taking humira and methotrexate for his seronegative RA. He denies dysphagia, odynophagia. He denies fevers, night sweats, unintentional weight loss, or new neck masses.

## 2022-08-17 ENCOUNTER — APPOINTMENT (OUTPATIENT)
Dept: OTOLARYNGOLOGY | Facility: CLINIC | Age: 42
End: 2022-08-17

## 2022-08-17 VITALS
SYSTOLIC BLOOD PRESSURE: 129 MMHG | BODY MASS INDEX: 29.55 KG/M2 | DIASTOLIC BLOOD PRESSURE: 81 MMHG | HEIGHT: 68 IN | HEART RATE: 90 BPM | WEIGHT: 195 LBS

## 2022-08-17 PROCEDURE — 99213 OFFICE O/P EST LOW 20 MIN: CPT

## 2022-08-17 NOTE — ASSESSMENT
[FreeTextEntry1] : Maksim Shah presents for follow-up of chronic sinusitis and septal deviation. His acute exacerbation has resolved after treatment with doxycycline. He will be scheduling septoplasty, smr of inferior turbinates, and bilateral FESS in the upcoming months. He will continue his topical nasal regimen. At last visit, he was noted to have right tonsillar asymmetry in the setting of his acute sinus exacerbation. Today, his tonsils are relatively symmetric and 2+ in size. Will defer further imaging unless there is worsening asymmetry. He in agreement with this plan.\par \par - Pt to schedule surgery - will need to be at [facility] due to WILMER and will need clearance from rheumatologist.\par - Continue sinus rinses and flonase BID.\par - Follow up after surgery.\par \par

## 2022-08-17 NOTE — PHYSICAL EXAM
[] : septum deviated bilaterally [Midline] : trachea located in midline position [Normal] : no rashes [de-identified] : 2+ tonsils bilaterally

## 2022-08-17 NOTE — HISTORY OF PRESENT ILLNESS
[de-identified] : Maksim Shah is a 40 yo male with hx HTN and RA who presents for evaluation of obstructive sleep apnea. He had polysomnography showing AHI of 69.9 with abraham of 67%. He states that he snores at night and has been told that he pauses his breathing at night. He notes excessive daytime somnolence but does not frequently nap during the day. He denies dyspnea at rest or during exertion. He denies chest pain or pressure. He notes intermittent sinus pressure and tonsillitis. He notes three sinus infections per year, each treated with antibiotics. He has never had sinus surgery. He notes chronic postnasal drainage and nasal congestion. He is unclear of laterality of his congestion, but has been told previously he has a deviated septum. His last sinus infection was one month ago. He uses flonase and zyrtec intermittently. He has been allergy testing and this was positive. He denies history of immunotherapy. He denies frequent sneezing and epiphora. [FreeTextEntry1] : 5/10/22 - Maksim presents for follow-up. He completed maximal medical therapy with levaquin, sinus rinses, flonase. CT sinus was performed. He notes that his sinus pressure has resolved. He notes mild persistent congestion and some thick, but clear drainage. He denies fevers, chills, vision changes, pain/restriction of extraocular movements. He is still waiting for auto-pap.\par \par 8/3/22 - Mr. Shah presents for follow-up. At last visit, we discussed septoplasty, smr inferior turbinates, and FESS, but he did not schedule this. He notes that he has had a sinus infection for the past few weeks. He was treated with augmentin and medrol dose pack. He has continued postnasal drainage, but resolved sinus pressure. He notes left ear discomfort. He denies fevers or chills. He has been using Netti-Pot twice a day. He is also using flonase. He is taking humira and methotrexate for his seronegative RA. He denies dysphagia, odynophagia. He denies fevers, night sweats, unintentional weight loss, or new neck masses.\par \par 8/17/22 - Maksim Shah presents for follow-up. He underwent treatment for sinusitis with doxycycline. He is using sinus rinses and flonase. He notes resolved sinus pressure. He denies nasal congestion but notes thick mucus in the back of his throat likely from postnasal drainage. He denies dysphagia, odynophagia, or sore throat. He denies fevers or chills.

## 2022-09-21 ENCOUNTER — NON-APPOINTMENT (OUTPATIENT)
Age: 42
End: 2022-09-21

## 2022-09-27 ENCOUNTER — APPOINTMENT (OUTPATIENT)
Dept: RHEUMATOLOGY | Facility: CLINIC | Age: 42
End: 2022-09-27

## 2022-09-27 VITALS
OXYGEN SATURATION: 97 % | DIASTOLIC BLOOD PRESSURE: 70 MMHG | HEIGHT: 68 IN | SYSTOLIC BLOOD PRESSURE: 120 MMHG | HEART RATE: 120 BPM | BODY MASS INDEX: 28.79 KG/M2 | WEIGHT: 190 LBS | TEMPERATURE: 96.9 F

## 2022-09-27 PROCEDURE — 99213 OFFICE O/P EST LOW 20 MIN: CPT

## 2022-09-27 RX ORDER — AZITHROMYCIN 250 MG/1
250 TABLET, FILM COATED ORAL
Qty: 6 | Refills: 0 | Status: DISCONTINUED | COMMUNITY
Start: 2022-03-14 | End: 2022-09-27

## 2022-09-27 RX ORDER — METHYLPREDNISOLONE 4 MG/1
4 TABLET ORAL
Qty: 21 | Refills: 0 | Status: DISCONTINUED | COMMUNITY
Start: 2022-07-26 | End: 2022-09-27

## 2022-09-27 RX ORDER — DOXYCYCLINE 100 MG/1
100 CAPSULE ORAL
Qty: 14 | Refills: 0 | Status: DISCONTINUED | COMMUNITY
Start: 2022-08-03 | End: 2022-09-27

## 2022-09-27 RX ORDER — AMOXICILLIN AND CLAVULANATE POTASSIUM 875; 125 MG/1; MG/1
875-125 TABLET, COATED ORAL
Qty: 14 | Refills: 0 | Status: DISCONTINUED | COMMUNITY
Start: 2022-07-26 | End: 2022-09-27

## 2022-09-27 RX ORDER — ONDANSETRON 4 MG/1
4 TABLET, ORALLY DISINTEGRATING ORAL
Qty: 10 | Refills: 0 | Status: DISCONTINUED | COMMUNITY
Start: 2022-04-19 | End: 2022-09-27

## 2022-09-27 NOTE — REVIEW OF SYSTEMS
[As noted in HPI] : as noted in HPI [As Noted in HPI] : as noted in HPI [Joint Pain] : joint pain [Joint Swelling] : joint swelling [Joint Stiffness] : joint stiffness [Sleep Disturbances] : sleep disturbances [Negative] : Heme/Lymph [Heart Rate Is Fast] : the heart rate was not fast [FreeTextEntry2] : wt gain 20 lbs 190-> 208-> 205 [FreeTextEntry3] : no hx inflammatory eye dz  [FreeTextEntry5] : better no further tachycardia [FreeTextEntry7] : no sx of inflammatory bowel dz [FreeTextEntry8] : denies previous STD  [FreeTextEntry9] : recurrent sudden pauciarticular improved dramatically. [de-identified] : ? nail abn and possible rash on R shin months ago- nothing now  [de-identified] : mild weakness in several digits of hands L 3-4 - str..improving...  with paresthesias "strange" sensation into entire hand  [de-identified] : WILMER confirmed, starting CPAP

## 2022-09-27 NOTE — ASSESSMENT
[FreeTextEntry1] : 41 yo .. 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 slowly improving\par \par 1) Inflammatory arthropathy- seropositive RA + RF 40, CCP > 250, neg HLAB27 and nl SI joints\par Started Humira 10/20 and added MTX with intermittent flares every few months still in monoarticular pattern... recent flare R wrist + response to steroids.. , L foot/ achilles still uncomfortable but tolerable.  \par Continues Humira  wkly with SQ MTX 15 mg ..frustrated with still mild disease activity but doesn’t want to make any changes \par Minimal synovitis on exam today \par still no psoriasis.. though nails have been dystrophic... \par NOTE :\par Initial presentation sudden onset monoarticular pattern new onset 4/20... 1st episode R 1st MTP but also L shoulder (SAB) and R lateral hip- R elbow, L1 MTP- several PIP joints despite 0.6 mg colchicine daily BID and -added SSA 2000 mg daily..always responsive to 40 mg pred x 1-3 days. \par Gout UA 8.8 with + metabolic syndrome possible\par Nail dystrophy possible pitting  No tophi  - unchanged   \par - SSA in past initially tolerated then developed Nausea..and inadequate response \par - FH significant for both Gout and PsA..\par - No personal of psoriasis, AS, inflammatory back, bowel or eye dz\par Denies renal calculi\par Baseline labs 6/20 UA 7.0 nl CMP.  updated UA 8.8. \par \par DIscussion:  \par unclear if this is gout or PsA sin psoriasis or RA (presentation SpA but labs support seropositive RA and no psoriasis)\par - caution with MTX given NAFLD with elevated ALT at baseline .. nl now ... will need to continue to monitor closely now on higher dose given NAFLD \par Given ongoing flares with MTX/ and wkly Humira.. will now treat for gout.. 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 crystal deposition. Allopurinol started and titrated to 300 mg now UA 5.5.  Well tolerated.. updated labs needed now \par - HTN: well controlled on treatment \par - overweight BMI 28-> 31 now w/ steroids.. \par - NAFLD:  persistently elevated ALT 45-50 -> LFTs fine 1/21 -> 3/21 nl \par - HLD: , , HD 45,  w/ ratio 5.3 -> unchanged 6/22.. needs to d/w PCP\par - HbA1c 5.9  -> 5.8\par - Confirmed WILMER:  now on CPAP but little improvement, struggling with tolerating..working w/ ENT provider \par \par 3) Cervical radiculopathy:  severe HNP at C 4-5 - 5-6 with LUE paresthesias/ weakness, sx indicated. Better following sx.. paresthesias nearly fully resolved \par \par \par Plan: \par - continue  MTX (methotrexate) Rasuvo 15 mg by SQ injection.\par \par - Folic acid 1-3 mg daily on all the days you don't take MTX.  Start at 1 mg and increase in having any SE. \par \par - Continue w/ intermittent pred to 20 mg .. for flare, track frequency.. \par \par - stop cholchicine: if you have a severe flare- at earliest onset take 2 tabs right away, then 1 tab 1 hour later.\par \par - Continue w/ 300 mg allopurinol life long... TRY NOT to miss doses because that's what triggers a flare.. (usually very well tolerated) \par \par - again if you have a flare ok to use steroids, but try to minimize, wt gain and skin rash due to steroids.. should be better when not needed anymore \par \par - continue w/ Humira wkly for now ... if doing well next visit could consider every other week..  May need to consider change in tx if continues to have intermittent flare \par \par - fatigue: you need to continue to address the poor sleep. Continue to work w/ ENT to adjust ..  \par \par - rto 4m\par

## 2022-09-27 NOTE — HISTORY OF PRESENT ILLNESS
[FreeTextEntry1] : 9/27/2022\par - still intermittent flares of joint pain R wrist recently severe with redness/ warmth, limited ROM- pred for few days with limited benefit, added colchicine 1.2 x 1 then 0.6- eventually resolved less than few days.. and L foot/ ankle at insertion achilles ttt improving with above tx. \par - frustrated by limited activity tolerance, tends to flare when does too much (painting house for few hours, triggered R wrist flare).. actually unable to return to previous FT work as , now applying for early detention.. functionally able to care for self and family, and fully engaged.. since last visit 2-4 flares.. each lasting days.. not weeks\par - c/w MTX/ Humira wkly, rarely misses dose. Off Colchicine for several months now, never misses allopurinol at 300 mg and UA 5.5 last measure.. update needed \par - CC:  overwhelming fatigue slowly improving dx w/ WILMER on CPAP struggling to find device that works..still working on this \par - Nail pitting noted and now remembers rash on shin- dry scaly but doesn't have image, was seen by Derm dx eczema topical steroids + response - no return\par \par 1) Pauciarthropathy + CCP high titer:  \par 2) Metabolic syndrome:  elevated Lipids, HbA1c and UA 8.8\par 3) Cervical radiculopathy s/p laminectomy\par ___________________________________________________________________________-\par \par \par (Initial HPI 6/23/20) \par 38 yo .. w/ new onset severe pain and swelling in R 1st MTP recurrently in past month.  \par Immediate and nearly full resolution with steroids.  Started on Colchicine once daily, but pain/ swelling returned, another course of steroids needed.  Initial episode 1 m ago... \par Hx in past 2 months sudden onset L shoulder pain/ stiffness spontaneous onset and resolution within 1 wks- MRI + subacromial bursitis.. \par Similar sudden onset R lateral hip pain severe for several days\par ROS otherwise denies inflammatory eye, back, bowel or rash. Denies renal calculi, tophi \par nail deformities.  No constitutional sx and Wt stable for past 15 lbs\par .  \par FH significant for:  \par - gout + FH father and MGM.. \par - Psoriasis:  brother, 1st cousin \par - T2DM both parents \par \par PMH:  \par HTN mild, well controlled \par On HCTZ/ lisinopril for HTN well controlled \par

## 2022-10-21 ENCOUNTER — APPOINTMENT (OUTPATIENT)
Dept: OTOLARYNGOLOGY | Facility: CLINIC | Age: 42
End: 2022-10-21

## 2022-10-21 VITALS
BODY MASS INDEX: 29.55 KG/M2 | HEIGHT: 68 IN | WEIGHT: 195 LBS | SYSTOLIC BLOOD PRESSURE: 125 MMHG | DIASTOLIC BLOOD PRESSURE: 89 MMHG | HEART RATE: 73 BPM

## 2022-10-21 DIAGNOSIS — J31.0 CHRONIC RHINITIS: ICD-10-CM

## 2022-10-21 PROCEDURE — 99213 OFFICE O/P EST LOW 20 MIN: CPT | Mod: 25

## 2022-10-21 PROCEDURE — 31575 DIAGNOSTIC LARYNGOSCOPY: CPT

## 2022-10-21 NOTE — ASSESSMENT
[FreeTextEntry1] : Maksim Shah presents for follow-up. He has history of chronic sinusitis and septal deviation. He is using sinus rinses and flonase. He was previously posted for septoplasty, smr of inferior turbinates, and bilateral FESS, but has again not scheduled this. His sinus symptoms are improved on his topical nasal regimen. He notes right throat and ear discomfort. HE previously had right tonsillar asymmetry that resolved. His right tonsil is not significantly enlarged on exam today. He has right tender cervical lymphadenopathy. Flexible laryngoscopy was normal. This could be the beginning of pharyngitis and will treat with antibiotic. If discomfort persists or worsens, or if lymphadenopathy does not resolve, can consider CT neck at next visit.\par \par - Augmentin x 10 days. Side effects were discussed and include but are not limited to nausea, vomiting, diarrhea, and skin rash.\par - Continue sinus rinses and flonase BID.\par - Follow up in 2 weeks

## 2022-10-21 NOTE — HISTORY OF PRESENT ILLNESS
[de-identified] : Maksim Shah is a 40 yo male with hx HTN and RA who presents for evaluation of obstructive sleep apnea. He had polysomnography showing AHI of 69.9 with abraham of 67%. He states that he snores at night and has been told that he pauses his breathing at night. He notes excessive daytime somnolence but does not frequently nap during the day. He denies dyspnea at rest or during exertion. He denies chest pain or pressure. He notes intermittent sinus pressure and tonsillitis. He notes three sinus infections per year, each treated with antibiotics. He has never had sinus surgery. He notes chronic postnasal drainage and nasal congestion. He is unclear of laterality of his congestion, but has been told previously he has a deviated septum. His last sinus infection was one month ago. He uses flonase and zyrtec intermittently. He has been allergy testing and this was positive. He denies history of immunotherapy. He denies frequent sneezing and epiphora. [FreeTextEntry1] : 5/10/22 - Maksim presents for follow-up. He completed maximal medical therapy with levaquin, sinus rinses, flonase. CT sinus was performed. He notes that his sinus pressure has resolved. He notes mild persistent congestion and some thick, but clear drainage. He denies fevers, chills, vision changes, pain/restriction of extraocular movements. He is still waiting for auto-pap.\par \par 8/3/22 - Mr. Shah presents for follow-up. At last visit, we discussed septoplasty, smr inferior turbinates, and FESS, but he did not schedule this. He notes that he has had a sinus infection for the past few weeks. He was treated with augmentin and medrol dose pack. He has continued postnasal drainage, but resolved sinus pressure. He notes left ear discomfort. He denies fevers or chills. He has been using Netti-Pot twice a day. He is also using flonase. He is taking humira and methotrexate for his seronegative RA. He denies dysphagia, odynophagia. He denies fevers, night sweats, unintentional weight loss, or new neck masses.\par \par 8/17/22 - Maksim Shah presents for follow-up. He underwent treatment for sinusitis with doxycycline. He is using sinus rinses and flonase. He notes resolved sinus pressure. He denies nasal congestion but notes thick mucus in the back of his throat likely from postnasal drainage. He denies dysphagia, odynophagia, or sore throat. He denies fevers or chills.\par \par 10/21/22 - Maksim Shah presents for follow-up. He is using his sinus rinses and flonase. He denies sinus pressure, nasal congestion, rhinorrhea, or postnasal drainage. He has not scheduled his septoplasty and FESS. He notes one week of right ear discomfort and right throat discomfort. He denies otorrhea, tinnitus, vertigo, or hearing change. He denies dysphagia but notes some right sided discomfort when swallowing. He denies dyspnea or dysphonia. He feels that his right neck is swollen. He denies fevers or chills.

## 2022-10-21 NOTE — PHYSICAL EXAM
[de-identified] : see below [] : septum deviated bilaterally [Midline] : trachea located in midline position [Laryngoscopy Performed] : laryngoscopy was performed, see procedure section for findings [de-identified] : 2+ tonsils bilaterally. Slight enlargement of right tonsil compared to left. [Normal] : no rashes [de-identified] : Right cervical lymphadenopathy

## 2022-11-04 ENCOUNTER — APPOINTMENT (OUTPATIENT)
Dept: OTOLARYNGOLOGY | Facility: CLINIC | Age: 42
End: 2022-11-04

## 2022-11-14 ENCOUNTER — APPOINTMENT (OUTPATIENT)
Dept: RHEUMATOLOGY | Facility: CLINIC | Age: 42
End: 2022-11-14

## 2022-11-14 VITALS
HEART RATE: 78 BPM | TEMPERATURE: 98 F | WEIGHT: 205 LBS | SYSTOLIC BLOOD PRESSURE: 106 MMHG | BODY MASS INDEX: 31.17 KG/M2 | OXYGEN SATURATION: 97 % | DIASTOLIC BLOOD PRESSURE: 75 MMHG

## 2022-11-14 DIAGNOSIS — M65.9 SYNOVITIS AND TENOSYNOVITIS, UNSPECIFIED: ICD-10-CM

## 2022-11-14 PROCEDURE — 99213 OFFICE O/P EST LOW 20 MIN: CPT

## 2022-11-28 ENCOUNTER — RX RENEWAL (OUTPATIENT)
Age: 42
End: 2022-11-28

## 2022-12-09 NOTE — ASSESSMENT
[FreeTextEntry1] : 43 yo .. 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 slowly improving\par \par 1) Inflammatory arthropathy- seropositive RA + RF 40, CCP > 250, neg HLAB27 and nl SI joints\par Started Humira 10/20 and added MTX with intermittent flares every few months still in monoarticular pattern... recent flare R wrist + response to steroids.. , L foot/ achilles still uncomfortable but tolerable.  \par Continues Humira  wkly with SQ MTX 15 mg ..frustrated with still mild disease activity but doesn’t want to make any changes \par Minimal synovitis on exam today \par still no psoriasis.. though nails have been dystrophic... \par NOTE :\par Initial presentation sudden onset monoarticular pattern new onset 4/20... 1st episode R 1st MTP but also L shoulder (SAB) and R lateral hip- R elbow, L1 MTP- several PIP joints despite 0.6 mg colchicine daily BID and -added SSA 2000 mg daily..always responsive to 40 mg pred x 1-3 days. \par Gout UA 8.8 with + metabolic syndrome possible\par Nail dystrophy possible pitting  No tophi  - unchanged   \par - SSA in past initially tolerated then developed Nausea..and inadequate response \par - FH significant for both Gout and PsA..\par - No personal of psoriasis, AS, inflammatory back, bowel or eye dz\par Denies renal calculi\par Baseline labs 6/20 UA 7.0 nl CMP.  updated UA 8.8. \par \par DIscussion:  \par unclear if this is gout or PsA sin psoriasis or RA (presentation SpA but labs support seropositive RA and no psoriasis)\par - caution with MTX given NAFLD with elevated ALT at baseline .. nl now ... will need to continue to monitor closely now on higher dose given NAFLD \par Given ongoing flares with MTX/ and wkly Humira.. will now treat for gout.. 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 crystal deposition. Allopurinol started and titrated to 300 mg now UA 5.5.  Well tolerated.. updated labs needed now \par - HTN: well controlled on treatment \par - overweight BMI 28-> 31 now w/ steroids.. \par - NAFLD:  persistently elevated ALT 45-50 -> LFTs fine 1/21 -> 3/21 nl \par - HLD: , , HD 45,  w/ ratio 5.3 -> unchanged 6/22.. needs to d/w PCP\par - HbA1c 5.9  -> 5.8\par - Confirmed WILMER:  now on CPAP but little improvement, struggling with tolerating..working w/ ENT provider \par \par 3) Cervical radiculopathy:  severe HNP at C 4-5 - 5-6 with LUE paresthesias/ weakness, sx indicated. Better following sx.. paresthesias nearly fully resolved \par \par \par Plan: \par - continue  MTX (methotrexate) Rasuvo 15 mg by SQ injection.\par \par - Folic acid 1-3 mg daily on all the days you don't take MTX.  Start at 1 mg and increase in having any SE. \par \par - Continue w/ intermittent pred to 20 mg .. for flare, track frequency.. \par \par - stop cholchicine: if you have a severe flare- at earliest onset take 2 tabs right away, then 1 tab 1 hour later.\par \par - Continue w/ 300 mg allopurinol life long... TRY NOT to miss doses because that's what triggers a flare.. (usually very well tolerated) \par \par - again if you have a flare ok to use steroids, but try to minimize, wt gain and skin rash due to steroids.. should be better when not needed anymore \par \par - continue w/ Humira wkly for now ... if doing well next visit could consider every other week..  May need to consider change in tx if continues to have intermittent flare \par \par - fatigue: you need to continue to address the poor sleep. Continue to work w/ ENT to adjust ..  \par \par - rto 4m\par

## 2022-12-09 NOTE — REVIEW OF SYSTEMS
[As noted in HPI] : as noted in HPI [As Noted in HPI] : as noted in HPI [Joint Pain] : joint pain [Joint Swelling] : joint swelling [Joint Stiffness] : joint stiffness [Sleep Disturbances] : sleep disturbances [Negative] : Heme/Lymph [Heart Rate Is Fast] : the heart rate was not fast [FreeTextEntry2] : wt gain 20 lbs 190-> 208-> 205 [FreeTextEntry3] : no hx inflammatory eye dz  [FreeTextEntry5] : better no further tachycardia [FreeTextEntry7] : no sx of inflammatory bowel dz [FreeTextEntry8] : denies previous STD  [FreeTextEntry9] : recurrent sudden pauciarticular improved dramatically. [de-identified] : ? nail abn and possible rash on R shin months ago- nothing now  [de-identified] : mild weakness in several digits of hands L 3-4 - str..improving...  with paresthesias "strange" sensation into entire hand  [de-identified] : WILMER confirmed, starting CPAP

## 2022-12-09 NOTE — CONSULT LETTER
[Dear  ___] : Dear  [unfilled], [Courtesy Letter:] : I had the pleasure of seeing your patient, [unfilled], in my office today. [Referral Closing:] : Thank you very much for seeing this patient.  If you have any questions, please do not hesitate to contact me. [Sincerely,] : Sincerely, [FreeTextEntry2] : Prasad Avila MD  [FreeTextEntry1] : See below for summary of recent rheum eval: \par \par 42 yo .. 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 stopped sSA 12/20.. inadequate response.  \par Eventually added MTX 10 mg- 15 mg wkly (can't increase further 2/2 elevated LFTs) changed to SQ... fairly well controlled now but continued to have intermittent asymmetrical inflammatory episodes.. though less often and quickly responsive to steroids 20-40 mg 1-3 days.. 1-2 x/ mnth.  \par Increased Humira to wkly with SQ MTX..but still intermittent severe arthropathy... imaging + for gouty deposits in feet.. started Allopurinol (see below)\par Only residual arthropathy L ankle/ achilles fullness medially.. given 80 mg kenalog.. call with response.  Will not lower MTX given ongoing inflammation (UECT 1/22 no comment on any inflammation in ankle) obvious warmth/ ttt noted on exam today.  \par still no psoriasis.. though nails have been dystrophic... \par NOTE :\par Initial presentation sudden onset monoarticular pattern new onset 4/20... 1st episode R 1st MTP but also L shoulder (SAB) and R lateral hip- R elbow, L1 MTP- several PIP joints despite 0.6 mg colchicine daily BID and -added SSA 2000 mg daily..always responsive to 40 mg pred x 1-3 days. \par Gout UA 8.8 with + metabolic syndrome possible\par Nail dystrophy possible pitting  No tophi  - unchanged   \par - SSA in past initially tolerated then developed Nausea..\par - FH significant for both Gout and PsA..\par - No personal of psoriasis, AS, inflammatory back, bowel or eye dz\par Denies renal calculi\par Baseline labs 6/20 UA 7.0 nl CMP.  updated UA 8.8. \par \par DIscussion:  \par unclear if this is gout or PsA sin psoriasis or RA (presentation SpA but labs support seropositive RA and no psoriasis)\par - caution with MTX given NAFLD with elevated ALT at baseline .. nl now ... will need to continue to monitor closely now on higher dose given NAFLD \par Given ongoing flares with MTX/ and wkly Humira.. will now treat for gout.. 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 crystal deposition. Allopurinol started and titrated to 300 mg now UA 5.5.  Well tolerated.. \par - HTN: well controlled on treatment \par - overweight BMI 28-> 31 now w/ steroids.. \par - NAFLD:  persistently elevated ALT 45-50 -> LFTs fine 1/21 -> 3/21 nl \par - HLD: , , HD 45,  w/ ratio 5.3 -> unchanged 6/22.. needs to d/w PCP\par - HbA1c 5.9  -> 5.8\par - Confirmed WILMER:  now on CPAP but little improvement, struggling with tolerating..working w/ ENT provider \par \par 3) Cervical radiculopathy:  severe HNP at C 4-5 - 5-6 with LUE paresthesias/ weakness, sx indicated. Better following sx.. paresthesias nearly fully resolved \par \par \par Plan: \par - continue  MTX (methotrexate) Rasuvo 15 mg by SQ injection.\par \par - Folic acid 1-3 mg daily on all the days you don't take MTX.  Start at 1 mg and increase in having any SE. \par \par - Continue w/ intermittent pred to 20 mg .. for flare, track frequency.. \par \par - stop cholchicine: if you have a severe flare- at earliest onset take 2 tabs right away, then 1 tab 1 hour later.\par \par - Continue w/ 300 mg allopurinol life long... TRY NOT to miss doses because that's what triggers a flare.. (usually very well tolerated) \par \par - again if you have a flare ok to use steroids, but try to minimize, wt gain and skin rash due to steroids.. should be better when not needed anymore \par \par - continue w/ Humira wkly for now ... if doing well next visit could consider every other week..  \par \par - fatigue: you need to continue to address the poor sleep. Continue to work w/ ENT to adjust ..  \par \par - given IM injection 80 mg kenalog today.. R deltoid, call in 2 days with update  \par \par - rto 4m\par  [FreeTextEntry3] : Angie Leslie DNP, ANP-C\par Division or Rheumatology\par [facility] [place] [facility]\par \par

## 2022-12-09 NOTE — PHYSICAL EXAM
[General Appearance - Alert] : alert [General Appearance - In No Acute Distress] : in no acute distress [Sclera] : the sclera and conjunctiva were normal [PERRL With Normal Accommodation] : pupils were equal in size, round, and reactive to light [Extraocular Movements] : extraocular movements were intact [Neck Appearance] : the appearance of the neck was normal [] : no respiratory distress [Auscultation Breath Sounds / Voice Sounds] : lungs were clear to auscultation bilaterally [Heart Rate And Rhythm] : heart rate was normal and rhythm regular [Heart Sounds] : normal S1 and S2 [Heart Sounds Gallop] : no gallops [Murmurs] : no murmurs [Heart Sounds Pericardial Friction Rub] : no pericardial rub [Edema] : there was no peripheral edema [Cervical Lymph Nodes Enlarged Posterior Bilaterally] : posterior cervical [Cervical Lymph Nodes Enlarged Anterior Bilaterally] : anterior cervical [Supraclavicular Lymph Nodes Enlarged Bilaterally] : supraclavicular [No CVA Tenderness] : no ~M costovertebral angle tenderness [No Spinal Tenderness] : no spinal tenderness [Nail Clubbing] : no clubbing  or cyanosis of the fingernails [Abnormal Walk] : normal gait [Musculoskeletal - Swelling] : no joint swelling seen [Motor Tone] : muscle strength and tone were normal [Sensation] : the sensory exam was normal to light touch and pinprick [No Focal Deficits] : no focal deficits [Oriented To Time, Place, And Person] : oriented to person, place, and time [Impaired Insight] : insight and judgment were intact [Affect] : the affect was normal [FreeTextEntry1] : frustrated with ongoing issues

## 2022-12-09 NOTE — HISTORY OF PRESENT ILLNESS
[FreeTextEntry1] : 11/14/22\par - still intermittent flares of joint pain R wrist recently severe with redness/ warmth, limited ROM- pred for few days with limited benefit, added colchicine 1.2 x 1 then 0.6- eventually resolved less than few days.. and L foot/ ankle at insertion achilles ttt improving with above tx. \par - frustrated by limited activity tolerance, tends to flare when does too much (painting house for few hours, triggered R wrist flare).. actually unable to return to previous FT work as , now applying for early custodial.. functionally able to care for self and family, and fully engaged.. since last visit 2-4 flares.. each lasting days.. not weeks\par - c/w MTX/ Humira wkly, rarely misses dose. Off Colchicine for several months now, never misses allopurinol at 300 mg and UA 5.5 last measure.. update needed \par - CC:  overwhelming fatigue slowly improving dx w/ WILMER on CPAP struggling to find device that works..still working on this \par - Nail pitting noted and now remembers rash on shin- dry scaly but doesn't have image, was seen by Derm dx eczema topical steroids + response - no return\par \par 1) Pauciarthropathy + CCP high titer:  \par 2) Metabolic syndrome:  elevated Lipids, HbA1c and UA 8.8\par 3) Cervical radiculopathy s/p laminectomy\par 4) Gout\par ___________________________________________________________________________-\par \par \par (Initial HPI 6/23/20) \par 40 yo .. w/ new onset severe pain and swelling in R 1st MTP recurrently in past month.  \par Immediate and nearly full resolution with steroids.  Started on Colchicine once daily, but pain/ swelling returned, another course of steroids needed.  Initial episode 1 m ago... \par Hx in past 2 months sudden onset L shoulder pain/ stiffness spontaneous onset and resolution within 1 wks- MRI + subacromial bursitis.. \par Similar sudden onset R lateral hip pain severe for several days\par ROS otherwise denies inflammatory eye, back, bowel or rash. Denies renal calculi, tophi \par nail deformities.  No constitutional sx and Wt stable for past 15 lbs\par .  \par FH significant for:  \par - gout + FH father and MGM.. \par - Psoriasis:  brother, 1st cousin \par - T2DM both parents \par \par PMH:  \par HTN mild, well controlled \par On HCTZ/ lisinopril for HTN well controlled \par

## 2022-12-13 ENCOUNTER — APPOINTMENT (OUTPATIENT)
Dept: OTOLARYNGOLOGY | Facility: CLINIC | Age: 42
End: 2022-12-13

## 2022-12-13 VITALS — HEART RATE: 76 BPM

## 2022-12-13 VITALS
HEART RATE: 132 BPM | SYSTOLIC BLOOD PRESSURE: 135 MMHG | HEIGHT: 68 IN | DIASTOLIC BLOOD PRESSURE: 87 MMHG | BODY MASS INDEX: 29.55 KG/M2 | WEIGHT: 195 LBS

## 2022-12-13 DIAGNOSIS — J02.9 ACUTE PHARYNGITIS, UNSPECIFIED: ICD-10-CM

## 2022-12-13 DIAGNOSIS — J34.2 DEVIATED NASAL SEPTUM: ICD-10-CM

## 2022-12-13 PROCEDURE — 99213 OFFICE O/P EST LOW 20 MIN: CPT | Mod: 25

## 2022-12-13 PROCEDURE — 31231 NASAL ENDOSCOPY DX: CPT

## 2022-12-13 NOTE — REVIEW OF SYSTEMS
[Post Nasal Drip] : post nasal drip [Ear Pain] : ear pain [Throat Pain] : throat pain [Negative] : Heme/Lymph

## 2022-12-13 NOTE — REASON FOR VISIT
[Subsequent Evaluation] : a subsequent evaluation for [FreeTextEntry2] : Mild drip and lumps on throat, painful ears and tongue

## 2022-12-13 NOTE — HISTORY OF PRESENT ILLNESS
[de-identified] : Maksim Shah is a 42 yo male with hx HTN and RA who presents for evaluation of obstructive sleep apnea. He had polysomnography showing AHI of 69.9 with abraham of 67%. He states that he snores at night and has been told that he pauses his breathing at night. He notes excessive daytime somnolence but does not frequently nap during the day. He denies dyspnea at rest or during exertion. He denies chest pain or pressure. He notes intermittent sinus pressure and tonsillitis. He notes three sinus infections per year, each treated with antibiotics. He has never had sinus surgery. He notes chronic postnasal drainage and nasal congestion. He is unclear of laterality of his congestion, but has been told previously he has a deviated septum. His last sinus infection was one month ago. He uses flonase and zyrtec intermittently. He has been allergy testing and this was positive. He denies history of immunotherapy. He denies frequent sneezing and epiphora. [FreeTextEntry1] : 5/10/22 - Maksim presents for follow-up. He completed maximal medical therapy with levaquin, sinus rinses, flonase. CT sinus was performed. He notes that his sinus pressure has resolved. He notes mild persistent congestion and some thick, but clear drainage. He denies fevers, chills, vision changes, pain/restriction of extraocular movements. He is still waiting for auto-pap.\par \par 8/3/22 - Mr. Shah presents for follow-up. At last visit, we discussed septoplasty, smr inferior turbinates, and FESS, but he did not schedule this. He notes that he has had a sinus infection for the past few weeks. He was treated with augmentin and medrol dose pack. He has continued postnasal drainage, but resolved sinus pressure. He notes left ear discomfort. He denies fevers or chills. He has been using Netti-Pot twice a day. He is also using flonase. He is taking humira and methotrexate for his seronegative RA. He denies dysphagia, odynophagia. He denies fevers, night sweats, unintentional weight loss, or new neck masses.\par \par 8/17/22 - Maksim Shah presents for follow-up. He underwent treatment for sinusitis with doxycycline. He is using sinus rinses and flonase. He notes resolved sinus pressure. He denies nasal congestion but notes thick mucus in the back of his throat likely from postnasal drainage. He denies dysphagia, odynophagia, or sore throat. He denies fevers or chills.\par \par 10/21/22 - Maksim Shah presents for follow-up. He is using his sinus rinses and flonase. He denies sinus pressure, nasal congestion, rhinorrhea, or postnasal drainage. He has not scheduled his septoplasty and FESS. He notes one week of right ear discomfort and right throat discomfort. He denies otorrhea, tinnitus, vertigo, or hearing change. He denies dysphagia but notes some right sided discomfort when swallowing. He denies dyspnea or dysphonia. He feels that his right neck is swollen. He denies fevers or chills.\par \par 12/13/22 - Mr. Shah presents for follow-up. He statss that he is having postnasal drainage. He then developed sore throat, sore tongue, and bilateral ear pain when he swallows. He denies otorrhea but notes some muffled hearing. He denies nasal congestion or sinus pressure. He notes thick postnasal drainage. He denies dysphagia but notes odynophagia.\par \par He has been using sinus rinses and flonase.

## 2022-12-13 NOTE — PHYSICAL EXAM
[de-identified] : see below [Nasal Endoscopy Performed] : nasal endoscopy was performed, see procedure section for findings [] : septum deviated bilaterally [Midline] : trachea located in midline position [Laryngoscopy Performed] : laryngoscopy was performed, see procedure section for findings [de-identified] : Erythema of oropharynx [Normal] : no rashes [de-identified] : Bilateral tender cervical lymphadenopathy

## 2022-12-13 NOTE — ASSESSMENT
[FreeTextEntry1] : Maksim Shah presents for follow-up. He has history of chronic sinusitis and septal deviation. He is using sinus rinses and flonase. He was previously posted for septoplasty, smr of inferior turbinates, and bilateral FESS, but has again not scheduled this. His sinus symptoms remain improved on his topical nasal regimen. Ted notes bilateral ear discomfort and sore throat as well as postnasal drainage. Sinonasal endoscopy shows bilateral septal deviation. Flexible laryngoscopy shows thin postnasal secretions. Will treat with antibiotics for acute pharyngitis. \par \par - Augmentin x 10 days. Side effects were discussed and include but are not limited to nausea, vomiting, diarrhea, and skin rash.\par - Continue sinus rinses and flonase BID.\par - Follow up in 2 weeks.

## 2022-12-27 ENCOUNTER — APPOINTMENT (OUTPATIENT)
Dept: OTOLARYNGOLOGY | Facility: CLINIC | Age: 42
End: 2022-12-27

## 2023-01-31 ENCOUNTER — APPOINTMENT (OUTPATIENT)
Dept: RHEUMATOLOGY | Facility: CLINIC | Age: 43
End: 2023-01-31
Payer: COMMERCIAL

## 2023-01-31 VITALS
TEMPERATURE: 98 F | OXYGEN SATURATION: 97 % | HEIGHT: 68 IN | WEIGHT: 190 LBS | HEART RATE: 86 BPM | DIASTOLIC BLOOD PRESSURE: 70 MMHG | SYSTOLIC BLOOD PRESSURE: 120 MMHG | BODY MASS INDEX: 28.79 KG/M2

## 2023-01-31 DIAGNOSIS — Z00.00 ENCOUNTER FOR GENERAL ADULT MEDICAL EXAMINATION W/OUT ABNORMAL FINDINGS: ICD-10-CM

## 2023-01-31 PROCEDURE — 96372 THER/PROPH/DIAG INJ SC/IM: CPT

## 2023-01-31 PROCEDURE — 99214 OFFICE O/P EST MOD 30 MIN: CPT | Mod: 25

## 2023-01-31 RX ORDER — KETOROLAC TROMETHAMINE 30 MG/ML
30 INJECTION, SOLUTION INTRAMUSCULAR; INTRAVENOUS
Qty: 1 | Refills: 0 | Status: COMPLETED | OUTPATIENT
Start: 2023-01-31

## 2023-01-31 RX ADMIN — KETOROLAC TROMETHAMINE 0 MG/ML: 30 INJECTION, SOLUTION INTRAMUSCULAR; INTRAVENOUS at 00:00

## 2023-02-10 LAB
AA PROT SER-MCNC: 4.7 UG/ML
ALBUMIN SERPL ELPH-MCNC: 4.7 G/DL
ALP BLD-CCNC: 70 U/L
ALT SERPL-CCNC: 47 U/L
ANION GAP SERPL CALC-SCNC: 12 MMOL/L
APPEARANCE: CLEAR
AST SERPL-CCNC: 22 U/L
BASOPHILS # BLD AUTO: 0.05 K/UL
BASOPHILS NFR BLD AUTO: 0.5 %
BILIRUB SERPL-MCNC: 0.3 MG/DL
BILIRUBIN URINE: NEGATIVE
BIOMARKER COMMENT: NORMAL
BLOOD URINE: NEGATIVE
BUN SERPL-MCNC: 9 MG/DL
CALCIUM SERPL-MCNC: 10.2 MG/DL
CHLORIDE SERPL-SCNC: 98 MMOL/L
CHOLEST SERPL-MCNC: 219 MG/DL
CO2 SERPL-SCNC: 27 MMOL/L
COLOR: NORMAL
CREAT SERPL-MCNC: 0.96 MG/DL
CRP SERPL-MCNC: 5 MG/L
CRP SERPL-MCNC: 5.3 MG/L
EGF SERPL-MCNC: 160 PG/ML
EGFR: 102 ML/MIN/1.73M2
EOSINOPHIL # BLD AUTO: 0.2 K/UL
EOSINOPHIL NFR BLD AUTO: 2.2 %
ERYTHROCYTE [SEDIMENTATION RATE] IN BLOOD BY WESTERGREN METHOD: 46 MM/HR
ESTIMATED AVERAGE GLUCOSE: 128 MG/DL
FOOTNOTE: NORMAL
GLUCOSE QUALITATIVE U: NEGATIVE
GLUCOSE SERPL-MCNC: 106 MG/DL
HAV IGM SER QL: NONREACTIVE
HBA1C MFR BLD HPLC: 6.1 %
HBV CORE IGM SER QL: NONREACTIVE
HBV SURFACE AG SER QL: NONREACTIVE
HCT VFR BLD CALC: 41.4 %
HCV AB SER QL: NONREACTIVE
HCV S/CO RATIO: 0.05 S/CO
HDLC SERPL-MCNC: 54 MG/DL
HGB BLD-MCNC: 13.5 G/DL
IL6 SERPL-MCNC: 8.8 PG/ML
IMM GRANULOCYTES NFR BLD AUTO: 0.7 %
KETONES URINE: NEGATIVE
LDLC SERPL CALC-MCNC: 145 MG/DL
LEPTIN SERPL-MCNC: 23 NG/ML
LEUKOCYTE ESTERASE URINE: NEGATIVE
LYMPHOCYTES # BLD AUTO: 1.64 K/UL
LYMPHOCYTES NFR BLD AUTO: 17.9 %
Lab: NORMAL
Lab: NORMAL
M TB IFN-G BLD-IMP: NEGATIVE
MAN DIFF?: NORMAL
MCHC RBC-ENTMCNC: 28.1 PG
MCHC RBC-ENTMCNC: 32.6 GM/DL
MCV RBC AUTO: 86.1 FL
MMP-1 SERPL-MCNC: 2.2 NG/ML
MMP-3 SERPL-MCNC: 190 NG/ML
MONOCYTES # BLD AUTO: 0.6 K/UL
MONOCYTES NFR BLD AUTO: 6.6 %
NEUTROPHILS # BLD AUTO: 6.61 K/UL
NEUTROPHILS NFR BLD AUTO: 72.1 %
NITRITE URINE: NEGATIVE
NONHDLC SERPL-MCNC: 165 MG/DL
PH URINE: 6.5
PLATELET # BLD AUTO: 326 K/UL
POTASSIUM SERPL-SCNC: 4.4 MMOL/L
PROT SERPL-MCNC: 7.5 G/DL
PROTEIN URINE: NEGATIVE
QUANTIFERON TB PLUS MITOGEN MINUS NIL: 3.7 IU/ML
QUANTIFERON TB PLUS NIL: 0.04 IU/ML
QUANTIFERON TB PLUS TB1 MINUS NIL: 0 IU/ML
QUANTIFERON TB PLUS TB2 MINUS NIL: 0 IU/ML
RA DAS LEVEL QL VECTRADA: NORMAL
RBC # BLD: 4.81 M/UL
RBC # FLD: 14.4 %
RESISTIN SERPL-MCNC: 8 NG/ML
RISK OF RADIOGRAPHIC PROGRESS: 5 %
SODIUM SERPL-SCNC: 136 MMOL/L
SPECIFIC GRAVITY URINE: 1.02
TESTOST FREE SERPL-MCNC: 4.8 PG/ML
TESTOST SERPL-MCNC: 280 NG/DL
TNFRSF1A SERPL-MCNC: 0.78 NG/ML
TRIGL SERPL-MCNC: 100 MG/DL
URATE SERPL-MCNC: 5.2 MG/DL
UROBILINOGEN URINE: NORMAL
VAP-1 SERPL-MCNC: 0.78 UG/ML
VECTRA SCORE: 39
VEGF-A SERPL-MCNC: 230 PG/ML
WBC # FLD AUTO: 9.16 K/UL
YKL-40 RESULT: 45 NG/ML

## 2023-02-16 ENCOUNTER — NON-APPOINTMENT (OUTPATIENT)
Age: 43
End: 2023-02-16

## 2023-02-16 NOTE — ASSESSMENT
[FreeTextEntry1] : 41 yo .. 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 slowly improving\par \par 1) Inflammatory arthropathy- seropositive RA + RF 40, CCP > 250, neg HLAB27 and nl SI joints\par Started Humira 10/20 and added MTX with intermittent flares every few months still in monoarticular pattern... recent flare R wrist + response to steroids.. , L foot/ achilles still uncomfortable but tolerable.  \par Continues Humira  wkly with SQ MTX 15 mg ..frustrated with still mild disease activity but doesn’t want to make any changes ... no swelling seen on exam today but describes intermittent flares every few weeks. Advised to call me directly with next event... would like to see.. \par Minimal synovitis on exam today \par still no psoriasis.. though nails have been dystrophic... \par NOTE :\par Initial presentation sudden onset monoarticular pattern new onset 4/20... 1st episode R 1st MTP but also L shoulder (SAB) and R lateral hip- R elbow, L1 MTP- several PIP joints despite 0.6 mg colchicine daily BID and -added SSA 2000 mg daily..always responsive to 40 mg pred x 1-3 days. \par Gout UA 8.8 with + metabolic syndrome possible\par Nail dystrophy possible pitting  No tophi  - unchanged   \par - SSA in past initially tolerated then developed Nausea..and inadequate response \par - FH significant for both Gout and PsA..\par - No personal of psoriasis, AS, inflammatory back, bowel or eye dz\par Denies renal calculi\par Baseline labs 6/20 UA 7.0 nl CMP.  updated UA 8.8. \par \par Discussion\par - caution with MTX given NAFLD with elevated ALT at baseline .. nl now ... 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 crystal deposition. Allopurinol started and titrated to 300 mg now UA 5.5.  Well tolerated.. updated labs needed now \par - HTN: well controlled on treatment \par - overweight BMI 28-> 31 now w/ steroids.-> 28 again, rare use of steroids . \par - NAFLD:  persistently elevated ALT 45-50 -> LFTs fine 1/21 -> 3/21 nl \par - HLD: , , HD 45,  w/ ratio 5.3 -> unchanged 6/22.. needs to d/w PCP\par - HbA1c 5.9  -> 5.8-> 5.5\par - Confirmed WILMER:  now on CPAP but little improvement, struggling with tolerating..working w/ ENT provider \par \par 3) radiculopathy/ myelopathy:  \par Cervical: severe HNP at C 4-5 - 5-6 with LUE paresthesias/ weakness, sx indicated. Better following sx.. paresthesias nearly fully resolved but weak  str persists\par - Updated eval with persistent LUE weakness\par Lumbar:  needs advanced imaging.. with R sided radicular sx foot.. no obvious weakness at this time. \par \par \par Plan: \par - complete labs before next lab\par \par - Administered 30 mg of Toradol in R deltoid....drink a lot of water and refrain from using any other NSAIDs for the rest of the day.. for R foot pain which given lack of inflammation may be radiating from back..\par \par - start PT, needs prrogressive strengthening program.. \par \par - continue to take colchicine as needed\par \par - RTO in 3 months \par \par \par - continue  MTX (methotrexate) Rasuvo 15 mg by SQ injection.\par \par - Folic acid 1-3 mg daily on all the days you don't take MTX.  Start at 1 mg and increase in having any SE. \par \par - Continue w/ intermittent pred to 20 mg .. for flare, track frequency.. \par \par - stop cholchicine: if you have a severe flare- at earliest onset take 2 tabs right away, then 1 tab 1 hour later.\par \par - Continue w/ 300 mg allopurinol life long... TRY NOT to miss doses because that's what triggers a flare.. (usually very well tolerated) \par \par - again if you have a flare ok to use steroids, but try to minimize, wt gain and skin rash due to steroids.. should be better when not needed anymore \par \par - continue w/ Humira wkly for now ... if doing well next visit could consider every other week..  May need to consider change in tx if continues to have intermittent flare \par \par - fatigue: you need to continue to address the poor sleep. Continue to work w/ ENT to adjust ..  \par \par - rto 4m\par

## 2023-02-16 NOTE — PHYSICAL EXAM
[General Appearance - Alert] : alert [General Appearance - In No Acute Distress] : in no acute distress [Sclera] : the sclera and conjunctiva were normal [PERRL With Normal Accommodation] : pupils were equal in size, round, and reactive to light [Extraocular Movements] : extraocular movements were intact [Neck Appearance] : the appearance of the neck was normal [] : no respiratory distress [Auscultation Breath Sounds / Voice Sounds] : lungs were clear to auscultation bilaterally [Heart Rate And Rhythm] : heart rate was normal and rhythm regular [Heart Sounds] : normal S1 and S2 [Heart Sounds Gallop] : no gallops [Murmurs] : no murmurs [Heart Sounds Pericardial Friction Rub] : no pericardial rub [Edema] : there was no peripheral edema [Cervical Lymph Nodes Enlarged Posterior Bilaterally] : posterior cervical [Cervical Lymph Nodes Enlarged Anterior Bilaterally] : anterior cervical [Supraclavicular Lymph Nodes Enlarged Bilaterally] : supraclavicular [No CVA Tenderness] : no ~M costovertebral angle tenderness [No Spinal Tenderness] : no spinal tenderness [Abnormal Walk] : normal gait [Nail Clubbing] : no clubbing  or cyanosis of the fingernails [Musculoskeletal - Swelling] : no joint swelling seen [Motor Tone] : muscle strength and tone were normal [Sensation] : the sensory exam was normal to light touch and pinprick [No Focal Deficits] : no focal deficits [Oriented To Time, Place, And Person] : oriented to person, place, and time [Impaired Insight] : insight and judgment were intact [Affect] : the affect was normal [Respiration, Rhythm And Depth] : normal respiratory rhythm and effort [FreeTextEntry1] : frustrated with ongoing issues

## 2023-02-16 NOTE — PROCEDURE
[Today's Date:] : Date: [unfilled] [Risks] : risks [Benefits] : benefits [Alternatives] : alternatives [Consent Obtained] : written consent was obtained prior to the procedure and is detailed in the patient's record [Patient] : Prior to the start of the procedure a time out was taken and the identity of the patient was confirmed via name and date of birth with the patient. The correct site and the procedure to be performed were confirmed. The correct side was confirmed if applicable. The availability of the correct equipment was verified [Therapeutic] : therapeutic [#1 Site: ______] : #1 site identified in the [unfilled] [Alcohol] : alcohol [25 gauge 5/8  inch] : A 25 gauge 5/8 inch needle was used [Tolerated Well] : the patient tolerated the procedure well [No Complications] : there were no complications [Instructions Given] : handouts/patient instructions were given to patient [Patient Instructed to Call] : patient was instructed to call if redness at site, a decrease in range of motion or an increase in pain is noted after procedure. [de-identified] : 30 mg of Ketorolac Tromethamine  [FreeTextEntry1] : - you were given Toradol 30 mg IM today...  Make sure that you are well hydrated.  \par \par

## 2023-02-16 NOTE — HISTORY OF PRESENT ILLNESS
[FreeTextEntry1] : 1/31/2023\par \par - still flaring intermittently.....reports a couple of flares involving, hands, hips, and back....presenting in office currently w/ recent onset of pain - severe  in R foot (no swelling yet)\par - notes he flares w/activity, so he tries to reduces exertional activity, struggling to do anything physical \par - most recent severe flare was in 11/2022.....he presented in office for an emergency visit due to pain and inflammation in hands....he received injections......was sent home w/ colchicine and prednisone....he used them until the flare ended\par - currently experiences paresthesia in his R foot (new and in middle of w/u- MR LS pending) and weakness in LUE (chronic severe neck issues)\par \par - only uses colchicine and prednisone as needed for flares.....in the past 3 months he has taken colchicine on 3-4  different occasions (takes two tablets and another tablet after a hour)\par - last missed Humira and MTX when he had COVID.....has been taking them as prescribed since his recovery, for fear of severe flare \par - denies any rashes \par \par - Medication\par - allopurinol 300 mg\par - folic acid\par - Humira once a week\par - Rasuvo once a week 15 mg\par - colchicine as needed\par all well tolerated with no SE/ Tox\par \par \par \par 1) Pauciarthropathy + CCP high titer:  \par 2) Metabolic syndrome:  elevated Lipids, HbA1c and UA 8.8\par 3) Cervical radiculopathy s/p laminectomy\par 4) Gout\par ___________________________________________________________________________-\par \par \par (Initial HPI 6/23/20) \par 38 yo .. w/ new onset severe pain and swelling in R 1st MTP recurrently in past month.  \par Immediate and nearly full resolution with steroids.  Started on Colchicine once daily, but pain/ swelling returned, another course of steroids needed.  Initial episode 1 m ago... \par Hx in past 2 months sudden onset L shoulder pain/ stiffness spontaneous onset and resolution within 1 wks- MRI + subacromial bursitis.. \par Similar sudden onset R lateral hip pain severe for several days\par ROS otherwise denies inflammatory eye, back, bowel or rash. Denies renal calculi, tophi \par nail deformities.  No constitutional sx and Wt stable for past 15 lbs\par .  \par FH significant for:  \par - gout + FH father and MGM.. \par - Psoriasis:  brother, 1st cousin \par - T2DM both parents \par \par PMH:  \par HTN mild, well controlled \par On HCTZ/ lisinopril for HTN well controlled \par

## 2023-02-16 NOTE — REVIEW OF SYSTEMS
[Joint Pain] : joint pain [Joint Swelling] : joint swelling [Joint Stiffness] : joint stiffness [Sleep Disturbances] : sleep disturbances [Negative] : Heme/Lymph [As Noted in HPI] : as noted in HPI [Heart Rate Is Fast] : the heart rate was not fast [FreeTextEntry2] : wt gain 20 lbs 190-> 208-> 205->190; no formal exercise regimen [FreeTextEntry3] : no hx inflammatory eye dz  [FreeTextEntry5] : better no further tachycardia [FreeTextEntry7] : no sx of inflammatory bowel dz [FreeTextEntry9] : recurrent sudden pauciarticular improved dramatically but in migratory pattern [FreeTextEntry8] : denies previous STD  [de-identified] : ? nail abn and possible rash on R shin months ago- nothing now  [de-identified] : mild weakness in several digits of hands L 3-4 - str..improving...  with paresthesias "strange" sensation into entire hand and now R foot  [de-identified] : WILMER confirmed, starting CPAP

## 2023-02-16 NOTE — CONSULT LETTER
[Dear  ___] : Dear  [unfilled], [Courtesy Letter:] : I had the pleasure of seeing your patient, [unfilled], in my office today. [Referral Closing:] : Thank you very much for seeing this patient.  If you have any questions, please do not hesitate to contact me. [Sincerely,] : Sincerely, [FreeTextEntry2] : Praasd Avila MD  [FreeTextEntry1] : See below for summary of recent rheum eval: \par \par 42 yo .. 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 stopped sSA 12/20.. inadequate response.  \par Eventually added MTX 10 mg- 15 mg wkly (can't increase further 2/2 elevated LFTs) changed to SQ... fairly well controlled now but continued to have intermittent asymmetrical inflammatory episodes.. though less often and quickly responsive to steroids 20-40 mg 1-3 days.. 1-2 x/ mnth.  \par Increased Humira to wkly with SQ MTX..but still intermittent severe arthropathy... imaging + for gouty deposits in feet.. started Allopurinol (see below)\par Only residual arthropathy L ankle/ achilles fullness medially.. given 80 mg kenalog.. call with response.  Will not lower MTX given ongoing inflammation (UECT 1/22 no comment on any inflammation in ankle) obvious warmth/ ttt noted on exam today.  \par still no psoriasis.. though nails have been dystrophic... \par NOTE :\par Initial presentation sudden onset monoarticular pattern new onset 4/20... 1st episode R 1st MTP but also L shoulder (SAB) and R lateral hip- R elbow, L1 MTP- several PIP joints despite 0.6 mg colchicine daily BID and -added SSA 2000 mg daily..always responsive to 40 mg pred x 1-3 days. \par Gout UA 8.8 with + metabolic syndrome possible\par Nail dystrophy possible pitting  No tophi  - unchanged   \par - SSA in past initially tolerated then developed Nausea..\par - FH significant for both Gout and PsA..\par - No personal of psoriasis, AS, inflammatory back, bowel or eye dz\par Denies renal calculi\par Baseline labs 6/20 UA 7.0 nl CMP.  updated UA 8.8. \par \par DIscussion:  \par unclear if this is gout or PsA sin psoriasis or RA (presentation SpA but labs support seropositive RA and no psoriasis)\par - caution with MTX given NAFLD with elevated ALT at baseline .. nl now ... will need to continue to monitor closely now on higher dose given NAFLD \par Given ongoing flares with MTX/ and wkly Humira.. will now treat for gout.. 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 crystal deposition. Allopurinol started and titrated to 300 mg now UA 5.5.  Well tolerated.. \par - HTN: well controlled on treatment \par - overweight BMI 28-> 31 now w/ steroids.. \par - NAFLD:  persistently elevated ALT 45-50 -> LFTs fine 1/21 -> 3/21 nl \par - HLD: , , HD 45,  w/ ratio 5.3 -> unchanged 6/22.. needs to d/w PCP\par - HbA1c 5.9  -> 5.8\par - Confirmed WILMER:  now on CPAP but little improvement, struggling with tolerating..working w/ ENT provider \par \par 3) Cervical radiculopathy:  severe HNP at C 4-5 - 5-6 with LUE paresthesias/ weakness, sx indicated. Better following sx.. paresthesias nearly fully resolved \par \par \par Plan: \par - continue  MTX (methotrexate) Rasuvo 15 mg by SQ injection.\par \par - Folic acid 1-3 mg daily on all the days you don't take MTX.  Start at 1 mg and increase in having any SE. \par \par - Continue w/ intermittent pred to 20 mg .. for flare, track frequency.. \par \par - stop cholchicine: if you have a severe flare- at earliest onset take 2 tabs right away, then 1 tab 1 hour later.\par \par - Continue w/ 300 mg allopurinol life long... TRY NOT to miss doses because that's what triggers a flare.. (usually very well tolerated) \par \par - again if you have a flare ok to use steroids, but try to minimize, wt gain and skin rash due to steroids.. should be better when not needed anymore \par \par - continue w/ Humira wkly for now ... if doing well next visit could consider every other week..  \par \par - fatigue: you need to continue to address the poor sleep. Continue to work w/ ENT to adjust ..  \par \par - given IM injection 80 mg kenalog today.. R deltoid, call in 2 days with update  \par \par - rto 4m\par  [FreeTextEntry3] : Angie Leslie DNP, ANP-C\par Division or Rheumatology\par [facility]\par \par

## 2023-02-17 ENCOUNTER — NON-APPOINTMENT (OUTPATIENT)
Age: 43
End: 2023-02-17

## 2023-03-07 ENCOUNTER — APPOINTMENT (OUTPATIENT)
Dept: RHEUMATOLOGY | Facility: CLINIC | Age: 43
End: 2023-03-07
Payer: COMMERCIAL

## 2023-03-07 VITALS
BODY MASS INDEX: 29.55 KG/M2 | WEIGHT: 195 LBS | DIASTOLIC BLOOD PRESSURE: 80 MMHG | HEART RATE: 81 BPM | OXYGEN SATURATION: 97 % | SYSTOLIC BLOOD PRESSURE: 120 MMHG | HEIGHT: 68 IN | TEMPERATURE: 98.3 F

## 2023-03-07 DIAGNOSIS — R73.9 HYPERGLYCEMIA, UNSPECIFIED: ICD-10-CM

## 2023-03-07 DIAGNOSIS — M50.90 CERVICAL DISC DISORDER, UNSPECIFIED, UNSPECIFIED CERVICAL REGION: ICD-10-CM

## 2023-03-07 DIAGNOSIS — R07.0 PAIN IN THROAT: ICD-10-CM

## 2023-03-07 PROCEDURE — 99215 OFFICE O/P EST HI 40 MIN: CPT | Mod: 25

## 2023-03-07 PROCEDURE — 96372 THER/PROPH/DIAG INJ SC/IM: CPT

## 2023-03-07 RX ORDER — KETOROLAC TROMETHAMINE 30 MG/ML
30 INJECTION, SOLUTION INTRAMUSCULAR; INTRAVENOUS
Qty: 1 | Refills: 0 | Status: COMPLETED | OUTPATIENT
Start: 2023-03-07

## 2023-03-07 RX ORDER — ADALIMUMAB 40MG/0.4ML
40 KIT SUBCUTANEOUS
Qty: 12 | Refills: 3 | Status: DISCONTINUED | COMMUNITY
Start: 2020-10-08 | End: 2023-03-07

## 2023-03-07 RX ADMIN — KETOROLAC TROMETHAMINE 0 MG/ML: 30 INJECTION, SOLUTION INTRAMUSCULAR; INTRAVENOUS at 00:00

## 2023-03-07 NOTE — ASSESSMENT
[FreeTextEntry1] : 41 yo .. 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 slowly improving\par \par 1) Inflammatory arthropathy- seropositive RA + RF 40, CCP > 250, neg HLAB27 and nl SI joints.  \par Continues Humira wkly with SQ MTX 15 mg. Missed Rasuvo dose last week and now in acute flare to Right wrist with enthesitis. Took 40mg of prednisone with positive response, rating pain 4/10 today, improved from yesterday. \par -Summary: Started Humira 10/20 and added MTX with intermittent flares every few months still in monoarticular pattern... L foot/ achilles still uncomfortable but tolerable...still no psoriasis, one patch years ago.. though nails have been dystrophic... Joints involved elbows, SAB, achilles, and intermittent wrists and hands\par -Elevated UA with +DECT started on allopurinol with normal UA now, continues to have intermittent episodes, never in a symmetrical pattern\par NOTE :\par Gout UA 8.8 with + metabolic syndrome possible --> Gout now well controlled with UA 5.2\par Nail dystrophy possible pitting  No tophi  - unchanged   \par - SSA in past initially tolerated then developed Nausea..and inadequate response \par - FH significant for both Gout and PsA..\par - No personal of psoriasis, AS, inflammatory back, bowel or eye dz\par Denies renal calculi\par \par \par Discussion\par -Patient frustrated with flares and acute illnesses. \par -Due to migratory pauciarticular asymmetrical inflammatory arthritis need to treat for both RA and PsA. Patient not a good candidate for BOONE inhibitiors due to metabolic profile (Elevated A1C, HLD, HTN, Overweight) \par -Orencia is best option for RA and suspected PsA\par -Would consider Actemera for RA but not approved for PsA\par - caution with MTX given NAFLD with elevated ALT at baseline, still elevated, will need to continue to monitor closely now on higher dose MTX and many other medications given NAFLD\par \par 2)  metabolic syndrome:   needs to review with PCP, still concerned about persistently elevated LDL and UA along w/ mildly elevated HbA1c\par - GOUTY arthropathy: UA 8.8 baseline confirmed on DECT.... crystal deposition. Allopurinol started and titrated to 300 mg now UA 5.2.  Well tolerated\par - HTN: well controlled on treatment /80\par - overweight BMI 28-> 31 now w/ steroids.-> 28 again, rare use of steroids \par - NAFLD:  persistently elevated ALT 45-50 -> LFTs fine 1/21 -> 3/21 nl --> 1/23 ALT 47 \par - HLD: 1/23 , , HD 54,  -> slightly improved from prior.. needs to d/w PCP\par - HbA1c 5.9  -> 5.8-> 5.5 -->6.1 \par - Confirmed WILMER:  now on CPAP but little improvement, struggling with tolerating..working w/ ENT provider \par \par 3) radiculopathy/ myelopathy:  \par Cervical: severe HNP at C 4-5 - 5-6 with LUE paresthesias/ weakness, sx indicated. Better following sx.. paresthesias nearly fully resolved but weak  str persists\par - Updated eval with persistent LUE weakness\par Lumbar:  needs advanced imaging.. with R sided radicular sx foot.. no obvious weakness at this time. \par \par \par Plan: \par -Would like to start Orencia sq weekly, Reviewed R/ B/SE and understands, will call if any issues or concerns, literature provided.\par \par -Again instructed to hold Humira for acute infections despite repeated requests \par \par -Continue Humira until Orencia is approved \par \par -complete labs before next visit \par \par -Talk to ENT about montelukast \par \par - Administered 30 mg of Toradol in R deltoid for R wrist pain and immediate relief ...drink a lot of water and refrain from using any other NSAIDs for the rest of the day.. \par  \par \par - continue to take colchicine as needed but UA is well below normal \par \par \par - continue  MTX (methotrexate) Rasuvo 15 mg by SQ injection.\par \par - Folic acid 1-3 mg daily on all the days you don't take MTX.  Start at 1 mg and increase in having any SE. \par \par - Continue w/ intermittent pred to 20 mg .. for flare, track frequency... no more than 20mg at a time unless it is a GOUT flare - call first \par \par - Continue w/ 300 mg allopurinol life long... TRY NOT to miss doses because that's what triggers a flare.. (usually very well tolerated) \par \par - again if you have a flare ok to use steroids, but try to minimize, wt gain and skin rash due to steroids.. should be better when not needed anymore \par \par - fatigue: you need to continue to address the poor sleep. Continue to work w/ ENT to adjust ..  \par \par -- RTO in 3 months \par \par

## 2023-03-07 NOTE — PROCEDURE
[Today's Date:] : Date: [unfilled] [Patient] : the patient [Risks] : risks [Benefits] : benefits [Alternatives] : alternatives [Consent Obtained] : written consent was obtained prior to the procedure and is detailed in the patient's record [Therapeutic] : therapeutic [#1 Site: ______] : #1 site identified in the [unfilled] [Alcohol] : alcohol [25 gauge 5/8  inch] : A 25 gauge 5/8 inch needle was used [Tolerated Well] : the patient tolerated the procedure well [No Complications] : there were no complications [Instructions Given] : handouts/patient instructions were given to patient [Patient Instructed to Call] : patient was instructed to call if redness at site, a decrease in range of motion or an increase in pain is noted after procedure. [de-identified] : Toradol 30mg  [FreeTextEntry1] : - you were given Toradol 30 mg IM today... Make sure that you are well hydrated and avoid any other NSAIDs today.  \par \par

## 2023-03-07 NOTE — HISTORY OF PRESENT ILLNESS
[FreeTextEntry1] : 3/7/2023:\par Acute R wrist pain:\par First noticed it Sunday night, took 40mg prednisone at that time. Woke up on Monday and could barely move his R hand/R wrist lateral aspect. Rates pain at that time 6/10 - did not feel like Prednisone was helpful. Feels like R wrist was swollen yesterday, warm to touch compared to L hand. Now rating pain 3/10, tolerable. Of note, did miss one Rasuvo dose last week, didn't get the dose delivered on time but otherwise compliant and hasn't missed doses. This is his first flare since January. Denies any changes to his diet or increased alcohol use. \par \par Otherwise endorses some generalized stiffness in joints in the morning that lasts 5-10 minutes. Rates his RA pain/stiffness at a 4/10 which is tolerable. Tolerating Humira, taking it every week and has not missed a dose. Of note, since December has had two sinus infections and one pharyngitis infection, all treated with antibiotics. Denies strep throat, COVID/Flu infections. Has not held his Humira when has been sick, was told by other Provider to not hold Humira, even when sick. Following up with ENT next week about possible sinus surgery. \par \par Other ROS: denies rashes, denies fever/chills, eye pain/blurry vision, SOB/cough, abd pain and diarrhea. \par \par NOTE:\par Humira x 2 years, overall pain and stiffness improved but getting chronic infections (sinus/throat)\par Rasuvo 15mg/week - tolerating\par Responds better to IM Steroid vs PO steroid \par Feels like he flares with any exertional activity\par Currently experiences paresthesia in his R foot (new and in middle of w/u- MR LS pending) and weakness in LUE (chronic severe neck issues)\par \par \par 1) Pauciarthropathy + CCP high titer:  \par 2) Metabolic syndrome:  elevated Lipids, HbA1c and UA 8.8\par 3) Cervical radiculopathy s/p laminectomy\par 4) Gout\par ___________________________________________________________________________-\par \par \par (Initial HPI 6/23/20) \par 40 yo .. w/ new onset severe pain and swelling in R 1st MTP recurrently in past month.  \par Immediate and nearly full resolution with steroids.  Started on Colchicine once daily, but pain/ swelling returned, another course of steroids needed.  Initial episode 1 m ago... \par Hx in past 2 months sudden onset L shoulder pain/ stiffness spontaneous onset and resolution within 1 wks- MRI + subacromial bursitis.. \par Similar sudden onset R lateral hip pain severe for several days\par ROS otherwise denies inflammatory eye, back, bowel or rash. Denies renal calculi, tophi \par nail deformities.  No constitutional sx and Wt stable for past 15 lbs\par .  \par FH significant for:  \par - gout + FH father and MGM.. \par - Psoriasis:  brother, 1st cousin \par - T2DM both parents \par \par PMH:  \par HTN mild, well controlled \par On HCTZ/ lisinopril for HTN well controlled \par

## 2023-03-07 NOTE — PHYSICAL EXAM
[General Appearance - Alert] : alert [General Appearance - In No Acute Distress] : in no acute distress [Sclera] : the sclera and conjunctiva were normal [Neck Appearance] : the appearance of the neck was normal [] : no respiratory distress [Respiration, Rhythm And Depth] : normal respiratory rhythm and effort [Auscultation Breath Sounds / Voice Sounds] : lungs were clear to auscultation bilaterally [Heart Rate And Rhythm] : heart rate was normal and rhythm regular [Heart Sounds] : normal S1 and S2 [Heart Sounds Gallop] : no gallops [Murmurs] : no murmurs [Edema] : there was no peripheral edema [Cervical Lymph Nodes Enlarged Posterior Bilaterally] : posterior cervical [Cervical Lymph Nodes Enlarged Anterior Bilaterally] : anterior cervical [Supraclavicular Lymph Nodes Enlarged Bilaterally] : supraclavicular [No CVA Tenderness] : no ~M costovertebral angle tenderness [No Spinal Tenderness] : no spinal tenderness [Abnormal Walk] : normal gait [Nail Clubbing] : no clubbing  or cyanosis of the fingernails [Motor Tone] : muscle strength and tone were normal [Sensation] : the sensory exam was normal to light touch and pinprick [No Focal Deficits] : no focal deficits [Oriented To Time, Place, And Person] : oriented to person, place, and time [Impaired Insight] : insight and judgment were intact [Affect] : the affect was normal [Outer Ear] : the ears and nose were normal in appearance [Both Tympanic Membranes Were Examined] : both tympanic membranes were normal [Thyroid Diffuse Enlargement] : the thyroid was not enlarged [FreeTextEntry1] : frustrated with ongoing issues

## 2023-03-21 ENCOUNTER — APPOINTMENT (OUTPATIENT)
Dept: RHEUMATOLOGY | Facility: CLINIC | Age: 43
End: 2023-03-21
Payer: COMMERCIAL

## 2023-03-21 PROCEDURE — 99443: CPT

## 2023-03-21 RX ORDER — ABATACEPT 125 MG/ML
125 INJECTION, SOLUTION SUBCUTANEOUS
Qty: 3 | Refills: 3 | Status: DISCONTINUED | COMMUNITY
Start: 2023-03-07 | End: 2023-03-21

## 2023-05-01 ENCOUNTER — NON-APPOINTMENT (OUTPATIENT)
Age: 43
End: 2023-05-01

## 2023-05-03 LAB
ALBUMIN SERPL ELPH-MCNC: 4.7 G/DL
ALP BLD-CCNC: 61 U/L
ALT SERPL-CCNC: 77 U/L
ANION GAP SERPL CALC-SCNC: 12 MMOL/L
APPEARANCE: CLEAR
AST SERPL-CCNC: 26 U/L
BASOPHILS # BLD AUTO: 0.04 K/UL
BASOPHILS NFR BLD AUTO: 0.5 %
BILIRUB SERPL-MCNC: 0.6 MG/DL
BILIRUBIN URINE: NEGATIVE
BLOOD URINE: NEGATIVE
BUN SERPL-MCNC: 15 MG/DL
C3 SERPL-MCNC: 102 MG/DL
C4 SERPL-MCNC: 14 MG/DL
CALCIUM SERPL-MCNC: 9.6 MG/DL
CHLORIDE SERPL-SCNC: 103 MMOL/L
CO2 SERPL-SCNC: 26 MMOL/L
COLOR: YELLOW
CREAT SERPL-MCNC: 0.98 MG/DL
CRP SERPL-MCNC: <3 MG/L
EGFR: 100 ML/MIN/1.73M2
EOSINOPHIL # BLD AUTO: 0.09 K/UL
EOSINOPHIL NFR BLD AUTO: 1.2 %
GLUCOSE QUALITATIVE U: NEGATIVE MG/DL
GLUCOSE SERPL-MCNC: 88 MG/DL
HCT VFR BLD CALC: 42.3 %
HGB BLD-MCNC: 13.6 G/DL
IMM GRANULOCYTES NFR BLD AUTO: 0.4 %
KETONES URINE: NEGATIVE MG/DL
LEUKOCYTE ESTERASE URINE: NEGATIVE
LYMPHOCYTES # BLD AUTO: 3.8 K/UL
LYMPHOCYTES NFR BLD AUTO: 52.1 %
MAN DIFF?: NORMAL
MCHC RBC-ENTMCNC: 27.8 PG
MCHC RBC-ENTMCNC: 32.2 GM/DL
MCV RBC AUTO: 86.5 FL
MONOCYTES # BLD AUTO: 0.93 K/UL
MONOCYTES NFR BLD AUTO: 12.7 %
NEUTROPHILS # BLD AUTO: 2.41 K/UL
NEUTROPHILS NFR BLD AUTO: 33.1 %
NITRITE URINE: NEGATIVE
PH URINE: 6.5
PLATELET # BLD AUTO: 272 K/UL
POTASSIUM SERPL-SCNC: 3.9 MMOL/L
PROT SERPL-MCNC: 6.8 G/DL
PROTEIN URINE: NEGATIVE MG/DL
RBC # BLD: 4.89 M/UL
RBC # FLD: 14.3 %
SODIUM SERPL-SCNC: 141 MMOL/L
SPECIFIC GRAVITY URINE: 1.02
URATE SERPL-MCNC: 5.2 MG/DL
UROBILINOGEN URINE: 1 MG/DL
WBC # FLD AUTO: 7.3 K/UL

## 2023-05-09 LAB
DSDNA AB SER-ACNC: <12 IU/ML
ERYTHROCYTE [SEDIMENTATION RATE] IN BLOOD BY WESTERGREN METHOD: 5 MM/HR

## 2023-05-11 ENCOUNTER — APPOINTMENT (OUTPATIENT)
Dept: RHEUMATOLOGY | Facility: CLINIC | Age: 43
End: 2023-05-11
Payer: COMMERCIAL

## 2023-05-11 VITALS
HEART RATE: 90 BPM | TEMPERATURE: 98.1 F | OXYGEN SATURATION: 97 % | BODY MASS INDEX: 29.55 KG/M2 | HEIGHT: 68 IN | WEIGHT: 195 LBS | DIASTOLIC BLOOD PRESSURE: 60 MMHG | SYSTOLIC BLOOD PRESSURE: 130 MMHG

## 2023-05-11 DIAGNOSIS — J32.9 CHRONIC SINUSITIS, UNSPECIFIED: ICD-10-CM

## 2023-05-11 DIAGNOSIS — H04.123 DRY EYE SYNDROME OF BILATERAL LACRIMAL GLANDS: ICD-10-CM

## 2023-05-11 PROCEDURE — 99214 OFFICE O/P EST MOD 30 MIN: CPT

## 2023-05-11 RX ORDER — KETOROLAC TROMETHAMINE 30 MG/ML
30 INJECTION, SOLUTION INTRAMUSCULAR; INTRAVENOUS
Qty: 1 | Refills: 0 | Status: DISCONTINUED | COMMUNITY
Start: 2023-01-31 | End: 2023-05-11

## 2023-05-11 RX ORDER — AMOXICILLIN AND CLAVULANATE POTASSIUM 875; 125 MG/1; MG/1
875-125 TABLET, COATED ORAL
Qty: 20 | Refills: 0 | Status: DISCONTINUED | COMMUNITY
Start: 2022-10-21 | End: 2023-05-11

## 2023-05-11 RX ORDER — AMOXICILLIN AND CLAVULANATE POTASSIUM 875; 125 MG/1; MG/1
875-125 TABLET, COATED ORAL
Qty: 20 | Refills: 0 | Status: DISCONTINUED | COMMUNITY
Start: 2022-12-13 | End: 2023-05-11

## 2023-05-11 NOTE — DATA REVIEWED
[FreeTextEntry1] : Labs: \par 6/20 RF 28, CCP > 250, ALT 59 (known NAFLD), UA 8.8, nl CRP, UA, ESR 20. Neg HLAB27, Covid\par HbA1c 5.9- Uric acid 8.8 \par , HD 45, , \par \par Diagnostics: \par MRI L shoulder 6/20 no evidence of RTC pathology and nl GH/ AC joint w/ no effusions. \par \par MRI L shoulder 1/17/2023: degenerative changes at AC joint , no effusions or erosions, RT cuffs intact w/o tears \par MRI Thoracic 1/17/2023: multilevel intervertebral disc dessication. 3mm central disc protrusion at T4-T5. No neural foraminal narrowing, no spinal canal stenosis \par \par MRI Lumbar Spine 1/17/2023: \par L3-L4 3mm broad based left foraminal zone disc protrusion. Left neural foraminal narrowing. No spinal canal stenosis\par L4-L5: 3mm circumferential disc bulge. bl neural foraminal narrowing. No spinal canal stenosis. Bl facet aropathy\par L5-S1: bl facet joint arthrosis\par

## 2023-05-11 NOTE — ASSESSMENT
[FreeTextEntry1] : 41 yo .. 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 slowly improving\par \par 1) Inflammatory arthropathy- seropositive RA + RF 40, CCP > 250, neg HLAB27 and nl SI joints, NO erosions \par Started Actemra, now has had about 3-4 doses. Still flaring and has taken prednisone almost every week. Flares in migratory pattern, R thumb, bl wrists, bl hips. Continues on Rasuvo and hasn't missed a dose. Unsure if Actemra is working better than Humira yet..  \par -Possible inflammatory eye disease, endorsing worsening eye pain and new onset dry eyes but denies vision changes, needs to see eye doctor \par -Summary: \par Started Humira 10/20 and added MTX with intermittent flares every few months still in monoarticular pattern... L foot/ achilles still uncomfortable but tolerable...still no psoriasis, one patch years ago.. though nails have been dystrophic... Joints involved elbows, SAB, achilles, and intermittent wrists and hands\par -Stopped Humira 4/2023 and started Actemra due to recurrent sinusitis \par -Elevated UA with +DECT started on allopurinol with normal UA now, believe gout is better controlled now and feels like flares are RA vs PsA\par Gout UA 8.8 with + metabolic syndrome possible --> Gout now well controlled with UA 5.2\par Nail dystrophy possible pitting  No tophi  - unchanged   \par - SSA in past initially tolerated then developed Nausea..and inadequate response \par - FH significant for both Gout and PsA..\par - No personal of, AS, inflammatory back, bowel or eye dz\par Denies renal calculi\par \par \par Discussion\par -Patient frustrated with flares and acute illnesses. \par -Due to migratory pauciarticular asymmetrical inflammatory arthritis need to treat for both RA and PsA. Patient not a good candidate for BOONE inhibitiors due to metabolic profile (Elevated A1C, HLD, HTN, Overweight) \par -Orencia is best option for RA and suspected PsA\par -Would consider Actemera for RA but not approved for PsA\par -Wanted to start Orencia but unclear if it was not approved?? Patient does not want to come in for infusions \par - caution with MTX given NAFLD with elevated ALT at baseline, still elevated, will need to continue to monitor closely now on higher dose MTX and many other medications given NAFLD AST max 69-> nl now and ALT max 156-> now 77 w/ nl bili \par \par 2)  metabolic syndrome:   needs to review with PCP, still concerned about persistently elevated LDL along w/ mildly elevated HbA1c\par - GOUTY arthropathy: UA 8.8 baseline confirmed on DECT.... crystal deposition. Allopurinol started and titrated to 300 mg now UA 5.2.  Well tolerated\par - HTN: well controlled on treatment /80\par - overweight BMI 28-> 31 now w/ steroids.-> 28 -->29, frequent use of steroids \par - NAFLD:  persistently elevated ALT 45-50 -> LFTs fine 1/21 -> 3/21 nl --> 1/23 ALT 47 --> 5/23 ALT 77\par - HLD: 1/23 , , HD 54,  -> slightly improved from prior.. needs to d/w PCP\par - HbA1c 5.9  -> 5.8-> 5.5 -->6.1 \par - Confirmed WILMER:  now on CPAP but little improvement, struggling with tolerating..working w/ ENT provider \par \par 3) radiculopathy/ myelopathy:  \par Cervical: severe HNP at C 4-5 - 5-6 with LUE paresthesias/ weakness, sx indicated. Better following sx.. paresthesias nearly fully resolved but weak  str persists\par - Updated eval with persistent LUE weakness\par LS;  diffuse DJD throughout but no severe nv foraminal / or central stenosis and nothing that matches paresthesias worse on LLE (compression noted R)\par -Had MR T-L spine with mild-moderate pathology - see data. Could benefit from epidurals especially to LS, encouraged patient to follow up about this\par -Not interested at this time in starting gabapentin but could consider in future if needed on a PRN basis \par \par 4) Lifestyle Change/ frustration \par -Denies overt depression or anxiety but endorses frustration with new lifestyle limitations, correction from work, and decreased energy to care and be with family and friends \par -Not willing to start depression medication now, doesn’t want to be on something daily but would be a good candidate for Cymbalta or Wellbutrin if willing to reconsider in future.  \par \par Plan: \par -Continue Actemra for now and evaluate flare response \par \par -instructed to hold Actemra for acute infections and upcoming Sinus surgery 5/24/2023\par \par -Talk to ENT about montelukast \par \par - continue to take colchicine as needed but UA is well below normal on 300 mg allopurinol \par \par - continue  MTX (methotrexate) Rasuvo 15 mg by SQ injection.\par \par - Folic acid 1-3 mg daily on all the days you don't take MTX.  Start at 1 mg and increase in having any SE.\par \par -Follow up about addition of cymbalta or wellbutrin to help with overall mood and pain at next visit  \par \par - Continue w/ intermittent pred to 20 mg .. for flare, track frequency... no more than 20mg at a time unless it is a GOUT flare - call first...gave RN call line to get through to provider if flares occur, is aware to call even if its a weekend \par \par - needs eye exam newly developed dry eyes \par \par - Continue w/ 300 mg allopurinol life long... TRY NOT to miss doses because that's what triggers a flare.. (usually very well tolerated) \par \par - again if you have a flare ok to use steroids, but try to minimize, wt gain and skin rash due to steroids.. should be better when not needed anymore \par \par - fatigue: you need to continue to address the poor sleep. Continue to work w/ ENT to adjust ..  \par \par -- RTO in 2 months \par \par

## 2023-05-11 NOTE — REVIEW OF SYSTEMS
[Nasal Discharge] : nasal discharge [Joint Pain] : joint pain [Joint Swelling] : joint swelling [Joint Stiffness] : joint stiffness [Sleep Disturbances] : sleep disturbances [Negative] : Heme/Lymph [Eye Pain] : eye pain [Dry Eyes] : dryness of the eyes [As Noted in HPI] : as noted in HPI [Arthralgias] : arthralgias [Dry Skin] : dry skin [Fever] : no fever [Chills] : no chills [Recent Weight Gain (___ Lbs)] : no recent weight gain [Recent Weight Loss (___ Lbs)] : no recent weight loss [Eyesight Problems] : no eyesight problems [Discharge From Eyes] : no purulent discharge from the eyes [Earache] : no earache [Nosebleeds] : no nosebleeds [Sore Throat] : no sore throat [Chest Pain] : no chest pain [Palpitations] : no palpitations [Shortness Of Breath] : no shortness of breath [Cough] : no cough [Abdominal Pain] : no abdominal pain [Constipation] : no constipation [Diarrhea] : no diarrhea [Heartburn] : no heartburn [Anxiety] : no anxiety [Depression] : no depression [FreeTextEntry2] : wt gain 20 lbs 190-> 208-> 205->190 -->195 (stable); no formal exercise regimen but active all day [FreeTextEntry3] : at times severe requiring saline gtts routinely  [FreeTextEntry4] : NO spinal ttt  [FreeTextEntry7] : no sx of inflammatory bowel dz [FreeTextEntry8] : denies any rash/psoriasis to groin, denies previous STD  [FreeTextEntry9] : PRIOR: recurrent sudden pauciarticular improved dramatically but in migratory pattern --> still and takes prednisone with relief when flaring  [de-identified] : No rashes, no skin changes, no nail deformities and still Prior: nail abn and possible rash on R shin months ago- nothing now  [de-identified] : WILMER confirmed, on CPAP, denies depression but endorses frustration with how pain affects his life and ability to do things [de-identified] : recurrent infections on humira, less so with Actemra

## 2023-05-11 NOTE — PHYSICAL EXAM
[General Appearance - Alert] : alert [General Appearance - In No Acute Distress] : in no acute distress [Sclera] : the sclera and conjunctiva were normal [Outer Ear] : the ears and nose were normal in appearance [Neck Appearance] : the appearance of the neck was normal [Thyroid Diffuse Enlargement] : the thyroid was not enlarged [Respiration, Rhythm And Depth] : normal respiratory rhythm and effort [Auscultation Breath Sounds / Voice Sounds] : lungs were clear to auscultation bilaterally [Heart Rate And Rhythm] : heart rate was normal and rhythm regular [Heart Sounds] : normal S1 and S2 [Heart Sounds Gallop] : no gallops [Murmurs] : no murmurs [Edema] : there was no peripheral edema [Cervical Lymph Nodes Enlarged Posterior Bilaterally] : posterior cervical [Cervical Lymph Nodes Enlarged Anterior Bilaterally] : anterior cervical [Supraclavicular Lymph Nodes Enlarged Bilaterally] : supraclavicular [No CVA Tenderness] : no ~M costovertebral angle tenderness [Abnormal Walk] : normal gait [Nail Clubbing] : no clubbing  or cyanosis of the fingernails [Motor Tone] : muscle strength and tone were normal [Oriented To Time, Place, And Person] : oriented to person, place, and time [Impaired Insight] : insight and judgment were intact [Affect] : the affect was normal [Musculoskeletal - Swelling] : no joint swelling seen [] : no rash [FreeTextEntry1] : frustrated with ongoing issues, denies depression

## 2023-05-11 NOTE — HISTORY OF PRESENT ILLNESS
[FreeTextEntry1] : 5/11/2023:\par \par -Switched to Actemra, has had 3-4 doses, continued Humira until he received Actemra but overall feels like it hasn't kicked in. Still getting flares. Feels the flares "all over" R thumb, bl wrists, both hips. It's hard for him to pin point. Denies any pain in feet, ankles, or toes. Doesn't feel like it's his gout. Has noticed redness in thumbs. He can't distinguish between RA flares vs gout flares vs PsA Flares. Takes 20mg prednisone for about three days when he feels a flare coming on. Prednisone doesn't help right away but eventually does feel relief within 3 days\par -Feels like he has had 5-6 flares since March.\par -On Rasuvo qweek, compliant and tolerating. \par -Also c/o of dry eyes, feels like he has sand in his eyes, not occurring every day, occurring sporadically. Hasn't seen an eye doctor for this yet.\par -Retired from work, 6m ago due to many medical issues \par -Having surgery on sinuses May 24th (for recurrent URIs- interferring with treatment-leading to flares). Still taking flonase and claritin but no new medications for sinuses. \par \par Denies fever, chills, rash, sob, pleuritic pain, chest pain, palpitations, abdominal pain, n/v/d. Denies MI/CVA, DVT/PE. Denies depression or anxiety but endorses frustration with pain and the effect that this has on his life. \par \par \par NOTE:\par Humira x 2 years, overall pain and stiffness improved but getting chronic infections (sinus/throat)\par Started Actemra 4/2023\par Rasuvo 15mg/week - tolerating\par Responds better to IM Steroid vs PO steroid \par Feels like he flares with any exertional activity\par Currently experiences paresthesia in his R foot (new and in middle of w/u- MR LS reviewed 5/23 w/ diffuse DJD changes and nv entrapment worse on LLE- but RLE is where paresthesia started) and weakness in LUE (chronic severe neck issues despite Cspine ortho sx  )\par \par \par 1) Pauciarthropathy + CCP high titer:  \par 2) Metabolic syndrome:  elevated Lipids, HbA1c and UA 8.8\par 3) Cervical radiculopathy s/p laminectomy\par 4) Gout\par ___________________________________________________________________________-\par \par \par (Initial HPI 6/23/20) \par 38 yo .. w/ new onset severe pain and swelling in R 1st MTP recurrently in past month.  \par Immediate and nearly full resolution with steroids.  Started on Colchicine once daily, but pain/ swelling returned, another course of steroids needed.  Initial episode 1 m ago... \par Hx in past 2 months sudden onset L shoulder pain/ stiffness spontaneous onset and resolution within 1 wks- MRI + subacromial bursitis.. \par Similar sudden onset R lateral hip pain severe for several days\par ROS otherwise denies inflammatory eye, back, bowel or rash. Denies renal calculi, tophi \par nail deformities.  No constitutional sx and Wt stable for past 15 lbs\par .  \par FH significant for:  \par - gout + FH father and MGM.. \par - Psoriasis:  brother, 1st cousin \par - T2DM both parents \par \par PMH:  \par HTN mild, well controlled \par On HCTZ/ lisinopril for HTN well controlled \par

## 2023-05-24 ENCOUNTER — OUTPATIENT (OUTPATIENT)
Dept: INPATIENT UNIT | Facility: HOSPITAL | Age: 43
LOS: 1 days | Discharge: HOME | End: 2023-05-24
Payer: COMMERCIAL

## 2023-05-24 ENCOUNTER — TRANSCRIPTION ENCOUNTER (OUTPATIENT)
Age: 43
End: 2023-05-24

## 2023-05-24 VITALS
SYSTOLIC BLOOD PRESSURE: 134 MMHG | TEMPERATURE: 98 F | DIASTOLIC BLOOD PRESSURE: 96 MMHG | HEART RATE: 98 BPM | RESPIRATION RATE: 18 BRPM | OXYGEN SATURATION: 98 %

## 2023-05-24 VITALS
OXYGEN SATURATION: 97 % | SYSTOLIC BLOOD PRESSURE: 122 MMHG | HEIGHT: 68 IN | WEIGHT: 199.96 LBS | HEART RATE: 68 BPM | RESPIRATION RATE: 15 BRPM | TEMPERATURE: 98 F | DIASTOLIC BLOOD PRESSURE: 63 MMHG

## 2023-05-24 DIAGNOSIS — J32.2 CHRONIC ETHMOIDAL SINUSITIS: ICD-10-CM

## 2023-05-24 DIAGNOSIS — Z98.890 OTHER SPECIFIED POSTPROCEDURAL STATES: Chronic | ICD-10-CM

## 2023-05-24 DIAGNOSIS — J32.0 CHRONIC MAXILLARY SINUSITIS: ICD-10-CM

## 2023-05-24 PROCEDURE — 88300 SURGICAL PATH GROSS: CPT

## 2023-05-24 PROCEDURE — 88304 TISSUE EXAM BY PATHOLOGIST: CPT | Mod: 26

## 2023-05-24 PROCEDURE — 88304 TISSUE EXAM BY PATHOLOGIST: CPT

## 2023-05-24 PROCEDURE — C1889: CPT

## 2023-05-24 PROCEDURE — 88300 SURGICAL PATH GROSS: CPT | Mod: 26,59

## 2023-05-24 PROCEDURE — C2625: CPT

## 2023-05-24 RX ORDER — OXYCODONE HYDROCHLORIDE 5 MG/1
5 TABLET ORAL ONCE
Refills: 0 | Status: DISCONTINUED | OUTPATIENT
Start: 2023-05-24 | End: 2023-05-24

## 2023-05-24 RX ORDER — ALLOPURINOL 300 MG
1 TABLET ORAL
Refills: 0 | DISCHARGE

## 2023-05-24 RX ORDER — HYDROMORPHONE HYDROCHLORIDE 2 MG/ML
0.5 INJECTION INTRAMUSCULAR; INTRAVENOUS; SUBCUTANEOUS
Refills: 0 | Status: DISCONTINUED | OUTPATIENT
Start: 2023-05-24 | End: 2023-05-24

## 2023-05-24 RX ORDER — ACETAMINOPHEN 500 MG
1000 TABLET ORAL ONCE
Refills: 0 | Status: COMPLETED | OUTPATIENT
Start: 2023-05-24 | End: 2023-05-24

## 2023-05-24 RX ORDER — FLUTICASONE PROPIONATE 50 MCG
1 SPRAY, SUSPENSION NASAL
Refills: 0 | DISCHARGE

## 2023-05-24 RX ORDER — FENTANYL CITRATE 50 UG/ML
50 INJECTION INTRAVENOUS
Refills: 0 | Status: DISCONTINUED | OUTPATIENT
Start: 2023-05-24 | End: 2023-05-24

## 2023-05-24 RX ORDER — HYDROCHLOROTHIAZIDE 25 MG
1 TABLET ORAL
Refills: 0 | DISCHARGE

## 2023-05-24 RX ORDER — FOLIC ACID 0.8 MG
1 TABLET ORAL
Refills: 0 | DISCHARGE

## 2023-05-24 RX ORDER — ONDANSETRON 8 MG/1
4 TABLET, FILM COATED ORAL ONCE
Refills: 0 | Status: COMPLETED | OUTPATIENT
Start: 2023-05-24 | End: 2023-05-24

## 2023-05-24 RX ORDER — TOCILIZUMAB 20 MG/ML
0 INJECTION, SOLUTION, CONCENTRATE INTRAVENOUS
Refills: 0 | DISCHARGE

## 2023-05-24 RX ORDER — SODIUM CHLORIDE 9 MG/ML
1000 INJECTION, SOLUTION INTRAVENOUS
Refills: 0 | Status: DISCONTINUED | OUTPATIENT
Start: 2023-05-24 | End: 2023-05-24

## 2023-05-24 RX ADMIN — FENTANYL CITRATE 50 MICROGRAM(S): 50 INJECTION INTRAVENOUS at 18:00

## 2023-05-24 RX ADMIN — OXYCODONE HYDROCHLORIDE 5 MILLIGRAM(S): 5 TABLET ORAL at 17:37

## 2023-05-24 RX ADMIN — SODIUM CHLORIDE 125 MILLILITER(S): 9 INJECTION, SOLUTION INTRAVENOUS at 17:15

## 2023-05-24 RX ADMIN — OXYCODONE HYDROCHLORIDE 5 MILLIGRAM(S): 5 TABLET ORAL at 18:15

## 2023-05-24 RX ADMIN — Medication 400 MILLIGRAM(S): at 17:15

## 2023-05-24 RX ADMIN — ONDANSETRON 4 MILLIGRAM(S): 8 TABLET, FILM COATED ORAL at 17:15

## 2023-05-24 RX ADMIN — FENTANYL CITRATE 50 MICROGRAM(S): 50 INJECTION INTRAVENOUS at 17:37

## 2023-05-24 RX ADMIN — Medication 1000 MILLIGRAM(S): at 18:00

## 2023-05-24 NOTE — BRIEF OPERATIVE NOTE - NSICDXBRIEFPROCEDURE_GEN_ALL_CORE_FT
PROCEDURES:  FESS, with septoplasty, with imaging guidance 24-May-2023 16:59:13  Chaka Bellamy  Coblation turbinate reduction 24-May-2023 16:59:21  Chaka Bellamy

## 2023-05-24 NOTE — ASU DISCHARGE PLAN (ADULT/PEDIATRIC) - NS MD DC FALL RISK RISK
For information on Fall & Injury Prevention, visit: [email or web address] OR  [email or web address] OR  https://www.cdc.gov/steadi/patient.html

## 2023-05-24 NOTE — BRIEF OPERATIVE NOTE - NSICDXBRIEFPOSTOP_GEN_ALL_CORE_FT
POST-OP DIAGNOSIS:  Chronic sinusitis 24-May-2023 17:00:11  Chaka Bellamy  Deviated septum 24-May-2023 17:00:21  Chaka Bellamy  Nasal turbinate hypertrophy 24-May-2023 17:00:26  Chaka Bellamy

## 2023-05-24 NOTE — ASU DISCHARGE PLAN (ADULT/PEDIATRIC) - CARE PROVIDER_API CALL
Chaka Bellamy)  Otolaryngology  [address] [place] [address]  Phone: [phone]  Fax: [phone]  Follow Up Time:

## 2023-05-24 NOTE — BRIEF OPERATIVE NOTE - NSICDXBRIEFPREOP_GEN_ALL_CORE_FT
[facility]   DISCHARGE SUMMARY      Name:  Topher Stoll   Age:  48 y.o.  Sex:  male  :  1972  MRN:  [id number]   Visit Number:  [id number]    Admission Date:  6/15/2021  Date of Discharge:  2021  Primary Care Physician:  Juan Miguel Myers MD    Important issues to note:    1.  Admitted to the hospital with pneumonia with history of COPD needing 5L on admission to maintain oxygen saturations.    2.  Given Rocephin and Doxycycline on admission with improvement as well as nebs and steroids.      3.  Stable for discharge home today.  Supplemental oxygen not needed at discharge.  Continue home oxygen PRN.  Will discharge to complete 5 days of antibiotic therapy with Doxycycline PO and Omnicef, as well as steroid burst Prednisone 40mg for additional 2 days.  Continue home nebulizers.    Discharge Diagnoses:     Active Hospital Problems    Diagnosis  POA   • **Community acquired pneumonia of left lower lobe of lung [J18.9]  Yes   • Anxiety [F41.9]  Yes   • Chronic pain syndrome [G89.4]  Yes   • Gastroesophageal reflux disease [K21.9]  Yes   • Hypertension [I10]  Yes   • Hyperlipidemia [E78.5]  Yes   • Chronic obstructive pulmonary disease with acute exacerbation (CMS/HCC) [J44.1]  Yes   • Pulmonary emphysema (CMS/HCC) [J43.9]  Yes      Resolved Hospital Problems   No resolved problems to display.     Presenting Problem:    Chief Complaint   Patient presents with   • Shortness of Breath      Consults:     Consulting Physician(s)             None          History of presenting illness/Hospital Course:    Patient is a 48 year old male who presented to the Emergency Department today with complaints of shortness of breath, cough worse than baseline, wheezing, and increased home oxygen use for the past 4 days.  Patient states he had to use his home PRN oxygen all day yesterday.  Patient with health history significant for COPD with home PRN oxygen use, GERD, GUILLE on home Cpap, and  former smoker, and history of recreational drug use on Methadone currently.  Fully vaccinated for Covid.  CT of chest noted left lower lobe airspace disease consistent with pneumonia.      Patient was admitted to the hospital and placed on Rocephin IV and Doxycycline PO.  Patient with prior history of GI intolerance to Doxycycline, however, tolerated well with food.  Patient was given IV solumedrol and transitioned to PO Prednisone as well as nebulizers every 4 hours.  Lab work has remained stable and blood cultures are without any growth.  Patient was utilizing 5L of oxygen on admission and was titrated down slowly.  Walking oximetry today revealed no need for supplemental oxygen at discharge.  Patient already utilizes PRN oxygen at home.  Patient is stable for discharge home today with Doxycycline and Omnicef to complete antibiotic therapy for total of 5 days and 2 more days of Prednisone 40mg burst steroid therapy.  Patient will have follow up with PCP on Monday.  Resume home medications and nebulizers at discharge.  Instructed to return to the hospital with increased oxygen usage, increased shortness of breath or chest pain.     Vital Signs:    Temp:  [97.6 °F (36.4 °C)-98.2 °F (36.8 °C)] 98.2 °F (36.8 °C)  Heart Rate:  [64-84] 81  Resp:  [16-18] 18  BP: ()/(56-96) 116/79    Physical Exam:    General Appearance:  Alert and cooperative, resting in bed on exam without any complaints.  Middle aged male.   Head:  Atraumatic and normocephalic.   Eyes: Conjunctivae and sclerae normal, no icterus. No pallor.   Ears:  Ears with no abnormalities noted.   Throat: No oral lesions, no thrush, oral mucosa moist.   Neck: Supple, trachea midline   Back:   No kyphoscoliosis present. No tenderness to palpation.   Lungs:   Breath sounds heard bilaterally equally but diminished, improving rhonchi and wheezing.  Unlabored in conversation.     Heart:  Normal S1 and S2, no murmur, no gallop, no rub. No JVD.   Abdomen:   Normal  bowel sounds, no masses, no organomegaly. Soft, nontender, nondistended, no rebound tenderness.   Extremities: Supple, no edema, no cyanosis, no clubbing.   Pulses: Pulses palpable bilaterally.   Skin: No bleeding or rash.   Neurologic: Alert and oriented x 3. No facial asymmetry. Moves all four limbs. No tremors.     Pertinent Lab Results:     Results from last 7 days   Lab Units 06/18/21  0826 06/17/21  0602 06/16/21  0539 06/15/21  0910   SODIUM mmol/L 136 138 136 138   POTASSIUM mmol/L 4.4 3.9 4.1 4.6   CHLORIDE mmol/L 102 104 102 101   CO2 mmol/L 22.9 28.3 24.0 27.7   BUN mg/dL 18 20 15 16   CREATININE mg/dL 0.80 0.84 0.79 0.98   CALCIUM mg/dL 9.1 9.4 9.3 9.8   BILIRUBIN mg/dL  --   --  0.2 0.3   ALK PHOS U/L  --   --  128* 146*   ALT (SGPT) U/L  --   --  15 18   AST (SGOT) U/L  --   --  19 23   GLUCOSE mg/dL 83 94 139* 121*     Results from last 7 days   Lab Units 06/18/21  0826 06/17/21  0602 06/16/21  0539   WBC 10*3/mm3 10.87* 9.40 9.59   HEMOGLOBIN g/dL 16.0 14.8 14.6   HEMATOCRIT % 49.6 45.9 45.6   PLATELETS 10*3/mm3 208 199 207         Results from last 7 days   Lab Units 06/15/21  0910   TROPONIN T ng/mL <0.010     Results from last 7 days   Lab Units 06/15/21  0910   PROBNP pg/mL 114.2             Results from last 7 days   Lab Units 06/15/21  0958   PH, ARTERIAL pH units 7.352   PO2 ART mm Hg 69.7*   PCO2, ARTERIAL mm Hg 48.6*   HCO3 ART mmol/L 26.9     Results from last 7 days   Lab Units 06/15/21  1737   BLOODCX  No growth at 2 days  No growth at 2 days       Pertinent Radiology Results:    Imaging Results (All)     Procedure Component Value Units Date/Time    CT Chest Pulmonary Embolism [[id number]] Collected: 06/15/21 1109     Updated: 06/15/21 1524    Narrative:      PROCEDURE: CT CHEST PULMONARY EMBOLISM-     HISTORY: soa, hypoxia, elevated d-dimer     COMPARISON: None .     TECHNIQUE: Multiple axial CT images were obtained from the thoracic  inlet through the upper abdomen following the  administration of Isovue  300 per the CT PE protocol. Coronal and oblique 3D MIP images were  reconstructed from the original axial data set.      FINDINGS: There are no filling defects within the pulmonary arteries to  suggest an embolus. The thoracic aorta is normal in caliber with no  evidence of dissection. The heart is normal in size. There are no  pleural or pericardial effusions. There is no adenopathy. There is  patchy left lower lobe airspace disease consistent with pneumonia. The  visualized upper abdomen is unremarkable. Bone windows reveal  degenerative change of the right sternoclavicular joint with a joint  effusion.       Impression:      1. No evidence of pulmonary embolus or dissection.  2. Patchy left lower lobe airspace disease is consistent with pneumonia.  3. Degenerative changes of the right sternoclavicular joint with joint  effusion.           406.21 mGy.cm        This study was performed with techniques to keep radiation doses as low  as reasonably achievable (ALARA). Individualized dose reduction  techniques using automated exposure control or adjustment of mA and/or  kV according to the patient size were employed.               Images were reviewed, interpreted, and dictated by Dr. Megan Lorenzana M.D.  Transcribed by Citlalli Hawk PA-C.     This report was finalized on 6/15/2021 3:22 PM by Megan Lorenzana M.D..    XR Chest 2 View [[id number]] Collected: 06/15/21 0935     Updated: 06/15/21 0938    Narrative:      PROCEDURE: XR CHEST 2 VW-        HISTORY: SOA Triage Protocol     COMPARISON: 03/01/2021.     FINDINGS: The heart is normal in size. The mediastinum is unremarkable.  The lungs are clear. There is no pneumothorax. There are no acute  osseous abnormalities.       Impression:      No acute cardiopulmonary process.           This report was finalized on 6/15/2021 9:35 AM by Megan Lorenzana M.D..        Condition on Discharge:      Stable.    Code status during the  hospital stay:    Code Status and Medical Interventions:   Ordered at: 06/15/21 1528     Level Of Support Discussed With:    Patient     Code Status:    CPR     Medical Interventions (Level of Support Prior to Arrest):    Full     Discharge Disposition:    Home or Self Care    Discharge Medications:       Discharge Medications      New Medications      Instructions Start Date   cefdinir 300 MG capsule  Commonly known as: OMNICEF   300 mg, Oral, 2 Times Daily      doxycycline 100 MG capsule  Commonly known as: MONODOX   Take 1 capsule by mouth Every 12 (Twelve) Hours      predniSONE 20 MG tablet  Commonly known as: DELTASONE   40 mg, Oral, Daily With Breakfast   Start Date: June 20, 2021        Changes to Medications      Instructions Start Date   losartan 100 MG tablet  Commonly known as: COZAAR  What changed:   · how much to take  · when to take this   100 mg, Oral, Daily      ondansetron ODT 4 MG disintegrating tablet  Commonly known as: ZOFRAN-ODT  What changed: Another medication with the same name was removed. Continue taking this medication, and follow the directions you see here.   4 mg, Translingual, 4 Times Daily PRN         Continue These Medications      Instructions Start Date   albuterol (2.5 MG/3ML) 0.083% nebulizer solution  Commonly known as: PROVENTIL   2.5 mg, Nebulization, Every 4 Hours PRN      albuterol sulfate  (90 Base) MCG/ACT inhaler  Commonly known as: Ventolin HFA   2 puffs, Inhalation, Every 4 Hours PRN      Breo Ellipta 200-25 MCG/INH inhaler  Generic drug: Fluticasone Furoate-Vilanterol   1 puff, Inhalation, Daily      esomeprazole 40 MG capsule  Commonly known as: nexIUM   40 mg, Oral, Every Morning Before Breakfast      fluticasone 50 MCG/ACT nasal spray  Commonly known as: FLONASE   1 spray, Nasal, Daily      lovastatin 40 MG tablet  Commonly known as: MEVACOR   40 mg, Oral, Nightly      methadone 5 MG/5ML solution  Commonly known as: DOLOPHINE   60 mg, Oral, Daily, Patient  takes 60 mg once per day in mornings, states took a dose 6/15/21 around 0700      montelukast 10 MG tablet  Commonly known as: SINGULAIR   10 mg, Oral, Every Evening      naproxen sodium 220 MG tablet  Commonly known as: ALEVE   440 mg, Oral, As Needed      Spiriva Respimat 1.25 MCG/ACT aerosol solution inhaler  Generic drug: Tiotropium Bromide Monohydrate   2 puffs, Inhalation, Daily      traZODone 150 MG tablet  Commonly known as: DESYREL   150 mg, Oral, Nightly PRN         Stop These Medications    promethazine 25 MG tablet  Commonly known as: PHENERGAN          Discharge Diet:     Diet Instructions     Diet: Regular      Discharge Diet: Regular        Activity at Discharge:     Activity Instructions     Activity as Tolerated      Gradually Increase Activity Until at Pre-Hospitalization Level          Follow-up Appointments:     Contact information for follow-up providers     Juan Miguel Myers MD Follow up.    Specialty: Family Medicine  Why: Please schedule your follow appointment in the next week.  Contact information:  [facility] [place] [address] [place] [address] [phone]                   Contact information for after-discharge care     Durable Medical Equipment     PATIENT [facility] .    Service: Durable Medical Equipment  Contact information:  [address] [place] [address] [phone]                           Future Appointments   Date Time Provider Department Center   10/28/2021 10:30 AM Melina Olivas MD [facility]     Test Results Pending at Discharge:    Pending Labs     Order Current Status    Blood Culture - Blood, Arm, Left Preliminary result    Blood Culture - Blood, Arm, Left Preliminary result             Destiny Nichole, LISA  06/18/21  13:55 EDT    Time: I spent 28 minutes on this discharge activity which included: face-to-face encounter with the patient, reviewing the data in the system, coordination of the care with the nursing staff as well as consultants,  documentation, and entering orders.     Dictated utilizing Dragon dictation.     PRE-OP DIAGNOSIS:  Chronic maxillary sinusitis 24-May-2023 16:59:38  Chaka Bellamy  Chronic ethmoidal sinusitis 24-May-2023 16:59:48  Chaka Bellamy  Deviated septum 24-May-2023 16:59:54  Chaka Bellamy  Nasal turbinate hypertrophy 24-May-2023 17:00:00  Chaka Bellamy

## 2023-05-31 LAB — SURGICAL PATHOLOGY STUDY: SIGNIFICANT CHANGE UP

## 2023-06-01 DIAGNOSIS — M10.9 GOUT, UNSPECIFIED: ICD-10-CM

## 2023-06-01 DIAGNOSIS — I10 ESSENTIAL (PRIMARY) HYPERTENSION: ICD-10-CM

## 2023-06-01 DIAGNOSIS — J34.3 HYPERTROPHY OF NASAL TURBINATES: ICD-10-CM

## 2023-06-01 DIAGNOSIS — J32.9 CHRONIC SINUSITIS, UNSPECIFIED: ICD-10-CM

## 2023-06-01 DIAGNOSIS — M06.9 RHEUMATOID ARTHRITIS, UNSPECIFIED: ICD-10-CM

## 2023-06-01 DIAGNOSIS — G47.30 SLEEP APNEA, UNSPECIFIED: ICD-10-CM

## 2023-06-01 DIAGNOSIS — Z87.891 PERSONAL HISTORY OF NICOTINE DEPENDENCE: ICD-10-CM

## 2023-06-01 DIAGNOSIS — J34.2 DEVIATED NASAL SEPTUM: ICD-10-CM

## 2023-07-07 ENCOUNTER — APPOINTMENT (OUTPATIENT)
Dept: RHEUMATOLOGY | Facility: CLINIC | Age: 43
End: 2023-07-07
Payer: COMMERCIAL

## 2023-07-07 VITALS
DIASTOLIC BLOOD PRESSURE: 70 MMHG | WEIGHT: 195 LBS | HEART RATE: 113 BPM | SYSTOLIC BLOOD PRESSURE: 98 MMHG | OXYGEN SATURATION: 97 % | BODY MASS INDEX: 29.55 KG/M2 | HEIGHT: 68 IN | TEMPERATURE: 97.3 F

## 2023-07-07 PROBLEM — Z87.39 PERSONAL HISTORY OF OTHER DISEASES OF THE MUSCULOSKELETAL SYSTEM AND CONNECTIVE TISSUE: Chronic | Status: ACTIVE | Noted: 2023-05-23

## 2023-07-07 PROBLEM — Z86.69 PERSONAL HISTORY OF OTHER DISEASES OF THE NERVOUS SYSTEM AND SENSE ORGANS: Chronic | Status: ACTIVE | Noted: 2023-05-23

## 2023-07-07 PROCEDURE — 99214 OFFICE O/P EST MOD 30 MIN: CPT

## 2023-07-07 NOTE — ASSESSMENT
[FreeTextEntry1] : 41 yo .. 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 slowly improving w/ return of strength\par - 5/23 sinus surgery for recurrent infections.. \par \par 1) Inflammatory arthropathy- seropositive RA + RF 40, CCP > 250, neg HLAB27 and nl SI joints, NO erosions \par Started Actemra, now has had about 3-4 doses- held x 2 wks for sinus sx - restarted 2 wks ago\par Actually NO synovitis today or in past month... tolerating medicaitons w/out issues.. \par Actemra wkly, Rasuvo 15 mg wkly and try to avoid steroids .. will call if needed.   \par -Possible inflammatory eye disease, endorsing worsening eye pain and new onset dry eyes but denies vision changes, needs to see eye doctor - symptoms resolved now has not seen opth\par -Summary: \par Started Humira 10/20 and added MTX with intermittent flares every few months still in monoarticular pattern... L foot/ achilles still uncomfortable but tolerable...still no psoriasis, one patch years ago.. though nails have been dystrophic... Joints involved elbows, SAB, achilles, and intermittent wrists and hands\par -Stopped Humira 4/2023 and started Actemra due to recurrent sinusitis \par -Elevated UA with +DECT started on allopurinol with normal UA now, believe gout is better controlled now and feels like flares are RA vs PsA\par Nail dystrophy possible pitting  \par - SSA in past initially tolerated then developed Nausea..and inadequate response \par - FH significant for both Gout and PsA..\par - No personal of, AS, inflammatory back, bowel or eye dz\par Denies renal calculi\par \par \par Discussion\par -Patient frustrated with flares and acute illnesses. \par -Due to migratory pauciarticular asymmetrical inflammatory arthritis need to treat for both RA and PsA. Patient not a good candidate for BOONE inhibitiors due to metabolic profile (Elevated A1C, HLD, HTN, Overweight) \par -Orencia is best option for RA and suspected PsA\par -Would consider Actemera for RA but not approved for PsA\par -Wanted to start Orencia but unclear if it was not approved?? Patient does not want to come in for infusions \par - caution with MTX given NAFLD with elevated ALT at baseline, still elevated but stable...will need to continue to monitor closely now on higher dose MTX and many other medications given NAFLD AST max 69-> nl now and ALT max 156-> now 77 w/ nl bili \par \par 2)  metabolic syndrome:   needs to review with PCP, still concerned about persistently elevated LDL along w/ mildly elevated HbA1c\par - GOUTY arthropathy: UA 8.8 baseline confirmed on DECT.... crystal deposition. Allopurinol started and titrated to 300 mg now UA 5.2.  Well tolerated\par - HTN: well controlled on treatment /80\par - overweight BMI 28-> 31 now w/ steroids.-> 28 -->29, frequent use of steroids \par - NAFLD:  persistently elevated ALT 45-50 -> LFTs fine 1/21 -> 3/21 nl --> 1/23 ALT 47 --> 5/23 ALT 77\par - HLD: 1/23 , , HD 54,  -> slightly improved from prior.. needs to d/w PCP\par - HbA1c 5.9  -> 5.8-> 5.5 -->6.1 \par - Confirmed WILMER:  now on CPAP but little improvement, struggling with tolerating..working w/ ENT provider \par \par 3) radiculopathy/ myelopathy:  \par Cervical: severe HNP at C 4-5 - 5-6 with LUE paresthesias/ weakness, sx indicated. Better following sx.. paresthesias nearly fully resolved but weak  str persists\par - Updated eval with persistent LUE weakness\par LS;  diffuse DJD throughout but no severe nv foraminal / or central stenosis and nothing that matches paresthesias worse on LLE (compression noted R)\par -Had MR T-L spine with mild-moderate pathology - see data. Could benefit from epidurals especially to LS, encouraged patient to follow up about this\par -Not interested at this time in starting gabapentin but could consider in future if needed on a PRN basis \par \par 4) Lifestyle Change/ frustration \par -Denies overt depression or anxiety but endorses frustration with new lifestyle limitations, long-term from work, and decreased energy to care and be with family and friends \par -Not willing to start depression medication now, doesn’t want to be on something daily but would be a good candidate for Cymbalta or Wellbutrin if willing to reconsider in future.  \par \par Plan: \par -Continue Actemra for now and evaluate flare response \par \par -instructed to hold Actemra for acute infections \par \par -Talk to ENT about montelukast but may not be necessary following sinus sx. \par \par - continue to take colchicine as needed but UA is well below normal on 300 mg allopurinol \par \par - continue  MTX (methotrexate) Rasuvo 15 mg by SQ injection.\par \par - Folic acid 1-3 mg daily on all the days you don't take MTX.  Start at 1 mg and increase in having any SE.\par \par - complete labs prior to vacation at the end of the month...then complete labs monthly \par \par - needs refill on rasuvo \par \par - RTO in 9/2023 and 12/2023 \par \par -Follow up about addition of cymbalta or wellbutrin not necessary at this point.. may try to connect the [employer] with RA.. \par \par - Continue w/ intermittent pred to 20 mg .. for flare, track frequency... no more than 20mg at a time unless it is a GOUT flare - call first...gave RN call line to get through to provider if flares occur, is aware to call even if its a weekend \par \par - needs eye exam newly developed dry eyes (baseline eval needed still) \par \par - Continue w/ 300 mg allopurinol life long... TRY NOT to miss doses because that's what triggers a flare.. (usually very well tolerated) \par \par - fatigue: you need to continue to address the poor sleep. Continue to work w/ ENT to adjust ..  \par \par \par

## 2023-07-07 NOTE — REVIEW OF SYSTEMS
[Eye Pain] : eye pain [Dry Eyes] : dryness of the eyes [Nasal Discharge] : nasal discharge [Arthralgias] : arthralgias [Joint Pain] : joint pain [Joint Swelling] : joint swelling [Joint Stiffness] : joint stiffness [Dry Skin] : dry skin [Sleep Disturbances] : sleep disturbances [Negative] : Heme/Lymph [Fever] : no fever [Chills] : no chills [Recent Weight Gain (___ Lbs)] : no recent weight gain [Recent Weight Loss (___ Lbs)] : no recent weight loss [Eyesight Problems] : no eyesight problems [Discharge From Eyes] : no purulent discharge from the eyes [Earache] : no earache [Nosebleeds] : no nosebleeds [Sore Throat] : no sore throat [Chest Pain] : no chest pain [Palpitations] : no palpitations [Shortness Of Breath] : no shortness of breath [Cough] : no cough [Abdominal Pain] : no abdominal pain [Constipation] : no constipation [Diarrhea] : no diarrhea [Heartburn] : no heartburn [Anxiety] : no anxiety [Depression] : no depression [FreeTextEntry2] : wt gain 20 lbs 190-> 208-> 205->190 -->195 (stable); no formal exercise regimen but active all day [FreeTextEntry3] : at times severe requiring saline gtts routinely  [FreeTextEntry4] : NO spinal ttt  [FreeTextEntry7] : no sx of inflammatory bowel dz [FreeTextEntry8] : denies any rash/psoriasis to groin, denies previous STD  [FreeTextEntry9] : PRIOR: recurrent sudden pauciarticular improved dramatically but in migratory pattern --> still and takes prednisone with relief when flaring  [de-identified] : No rashes, no skin changes, no nail deformities and still Prior: nail abn and possible rash on R shin months ago- nothing now  [de-identified] : WILMER confirmed, on CPAP, denies depression but endorses frustration with how pain affects his life and ability to do things [de-identified] : recurrent infections on humira, less so with Actemra

## 2023-07-07 NOTE — PHYSICAL EXAM
[General Appearance - Alert] : alert [General Appearance - In No Acute Distress] : in no acute distress [Sclera] : the sclera and conjunctiva were normal [Neck Appearance] : the appearance of the neck was normal [Thyroid Diffuse Enlargement] : the thyroid was not enlarged [Respiration, Rhythm And Depth] : normal respiratory rhythm and effort [Auscultation Breath Sounds / Voice Sounds] : lungs were clear to auscultation bilaterally [Heart Rate And Rhythm] : heart rate was normal and rhythm regular [Heart Sounds] : normal S1 and S2 [Heart Sounds Gallop] : no gallops [Murmurs] : no murmurs [Edema] : there was no peripheral edema [Cervical Lymph Nodes Enlarged Posterior Bilaterally] : posterior cervical [Cervical Lymph Nodes Enlarged Anterior Bilaterally] : anterior cervical [Supraclavicular Lymph Nodes Enlarged Bilaterally] : supraclavicular [No CVA Tenderness] : no ~M costovertebral angle tenderness [Abnormal Walk] : normal gait [Nail Clubbing] : no clubbing  or cyanosis of the fingernails [Musculoskeletal - Swelling] : no joint swelling seen [Motor Tone] : muscle strength and tone were normal [] : no rash [Oriented To Time, Place, And Person] : oriented to person, place, and time [Impaired Insight] : insight and judgment were intact [Affect] : the affect was normal [Motor Exam] : the motor exam was normal [FreeTextEntry1] : frustrated with ongoing issues, denies depression - better today

## 2023-07-07 NOTE — HISTORY OF PRESENT ILLNESS
[FreeTextEntry1] : 7/7/2023\par \par - he briefly held actemra x 2 wk due to sinus surgery and recovery period.. has restarted last 2 wks\par - steroids given for procedure and as result did not have joint inflammation. no flares since last visit (had been experiencing 5-6 every few months)\par - her had sinus surgery at the end of May 2023....reportedly extensive pathology (need to review w/ D Cornetta and see path reports).. feeling better now \par  \par - neck and shoulders are about the same.....denies improvement even w/ PT.. tolerable on current regimen... str decrease L sl worse but stable \par - has been discharged from [facility] - disability 2/2 injury on the job.. currently seeking SSDI.. has not been able to work over past few ys 2/2 multiple injuries, surgeries.. complications.. \par - Continues on Rasuvo 15 mg qweek, compliant and tolerating. \par -Also c/o of dry eyes, feels like he has sand in his eyes, not occurring every day, occurring sporadically. Hasn't seen an eye doctor for this yet and not active at this time\par Denies fever, chills, rash, sob, pleuritic pain, chest pain, palpitations, abdominal pain, n/v/d. Denies MI/CVA, DVT/PE. Denies depression or anxiety but endorses frustration with pain and the effect that this has on his life. \par \par Feels like he flares with any exertional activity better lately \par Currently experiences paresthesia in his R foot (new and in middle of w/u- MR LS reviewed 5/23 w/ diffuse DJD changes and nv entrapment worse on LLE- but RLE is where paresthesia started) and weakness in LUE (chronic severe neck issues despite Cspine ortho sx  )\par \par \par 1) Pauciarthropathy + CCP high titer:  \par 2) Metabolic syndrome:  elevated Lipids, HbA1c and UA 8.8\par 3) Cervical radiculopathy s/p laminectomy\par 4) Gout\par ___________________________________________________________________________-\par \par \par (Initial HPI 6/23/20) \par 40 yo .. w/ new onset severe pain and swelling in R 1st MTP recurrently in past month.  \par Immediate and nearly full resolution with steroids.  Started on Colchicine once daily, but pain/ swelling returned, another course of steroids needed.  Initial episode 1 m ago... \par Hx in past 2 months sudden onset L shoulder pain/ stiffness spontaneous onset and resolution within 1 wks- MRI + subacromial bursitis.. \par Similar sudden onset R lateral hip pain severe for several days\par ROS otherwise denies inflammatory eye, back, bowel or rash. Denies renal calculi, tophi \par nail deformities.  No constitutional sx and Wt stable for past 15 lbs\par .  \par FH significant for:  \par - gout + FH father and MGM.. \par - Psoriasis:  brother, 1st cousin \par - T2DM both parents \par \par PMH:  \par HTN mild, well controlled \par On HCTZ/ lisinopril for HTN well controlled \par

## 2023-07-12 ENCOUNTER — TRANSCRIPTION ENCOUNTER (OUTPATIENT)
Age: 43
End: 2023-07-12

## 2023-08-03 ENCOUNTER — TRANSCRIPTION ENCOUNTER (OUTPATIENT)
Age: 43
End: 2023-08-03

## 2023-09-12 LAB
ALBUMIN SERPL ELPH-MCNC: 4.4 G/DL
ALP BLD-CCNC: 72 U/L
ALT SERPL-CCNC: 70 U/L
ANION GAP SERPL CALC-SCNC: 14 MMOL/L
AST SERPL-CCNC: 33 U/L
BILIRUB SERPL-MCNC: 0.3 MG/DL
BUN SERPL-MCNC: 17 MG/DL
CALCIUM SERPL-MCNC: 9.7 MG/DL
CHLORIDE SERPL-SCNC: 102 MMOL/L
CO2 SERPL-SCNC: 24 MMOL/L
CREAT SERPL-MCNC: 0.89 MG/DL
CRP SERPL-MCNC: 3 MG/L
EGFR: 111 ML/MIN/1.73M2
ERYTHROCYTE [SEDIMENTATION RATE] IN BLOOD BY WESTERGREN METHOD: 19 MM/HR
GLUCOSE SERPL-MCNC: 84 MG/DL
POTASSIUM SERPL-SCNC: 4 MMOL/L
PROT SERPL-MCNC: 6.8 G/DL
SODIUM SERPL-SCNC: 140 MMOL/L

## 2023-09-13 ENCOUNTER — APPOINTMENT (OUTPATIENT)
Dept: RHEUMATOLOGY | Facility: CLINIC | Age: 43
End: 2023-09-13
Payer: COMMERCIAL

## 2023-09-13 VITALS
TEMPERATURE: 97.2 F | HEART RATE: 101 BPM | BODY MASS INDEX: 30.31 KG/M2 | OXYGEN SATURATION: 97 % | WEIGHT: 200 LBS | DIASTOLIC BLOOD PRESSURE: 70 MMHG | HEIGHT: 68 IN | SYSTOLIC BLOOD PRESSURE: 100 MMHG

## 2023-09-13 DIAGNOSIS — B99.9 UNSPECIFIED INFECTIOUS DISEASE: ICD-10-CM

## 2023-09-13 PROCEDURE — 99215 OFFICE O/P EST HI 40 MIN: CPT

## 2023-09-13 RX ORDER — BENZONATATE 200 MG/1
200 CAPSULE ORAL
Qty: 30 | Refills: 0 | Status: DISCONTINUED | COMMUNITY
Start: 2022-10-14 | End: 2023-09-13

## 2023-09-13 RX ORDER — TOCILIZUMAB 180 MG/ML
162 INJECTION, SOLUTION SUBCUTANEOUS
Qty: 12 | Refills: 3 | Status: DISCONTINUED | COMMUNITY
Start: 2023-03-21 | End: 2023-09-13

## 2023-09-22 ENCOUNTER — NON-APPOINTMENT (OUTPATIENT)
Age: 43
End: 2023-09-22

## 2023-09-22 ENCOUNTER — APPOINTMENT (OUTPATIENT)
Dept: CARDIOLOGY | Facility: CLINIC | Age: 43
End: 2023-09-22
Payer: COMMERCIAL

## 2023-09-22 VITALS
RESPIRATION RATE: 16 BRPM | BODY MASS INDEX: 30.92 KG/M2 | HEART RATE: 76 BPM | DIASTOLIC BLOOD PRESSURE: 80 MMHG | SYSTOLIC BLOOD PRESSURE: 112 MMHG | WEIGHT: 204 LBS | HEIGHT: 68 IN

## 2023-09-22 DIAGNOSIS — R01.1 CARDIAC MURMUR, UNSPECIFIED: ICD-10-CM

## 2023-09-22 DIAGNOSIS — Z74.09 OTHER REDUCED MOBILITY: ICD-10-CM

## 2023-09-22 DIAGNOSIS — E88.81 METABOLIC SYNDROME: ICD-10-CM

## 2023-09-22 DIAGNOSIS — R09.89 OTHER SPECIFIED SYMPTOMS AND SIGNS INVOLVING THE CIRCULATORY AND RESPIRATORY SYSTEMS: ICD-10-CM

## 2023-09-22 DIAGNOSIS — L40.50 ARTHROPATHIC PSORIASIS, UNSPECIFIED: ICD-10-CM

## 2023-09-22 PROCEDURE — 93000 ELECTROCARDIOGRAM COMPLETE: CPT

## 2023-09-22 PROCEDURE — 99204 OFFICE O/P NEW MOD 45 MIN: CPT | Mod: 25

## 2023-09-22 RX ORDER — NIRMATRELVIR AND RITONAVIR 300-100 MG
20 X 150 MG & KIT ORAL
Qty: 30 | Refills: 0 | Status: DISCONTINUED | COMMUNITY
Start: 2022-10-14 | End: 2023-09-22

## 2023-09-22 RX ORDER — ALBUTEROL SULFATE 90 UG/1
108 (90 BASE) INHALANT RESPIRATORY (INHALATION)
Qty: 7 | Refills: 0 | Status: DISCONTINUED | COMMUNITY
Start: 2022-07-26 | End: 2023-09-22

## 2023-09-24 ENCOUNTER — TRANSCRIPTION ENCOUNTER (OUTPATIENT)
Age: 43
End: 2023-09-24

## 2023-09-24 RX ORDER — TOFACITINIB 11 MG/1
11 TABLET, FILM COATED, EXTENDED RELEASE ORAL
Qty: 90 | Refills: 3 | Status: DISCONTINUED | COMMUNITY
Start: 2023-09-13 | End: 2023-09-24

## 2023-10-18 ENCOUNTER — APPOINTMENT (OUTPATIENT)
Dept: CARDIOLOGY | Facility: CLINIC | Age: 43
End: 2023-10-18
Payer: COMMERCIAL

## 2023-10-18 PROCEDURE — 93306 TTE W/DOPPLER COMPLETE: CPT

## 2023-10-18 PROCEDURE — 93880 EXTRACRANIAL BILAT STUDY: CPT

## 2023-10-19 ENCOUNTER — APPOINTMENT (OUTPATIENT)
Dept: CARDIOLOGY | Facility: CLINIC | Age: 43
End: 2023-10-19

## 2023-10-24 ENCOUNTER — APPOINTMENT (OUTPATIENT)
Dept: RHEUMATOLOGY | Facility: CLINIC | Age: 43
End: 2023-10-24

## 2023-10-31 LAB
ALBUMIN SERPL ELPH-MCNC: 4.5 G/DL
ALP BLD-CCNC: 90 U/L
ALT SERPL-CCNC: 43 U/L
ANION GAP SERPL CALC-SCNC: 14 MMOL/L
AST SERPL-CCNC: 28 U/L
BILIRUB SERPL-MCNC: 0.4 MG/DL
BUN SERPL-MCNC: 15 MG/DL
CALCIUM SERPL-MCNC: 10.1 MG/DL
CHLORIDE SERPL-SCNC: 102 MMOL/L
CO2 SERPL-SCNC: 24 MMOL/L
CREAT SERPL-MCNC: 0.89 MG/DL
CRP SERPL-MCNC: 19 MG/L
EGFR: 110 ML/MIN/1.73M2
ERYTHROCYTE [SEDIMENTATION RATE] IN BLOOD BY WESTERGREN METHOD: 53 MM/HR
GLUCOSE SERPL-MCNC: 99 MG/DL
POTASSIUM SERPL-SCNC: 4.6 MMOL/L
PROT SERPL-MCNC: 7.2 G/DL
SODIUM SERPL-SCNC: 140 MMOL/L

## 2023-11-20 ENCOUNTER — APPOINTMENT (OUTPATIENT)
Dept: RHEUMATOLOGY | Facility: CLINIC | Age: 43
End: 2023-11-20
Payer: COMMERCIAL

## 2023-11-20 VITALS
SYSTOLIC BLOOD PRESSURE: 110 MMHG | HEART RATE: 113 BPM | WEIGHT: 195 LBS | BODY MASS INDEX: 29.55 KG/M2 | DIASTOLIC BLOOD PRESSURE: 85 MMHG | HEIGHT: 68 IN | OXYGEN SATURATION: 95 %

## 2023-11-20 DIAGNOSIS — K76.0 FATTY (CHANGE OF) LIVER, NOT ELSEWHERE CLASSIFIED: ICD-10-CM

## 2023-11-20 DIAGNOSIS — M25.552 PAIN IN LEFT HIP: ICD-10-CM

## 2023-11-20 DIAGNOSIS — R76.8 OTHER SPECIFIED ABNORMAL IMMUNOLOGICAL FINDINGS IN SERUM: ICD-10-CM

## 2023-11-20 DIAGNOSIS — F41.8 OTHER SPECIFIED ANXIETY DISORDERS: ICD-10-CM

## 2023-11-20 DIAGNOSIS — R74.8 ABNORMAL LEVELS OF OTHER SERUM ENZYMES: ICD-10-CM

## 2023-11-20 DIAGNOSIS — R53.83 OTHER FATIGUE: ICD-10-CM

## 2023-11-20 PROCEDURE — 99214 OFFICE O/P EST MOD 30 MIN: CPT | Mod: 25

## 2023-11-20 PROCEDURE — 96372 THER/PROPH/DIAG INJ SC/IM: CPT

## 2023-11-20 RX ORDER — CITALOPRAM HYDROBROMIDE 10 MG/1
10 TABLET, FILM COATED ORAL
Qty: 30 | Refills: 2 | Status: DISCONTINUED | COMMUNITY
Start: 2023-09-13 | End: 2023-11-20

## 2023-11-20 RX ORDER — TRIAMCINOLONE ACETONIDE 80 MG/ML
80 INJECTION, SUSPENSION INTRA-ARTICULAR; INTRAMUSCULAR
Qty: 1 | Refills: 0 | Status: COMPLETED | OUTPATIENT
Start: 2023-11-20

## 2023-11-20 RX ADMIN — TRIAMCINOLONE ACETONIDE 0 MG/ML: 80 INJECTION, SUSPENSION INTRA-ARTICULAR; INTRAMUSCULAR at 00:00

## 2023-12-11 ENCOUNTER — OUTPATIENT (OUTPATIENT)
Dept: OUTPATIENT SERVICES | Facility: HOSPITAL | Age: 43
LOS: 1 days | End: 2023-12-11
Payer: COMMERCIAL

## 2023-12-11 ENCOUNTER — APPOINTMENT (OUTPATIENT)
Dept: MRI IMAGING | Facility: CLINIC | Age: 43
End: 2023-12-11
Payer: COMMERCIAL

## 2023-12-11 DIAGNOSIS — M25.552 PAIN IN LEFT HIP: ICD-10-CM

## 2023-12-11 DIAGNOSIS — Z98.890 OTHER SPECIFIED POSTPROCEDURAL STATES: Chronic | ICD-10-CM

## 2023-12-11 PROCEDURE — 73721 MRI JNT OF LWR EXTRE W/O DYE: CPT | Mod: 26,LT

## 2023-12-11 PROCEDURE — 73721 MRI JNT OF LWR EXTRE W/O DYE: CPT

## 2023-12-15 ENCOUNTER — APPOINTMENT (OUTPATIENT)
Dept: RHEUMATOLOGY | Facility: CLINIC | Age: 43
End: 2023-12-15
Payer: COMMERCIAL

## 2023-12-15 VITALS
WEIGHT: 195 LBS | BODY MASS INDEX: 29.55 KG/M2 | TEMPERATURE: 97.3 F | HEART RATE: 92 BPM | HEIGHT: 68 IN | SYSTOLIC BLOOD PRESSURE: 126 MMHG | OXYGEN SATURATION: 97 % | DIASTOLIC BLOOD PRESSURE: 78 MMHG

## 2023-12-15 PROCEDURE — 96372 THER/PROPH/DIAG INJ SC/IM: CPT

## 2023-12-15 PROCEDURE — 99215 OFFICE O/P EST HI 40 MIN: CPT | Mod: 25

## 2023-12-15 RX ORDER — KETOROLAC TROMETHAMINE 60 MG/2ML
60 INJECTION, SOLUTION INTRAMUSCULAR
Qty: 1 | Refills: 0 | Status: COMPLETED | OUTPATIENT
Start: 2023-12-15

## 2023-12-15 RX ADMIN — KETOROLAC TROMETHAMINE 0 MG/2ML: 60 INJECTION, SOLUTION INTRAMUSCULAR at 00:00

## 2023-12-20 LAB
ALBUMIN SERPL ELPH-MCNC: 4.4 G/DL
ALP BLD-CCNC: 97 U/L
ALT SERPL-CCNC: 38 U/L
ANION GAP SERPL CALC-SCNC: 11 MMOL/L
AST SERPL-CCNC: 23 U/L
BILIRUB SERPL-MCNC: 0.2 MG/DL
BUN SERPL-MCNC: 15 MG/DL
CALCIUM SERPL-MCNC: 10 MG/DL
CHLORIDE SERPL-SCNC: 102 MMOL/L
CO2 SERPL-SCNC: 27 MMOL/L
CREAT SERPL-MCNC: 0.92 MG/DL
CRP SERPL-MCNC: 8 MG/L
EGFR: 107 ML/MIN/1.73M2
ERYTHROCYTE [SEDIMENTATION RATE] IN BLOOD BY WESTERGREN METHOD: 45 MM/HR
GLUCOSE SERPL-MCNC: 101 MG/DL
HCT VFR BLD CALC: 41 %
HGB BLD-MCNC: 12.8 G/DL
MCHC RBC-ENTMCNC: 27.4 PG
MCHC RBC-ENTMCNC: 31.2 GM/DL
MCV RBC AUTO: 87.6 FL
PLATELET # BLD AUTO: 372 K/UL
POTASSIUM SERPL-SCNC: 4.3 MMOL/L
PROT SERPL-MCNC: 6.9 G/DL
RBC # BLD: 4.68 M/UL
RBC # FLD: 13.7 %
SODIUM SERPL-SCNC: 139 MMOL/L
TESTOST FREE SERPL-MCNC: 2.6 PG/ML
TESTOST SERPL-MCNC: 167 NG/DL
WBC # FLD AUTO: 7.67 K/UL

## 2023-12-20 NOTE — PROCEDURE
[Today's Date:] : Date: [unfilled] [Soft Tissue Injection] : soft tissue injection was performed [Patient] : the patient [Alcohol] : alcohol [Tolerated Well] : the patient tolerated the procedure well [No Complications] : there were no complications [Patient Instructed to Call] : patient was instructed to call if redness at site, a decrease in range of motion or an increase in pain is noted after procedure. [Therapeutic] : therapeutic [25 gauge 5/8  inch] : A 25 gauge 5/8 inch needle was used [de-identified] : Toradol 30mg [FreeTextEntry1] :  You were given Toradol 30mg IM today,  Take with food, this can upset your stomach. Make sure that you are well hydrated and avoid any other NSAIDs today.

## 2023-12-20 NOTE — HISTORY OF PRESENT ILLNESS
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  Advil 200mg 2-3 tabs PRN for pain not daily use. Worse after a long day of use.  - Over the last month, 3-4 courses 20-40mg x 2-3 days for acute intermittent swelling in pauciarticular asymmetrical pattern associated with kahlil redness, tenderness and overt swelling. Shows photo of left hand redness and swelling which he regards as flares -Tolerating MTX tolerating well, Orencia 125 scq inj, recent sinus infections requiring abx. Recently course 10-day bactrim followed by zpack. +Chills w/ green mucus at time of active infection w/o fevers. Today symptoms of sinus infection fully resolved -Noticed nodule base of L 3/4. No pain and tenderness today. 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 Increased fatigue, daytime napping. Known hx Herniation in back and feet go numbness. Paresthesia in his R foot (new and in middle of w/u- MR LS reviewed 5/23 w/ diffuse DJD changes and nv entrapment worse on LLE- but RLE is where paresthesia started) and weakness in LUE (chronic severe neck issues despite Cspine ortho sx  )- chronic persistent c/o -1 year ago stopped working - ? cardiac eval completed  Denies fever, chills, rash, sob, pleuritic pain, chest pain, palpitations, abdominal pain, n/v/d. Denies MI/CVA, DVT/PE. Denies depression or anxiety but endorses frustration with pain and the effect that this has on his life, still  - did not make appt w/ Super specialist- Dr Perea...   1) Pauciarthropathy + CCP high titer:   2) Metabolic syndrome:  elevated Lipids, HbA1c and UA 8.8 3) Cervical radiculopathy s/p laminectomy 4) Gout ___________________________________________________________________________-   (Initial HPI 6/23/20)  40 yo .. w/ new onset severe pain and swelling in R 1st MTP recurrently in past month.   Immediate and nearly full resolution with steroids.  Started on Colchicine once daily, but pain/ swelling returned, another course of steroids needed.  Initial episode 1 m ago...  Hx in past 2 months sudden onset L shoulder pain/ stiffness spontaneous onset and resolution within 1 wks- MRI + subacromial bursitis..  Similar sudden onset R lateral hip pain severe for several days ROS otherwise denies inflammatory eye, back, bowel or rash. Denies renal calculi, tophi  nail deformities.  No constitutional sx and Wt stable for past 15 lbs .   FH significant for:   - gout + FH father and MGM..  - Psoriasis:  brother, 1st cousin  - T2DM both parents   PMH:   HTN mild, well controlled  On HCTZ/ lisinopril for HTN well controlled

## 2023-12-20 NOTE — ASSESSMENT
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 slowly improving w/ return of strength - 5/23 sinus surgery for recurrent infections.. - 9/23 comprehensive cardiac eval (Dr Luque:  nl Carotids (11/23); TTE (10/23) nl; ECG (9/23)nl  1) Inflammatory arthropathy- seropositive RA + RF 40, CCP > 250, neg HLAB27 and nl SI joints (? psoriatic patch ys ago), NO erosions Presentation is always in asymmetrical pauciarticular pattern (still)  Currently taking Rasuvo 15mg and Orencia weekly injections (started 10/23) Kenalog 80mg IM given at last 11/23 appt. Continues to report intermittent flares today, reports Chronic L hip pain/ limited ROM (evaluated Xray/MRI neg AVN), intermittent b/l hand pain and stiffness, neck stiffness and pain. No improvement on flares w/ additional colchicine, patient continues to take frequent doses high dose steroids due to pain and reported swelling 20- mg x several days.. always effective. (3 -4episodes since last visit 11/23)  No obvious synovitis on exam. Does show photo of what appears to be active synovitis redness and swelling. Minimal physical activity (including household chores) throughout the day due to pain flares and has been on disability for the last year.  Partially controlled disease requiring additional medication adjustments limited by NAFLD.. elevated LFTs Still no active psoriasis and recent possible inflammatory eye disease, endorsing worsening eye pain and new onset dry eyes but denies vision changes, needs to see eye doctor - symptoms resolved now has not seen opth.. continues to be controlled at this time with no return Repeated infections over past few ys. Recent sinus infection requiring 2 courses of antibiotics bactrim 10-day and zpack... unclear if biologic related or high dose steroid related.  Emphasized repeatedly need to minimize but as noted still has intermittent flares.   Still would like to consider Nikci but concerned about BB warning in setting of metabolic syndrome:  .working cardiology in past Dr Luque. CV events before starting given overweight/ HLD, preDM, WILMER, and NAFLD. Hasn't been able to exercise 2/2 pain...  -Given IM Toradol 30mg today. Patient advised to drink plenty of fluids no additional NSAIDs today. -Start Cyclobenzaprine 10mg bedtime, can decrease to 5mg if too sedating.- disrupted sleep contributing to chronic stress and pain  Lengthy discussion regarding potential change in tx.. will try adding 10mg leflunomide to MTX and Orencia..  - Will require close monitoring monthly at least initially 2/2   NAFLD with elevated ALT at baseline, previously elevated nl 10/23...(in past) AST max 69-> nl now and ALT max 156-> now 77 w/ nl bili NOTE: - full cardiac w/u 11/23 NEG - Actemra recently after 3-4 doses- experienced severe allergic reaction with diffuse hives "everywhere".. better now, unable to tell if this was effective, did experience 2 serious infections while taking..  - Initial tx: Started Humira 10/20 and added MTX with intermittent flares every few months still in monoarticular pattern... L foot/ achilles (most recurrently)...never achieved remission -Stopped Humira 4/2023 and started Actemra due to recurrent sinusitis -Elevated UA with +DECT started on allopurinol with normal UA now (< 6.0) believe gout is better controlled now and feels like flares are RA vs PsA - SSA in past initially tolerated then developed Nausea..and inadequate response - FH significant for both Gout and PsA.. - No personal of, AS, inflammatory back, bowel or eye dz Denies renal calculi  Discussion -Patient remains frustrated with flares  hasn't achieved remission in past 2 ys.. 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 - HTN: well controlled on treatment /80 - overweight BMI 28-> 31 now w/ steroids.-> 28 -->29, frequent use of steroids - NAFLD: persistently elevated ALT 45-50 -> LFTs fine 1/21 -> 3/21 nl --> 1/23 ALT 47 --> 5/23 ALT 77 - HLD: 1/23 , , HD 54,  -> slightly improved from prior.. needs to d/w PCP - HbA1c 5.9 -> 5.8-> 5.5 -->6.1 - Confirmed WILMER: now on CPAP but little improvement, struggling with tolerating..working w/ ENT provider  3) radiculopathy/ myelopathy: neither acute at this time, chronic LUE limited ROM though Cervical: severe HNP at C 4-5 - 5-6 with LUE paresthesias/ weakness, sx indicated. Better following sx.. paresthesias nearly fully resolved but weak  str persists - Updated eval with persistent LUE weakness working with neurosx LS; diffuse DJD throughout but no severe nv foraminal / or central stenosis and nothing that matches paresthesias worse on LLE (compression noted R) as described..  -Had MR T-L spine with mild-moderate pathology - see data. Could benefit from epidurals especially to LS, encouraged patient to follow up about this -Not interested at this time in starting gabapentin but could consider in future if needed on a PRN basis  4) Lifestyle Change/ frustration -Denies overt depression or anxiety but endorses frustration with new lifestyle limitations, [facility] from work, and decreased energy to care and be with family and friends -Not willing to start medication for depression now, doesn't want to be on something daily but would be a good candidate for Cymbalta or Wellbutrin if willing to reconsider in future, discussed loss and normal response..    Plan: - complete labs ASAP  -start leflunomide 10mg daily and continue MTX 15 mg wkly   -Given IM Toradol 30mg today.. avoid steroids or NSAIDs today   -Still recommend referral to Dr. Perea for second opinion on inflammatory condition  -Start Cyclobenzaprine 10mg bedtime, can decrease to 5mg if too sedating.  Highly recommend routine use to improve quality of sleep and continue to work with pulm for CPAP  - Minimize prednisone use repeatedly reviewed AE of frequent HD prednisone use (infections/ AVN, metabolic syndrome as noted). Contact via text if experiencing flare and we will discuss prednisone from there (repeatedly advised but not done)  - Folic acid 1-3 mg daily on all the days you don't take MTX. Start at 1 mg and increase in having any SE.  - needs eye exam newly developed dry eyes (baseline eval needed still) again advised   - Continue w/ 300 mg allopurinol... TRY NOT to miss doses because that's what triggers a flare.. (usually very well tolerated)  - fatigue: you need to continue to address the poor sleep. Continue to work w/ ENT to adjust.. and WILMER treatment/ CPAP..  - needs routine exercise regimen.. conditioning- may need to start with PT..has not been willing to consider but is absolutely necessary now ordered initially 1/23.. not done, will again order ... through [employer]  - support idea of SSDI but needs aggressive PT for conditioning..   - mnthly labs for at least 3-6 m on combined MTX/ LEF treatment   - RTO 02/2024 and continue appt every 2 m till remission.  Still recommend appt with Dr Perea .

## 2023-12-20 NOTE — CONSULT LETTER
[Dear  ___] : Dear  [unfilled], [Courtesy Letter:] : I had the pleasure of seeing your patient, [unfilled], in my office today. [Referral Closing:] : Thank you very much for seeing this patient.  If you have any questions, please do not hesitate to contact me. [Sincerely,] : Sincerely, [FreeTextEntry2] : Prasad Avila MD  [DrDebi  ___] : Dr. CANAS [FreeTextEntry3] : Angie Leslie DNP, ANP-C Division or Rheumatology [facility]

## 2023-12-20 NOTE — PHYSICAL EXAM
[General Appearance - Alert] : alert [General Appearance - In No Acute Distress] : in no acute distress [Sclera] : the sclera and conjunctiva were normal [Neck Appearance] : the appearance of the neck was normal [Thyroid Diffuse Enlargement] : the thyroid was not enlarged [Respiration, Rhythm And Depth] : normal respiratory rhythm and effort [Auscultation Breath Sounds / Voice Sounds] : lungs were clear to auscultation bilaterally [Heart Rate And Rhythm] : heart rate was normal and rhythm regular [Heart Sounds] : normal S1 and S2 [Heart Sounds Gallop] : no gallops [Murmurs] : no murmurs [Edema] : there was no peripheral edema [No CVA Tenderness] : no ~M costovertebral angle tenderness [Abnormal Walk] : normal gait [Nail Clubbing] : no clubbing  or cyanosis of the fingernails [Musculoskeletal - Swelling] : no joint swelling seen [Motor Tone] : muscle strength and tone were normal [] : no rash [Motor Exam] : the motor exam was normal [Oriented To Time, Place, And Person] : oriented to person, place, and time [Impaired Insight] : insight and judgment were intact [Affect] : the affect was normal [Outer Ear] : the ears and nose were normal in appearance [Nasal Cavity] : the nasal mucosa and septum were normal [Oropharynx] : the oropharynx was normal [Heart Sounds Pericardial Friction Rub] : no pericardial rub [FreeTextEntry1] : Increasingly frustrated with ongoing issues, denies depression - still active

## 2023-12-20 NOTE — DATA REVIEWED
[FreeTextEntry1] : Labs:  6/20 RF 28, CCP > 250, ALT 59 (known NAFLD), UA 8.8, nl CRP, UA, ESR 20. Neg HLAB27, Covid HbA1c 5.9- Uric acid 8.8  , HD 45, ,   Diagnostics:  MRI L shoulder 6/20 no evidence of RTC pathology and nl GH/ AC joint w/ no effusions.   MRI L shoulder 1/17/2023: degenerative changes at AC joint , no effusions or erosions, RT cuffs intact w/o tears  MRI Thoracic 1/17/2023: multilevel intervertebral disc dessication. 3mm central disc protrusion at T4-T5. No neural foraminal narrowing, no spinal canal stenosis   MRI Lumbar Spine 1/17/2023:  L3-L4 3mm broad based left foraminal zone disc protrusion. Left neural foraminal narrowing. No spinal canal stenosis L4-L5: 3mm circumferential disc bulge. bl neural foraminal narrowing. No spinal canal stenosis. Bl facet aropathy L5-S1: bl facet joint arthrosis

## 2023-12-22 LAB
AA PROT SER-MCNC: 14 UG/ML
BIOMARKER COMMENT: NORMAL
CRP SERPL-MCNC: 7.5 MG/L
EGF SERPL-MCNC: 76 PG/ML
FOOTNOTE: NORMAL
IL6 SERPL-MCNC: 12 PG/ML
LEPTIN SERPL-MCNC: 21 NG/ML
Lab: NORMAL
Lab: NORMAL
MMP-1 SERPL-MCNC: 2.4 NG/ML
MMP-3 SERPL-MCNC: 160 NG/ML
RA DAS LEVEL QL VECTRADA: NORMAL
RESISTIN SERPL-MCNC: 4.7 NG/ML
RISK OF RADIOGRAPHIC PROGRESS: 8 %
TNFRSF1A SERPL-MCNC: 0.73 NG/ML
VAP-1 SERPL-MCNC: 0.61 UG/ML
VECTRA SCORE: 50
VEGF-A SERPL-MCNC: 350 PG/ML
YKL-40 RESULT: 33 NG/ML

## 2024-01-16 ENCOUNTER — APPOINTMENT (OUTPATIENT)
Dept: CARDIOLOGY | Facility: CLINIC | Age: 44
End: 2024-01-16

## 2024-01-25 ENCOUNTER — APPOINTMENT (OUTPATIENT)
Dept: CARDIOLOGY | Facility: CLINIC | Age: 44
End: 2024-01-25
Payer: COMMERCIAL

## 2024-01-25 ENCOUNTER — TRANSCRIPTION ENCOUNTER (OUTPATIENT)
Age: 44
End: 2024-01-25

## 2024-01-25 ENCOUNTER — NON-APPOINTMENT (OUTPATIENT)
Age: 44
End: 2024-01-25

## 2024-01-25 VITALS
DIASTOLIC BLOOD PRESSURE: 82 MMHG | BODY MASS INDEX: 30.46 KG/M2 | WEIGHT: 201 LBS | HEART RATE: 96 BPM | RESPIRATION RATE: 16 BRPM | HEIGHT: 68 IN | SYSTOLIC BLOOD PRESSURE: 124 MMHG

## 2024-01-25 DIAGNOSIS — R00.0 TACHYCARDIA, UNSPECIFIED: ICD-10-CM

## 2024-01-25 DIAGNOSIS — I10 ESSENTIAL (PRIMARY) HYPERTENSION: ICD-10-CM

## 2024-01-25 DIAGNOSIS — E78.5 HYPERLIPIDEMIA, UNSPECIFIED: ICD-10-CM

## 2024-01-25 DIAGNOSIS — G47.33 OBSTRUCTIVE SLEEP APNEA (ADULT) (PEDIATRIC): ICD-10-CM

## 2024-01-25 PROCEDURE — 99214 OFFICE O/P EST MOD 30 MIN: CPT | Mod: 25

## 2024-01-25 PROCEDURE — 93000 ELECTROCARDIOGRAM COMPLETE: CPT

## 2024-01-25 NOTE — REASON FOR VISIT
[FreeTextEntry1] : Mr. Shah is a pleasant 43-year-old white male with a past medical history significant for hypertension, anxiety, seropositive rheumatoid arthritis, and psoriasis with arthropathy, who presents for follow up evaluation.

## 2024-01-25 NOTE — DISCUSSION/SUMMARY
[FreeTextEntry1] : 1 - Intermittent lightheadedness:  patient has occasional lightheadedness with position changes.  Echocardiogram (10/18/2023):  EF is 55-60%.  The left and right atria are normal in size.  Normal right ventricular cavity size and systolic function.  Trace mitral regurgitation.  Trace pulmonic regurgitation.  Trace tricuspid regurgitation.  Estimated pulmonary artery systolic pressure is 21mmHg, consistent with normal pulmonary artery pressure.  Carotid  duplex (10/18/2023):  Right and left proximal ICAs with no stenosis.  Cardiac CT (11/17/2023):  Total coronary calcium score is ZERO.  Normal coronary arteries.  Fatty infiltrates of liver.  Patient reassured.  Advised to follow healthy, low fat, low cholesterol diet.  Increase physical activity/exercise and work on weight loss.  2 - Patient's heart rate tends to run high:  EKG today reveals sinus rhythm at 96 bpm.  Will have MrDebi Shah wear 24hr Holter monitor to assess for inappropriate tachycardia.  Advised to keep caffeine intake, hydrate well and get adequate sleep.  3 - Obstructive sleep apnea:  patient admits that he does not use his CPAP because it is very cumbersome and uncomfortable.   Advised to follow up with pulmonary to see if there are other more comfortable alternatives.  4 - Follow up in one year or sooner if needed.   [EKG obtained to assist in diagnosis and management of assessed problem(s)] : EKG obtained to assist in diagnosis and management of assessed problem(s)

## 2024-01-25 NOTE — HISTORY OF PRESENT ILLNESS
[FreeTextEntry1] : Mr. Shah presents today for results of his recent echocardiogram, carotid duplex and cardiac CTA.  He has been feeling well.  Denies complaints of exertional chest pain, shortness of breath, palpitations or syncope.  He gets intermittent lightheadedness with position changes.  Admits he could be better with hydration.

## 2024-01-25 NOTE — PHYSICAL EXAM
[Well Developed] : well developed [No Acute Distress] : no acute distress [Obese] : obese [Normal Conjunctiva] : normal conjunctiva [Normal Venous Pressure] : normal venous pressure [No Carotid Bruit] : no carotid bruit [Normal S1, S2] : normal S1, S2 [No Rub] : no rub [S4] : S4 [Clear Lung Fields] : clear lung fields [Normal Bowel Sounds] : normal bowel sounds [Normal Gait] : normal gait [No Edema] : no edema [No Rash] : no rash [Moves all extremities] : moves all extremities [Normal Speech] : normal speech [No Focal Deficits] : no focal deficits [Normal memory] : normal memory [Alert and Oriented] : alert and oriented [de-identified] : I/VI systolic murmur

## 2024-02-09 ENCOUNTER — EMERGENCY (EMERGENCY)
Facility: HOSPITAL | Age: 44
LOS: 0 days | Discharge: HOME | End: 2024-02-09
Attending: STUDENT IN AN ORGANIZED HEALTH CARE EDUCATION/TRAINING PROGRAM
Payer: COMMERCIAL

## 2024-02-09 VITALS
SYSTOLIC BLOOD PRESSURE: 135 MMHG | TEMPERATURE: 98 F | HEART RATE: 101 BPM | OXYGEN SATURATION: 95 % | DIASTOLIC BLOOD PRESSURE: 94 MMHG | RESPIRATION RATE: 18 BRPM

## 2024-02-09 VITALS — WEIGHT: 195.11 LBS | HEIGHT: 69 IN

## 2024-02-09 DIAGNOSIS — M06.9 RHEUMATOID ARTHRITIS, UNSPECIFIED: ICD-10-CM
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
## 2024-02-09 DIAGNOSIS — I10 ESSENTIAL (PRIMARY) HYPERTENSION: ICD-10-CM

## 2024-02-09 DIAGNOSIS — S61.012A LACERATION WITHOUT FOREIGN BODY OF LEFT THUMB WITHOUT DAMAGE TO NAIL, INITIAL ENCOUNTER: ICD-10-CM

## 2024-02-09 DIAGNOSIS — Z98.890 OTHER SPECIFIED POSTPROCEDURAL STATES: Chronic | ICD-10-CM

## 2024-02-09 DIAGNOSIS — Z23 ENCOUNTER FOR IMMUNIZATION: ICD-10-CM

## 2024-02-09 PROCEDURE — 12001 RPR S/N/AX/GEN/TRNK 2.5CM/<: CPT

## 2024-02-09 PROCEDURE — 90471 IMMUNIZATION ADMIN: CPT

## 2024-02-09 PROCEDURE — 73140 X-RAY EXAM OF FINGER(S): CPT | Mod: 26,LT

## 2024-02-09 PROCEDURE — 73140 X-RAY EXAM OF FINGER(S): CPT | Mod: LT

## 2024-02-09 PROCEDURE — 99284 EMERGENCY DEPT VISIT MOD MDM: CPT | Mod: 25

## 2024-02-09 PROCEDURE — 99283 EMERGENCY DEPT VISIT LOW MDM: CPT | Mod: 25

## 2024-02-09 PROCEDURE — 90715 TDAP VACCINE 7 YRS/> IM: CPT

## 2024-02-09 RX ORDER — TETANUS TOXOID, REDUCED DIPHTHERIA TOXOID AND ACELLULAR PERTUSSIS VACCINE, ADSORBED 5; 2.5; 8; 8; 2.5 [IU]/.5ML; [IU]/.5ML; UG/.5ML; UG/.5ML; UG/.5ML
0.5 SUSPENSION INTRAMUSCULAR ONCE
Refills: 0 | Status: COMPLETED | OUTPATIENT
Start: 2024-02-09 | End: 2024-02-09

## 2024-02-09 RX ADMIN — TETANUS TOXOID, REDUCED DIPHTHERIA TOXOID AND ACELLULAR PERTUSSIS VACCINE, ADSORBED 0.5 MILLILITER(S): 5; 2.5; 8; 8; 2.5 SUSPENSION INTRAMUSCULAR at 12:35

## 2024-02-09 NOTE — ED STATDOCS - OBJECTIVE STATEMENT
44 y/o male with a PMHx of HTN, RA, sleep apnea presents to the ED c/o laceration. Pt reports he was cutting chicken and cut his left thumb. Right hand dominant. Last Tdap unknown. No other complaints at this time.

## 2024-02-09 NOTE — ED STATDOCS - PROGRESS NOTE DETAILS
44 yo male with a PMH of HTN presents with L thumb laceration s/p cutting chicken today. Last tdap unknown.   Semicircualr lac the ular aspect of the L thumb involving the tip of the fingernail.   Will check XR, update tdap and eval the need for sutures. -Daniel Snider PA-C 42 yo male with a PMH of HTN presents with L thumb laceration s/p cutting chicken today. Last tdap unknown.   Semicircular lac the ulnar aspect of the L thumb involving the tip of the fingernail.   Will check XR, update tdap and eval the need for sutures. -Daniel Snider PA-C Susie VIRK: sutures placed by JUAN Snider; wound care d/c instructions given.

## 2024-02-09 NOTE — ED STATDOCS - MUSCULOSKELETAL, MLM
range of motion is not limited and there is no muscle tenderness. Solaraze Pregnancy And Lactation Text: This medication is Pregnancy Category B and is considered safe. There is some data to suggest avoiding during the third trimester. It is unknown if this medication is excreted in breast milk.

## 2024-02-09 NOTE — ED STATDOCS - ATTENDING APP SHARED VISIT CONTRIBUTION OF CARE
-- continue routine  care  -- CCHD and hearing screen, bili prior to d/c I, Rafaela Richards DO,  performed the initial face to face bedside interview with this patient regarding history of present illness, review of symptoms and relevant past medical, social and family history.  I completed an independent physical examination.  I was the initial provider who evaluated this patient.   I personally saw the patient and performed a substantive portion of the visit including all aspects of the medical decision making.  I have signed out the follow up of any pending tests (i.e. labs, radiological studies) to the ACP.  I have communicated the patient’s plan of care and disposition with the ACP.  The history, relevant review of systems, past medical and surgical history, medical decision making, and physical examination was documented by the scribe in my presence and I attest to the accuracy of the documentation.

## 2024-02-09 NOTE — ED STATDOCS - SKIN, MLM
skin normal color for race, warm, dry. Less than half cm laceration semicircular through nail with less than half cm superficial laceration to the volar aspect of left thumb.

## 2024-02-09 NOTE — ED STATDOCS - PATIENT PORTAL LINK FT
You can access the FollowMyHealth Patient Portal offered by [facility] by registering at the following website: [email or web address]. By joining [facility]’s FollowMyHealth portal, you will also be able to view your health information using other applications (apps) compatible with our system.

## 2024-02-09 NOTE — ED ADULT NURSE NOTE - OBJECTIVE STATEMENT
Pt presented to the ER with injury to left thumb. Pt stated that he was cutting chicken and cut his thumb at the tip. Bleeding controlled and stitches in progress as per MD

## 2024-02-09 NOTE — ED ADULT NURSE NOTE - NSFALLUNIVINTERV_ED_ALL_ED
Bed/Stretcher in lowest position, wheels locked, appropriate side rails in place/Call bell, personal items and telephone in reach/Instruct patient to call for assistance before getting out of bed/chair/stretcher/Non-slip footwear applied when patient is off stretcher/[place] to call system/Physically safe environment - no spills, clutter or unnecessary equipment/Purposeful proactive rounding/Room/bathroom lighting operational, light cord in reach

## 2024-02-09 NOTE — ED STATDOCS - NSFOLLOWUPINSTRUCTIONS_ED_ALL_ED_FT
Laceration    WHAT YOU NEED TO KNOW:    A laceration is an injury to the skin and the soft tissue underneath it. Lacerations happen when you are cut or hit by something. They can happen anywhere on the body.     DISCHARGE INSTRUCTIONS:    Return to the emergency department if:     You have heavy bleeding or bleeding that does not stop after 10 minutes of holding firm, direct pressure over the wound.       Your wound opens up.     Contact your healthcare provider if:     You have a fever or chills.       Your laceration is red, warm, or swollen.      You have red streaks on your skin coming from your wound.      You have white or yellow drainage from the wound that smells bad.      You have pain that gets worse, even after treatment.       You have questions or concerns about your condition or care.     Medicines:     Take your medicine as directed. Contact your healthcare provider if you think your medicine is not helping or if you have side effects. Tell him or her if you are allergic to any medicine. Keep a list of the medicines, vitamins, and herbs you take. Include the amounts, and when and why you take them. Bring the list or the pill bottles to follow-up visits. Carry your medicine list with you in case of an emergency.    Care for your wound as directed:     Do not get your wound wet until your healthcare provider says it is okay. Do not soak your wound in water. Do not go swimming until your healthcare provider says it is okay. Carefully wash the wound with soap and water. Gently pat the area dry or allow it to air dry.       Change your bandages when they get wet, dirty, or after washing. Apply new, clean bandages as directed. Do not apply elastic bandages or tape too tight. Do not put powders or lotions over your incision.       Apply antibiotic ointment as directed. Your healthcare provider may give you antibiotic ointment to put over your wound if you have stitches. If you have strips of tape over your incision, let them dry up and fall off on their own. If they do not fall off within 14 days, gently remove them. If you have glue over your wound, do not remove or pick at it. If your glue comes off, do not replace it with glue that you have at home.       Check your wound every day for signs of infection such as swelling, redness, or pus.     Self-care:     Apply ice on your wound for 15 to 20 minutes every hour or as directed. Use an ice pack, or put crushed ice in a plastic bag. Cover it with a towel. Ice helps prevent tissue damage and decreases swelling and pain.      Decrease scarring of your wound by applying ointments as directed. Do not apply ointments until your healthcare provider says it is okay. You may need to wait until your wound is healed. Ask which ointment to buy and how often to use it. After your wound is healed, use sunscreen over the area when you are out in the sun. You should do this for at least 6 months to 1 year after your injury.     Follow up with your healthcare provider as directed: You may need to follow up in 24 to 48 hours to have your wound checked for infection. You will need to return in 7 days if you have stitches or staples so they can be removed. Care for your wound as directed to prevent infection and help it heal. Write down your questions so you remember to ask them during your visits.

## 2024-02-09 NOTE — ED ADULT TRIAGE NOTE - CHIEF COMPLAINT QUOTE
Pt presented to the ER with injury to left thumb. Pt stated that he was cutting chicken and cut his thumb at the tip. Bleeding controlled at this time.

## 2024-03-11 LAB
ALBUMIN SERPL ELPH-MCNC: 4.6 G/DL
ALP BLD-CCNC: 95 U/L
ALT SERPL-CCNC: 50 U/L
ANION GAP SERPL CALC-SCNC: 15 MMOL/L
AST SERPL-CCNC: 22 U/L
BILIRUB SERPL-MCNC: 0.3 MG/DL
BUN SERPL-MCNC: 16 MG/DL
CALCIUM SERPL-MCNC: 10.4 MG/DL
CHLORIDE SERPL-SCNC: 99 MMOL/L
CO2 SERPL-SCNC: 23 MMOL/L
CREAT SERPL-MCNC: 0.81 MG/DL
CRP SERPL-MCNC: 4 MG/L
EGFR: 114 ML/MIN/1.73M2
ERYTHROCYTE [SEDIMENTATION RATE] IN BLOOD BY WESTERGREN METHOD: 38 MM/HR
GLUCOSE SERPL-MCNC: 120 MG/DL
HCT VFR BLD CALC: 45 %
HGB BLD-MCNC: 14.5 G/DL
MCHC RBC-ENTMCNC: 28 PG
MCHC RBC-ENTMCNC: 32.2 GM/DL
MCV RBC AUTO: 86.9 FL
PLATELET # BLD AUTO: 381 K/UL
POTASSIUM SERPL-SCNC: 4.7 MMOL/L
PROT SERPL-MCNC: 7.1 G/DL
RBC # BLD: 5.18 M/UL
RBC # FLD: 14.7 %
SODIUM SERPL-SCNC: 136 MMOL/L
WBC # FLD AUTO: 7.25 K/UL

## 2024-03-14 RX ORDER — ABATACEPT 125 MG/ML
125 INJECTION, SOLUTION SUBCUTANEOUS
Qty: 3 | Refills: 3 | Status: ACTIVE | COMMUNITY
Start: 2023-09-24

## 2024-03-18 ENCOUNTER — NON-APPOINTMENT (OUTPATIENT)
Age: 44
End: 2024-03-18

## 2024-03-19 ENCOUNTER — RX RENEWAL (OUTPATIENT)
Age: 44
End: 2024-03-19

## 2024-03-19 RX ORDER — FOLIC ACID 1 MG/1
1 TABLET ORAL DAILY
Qty: 90 | Refills: 3 | Status: ACTIVE | COMMUNITY
Start: 2021-01-19 | End: 1900-01-01

## 2024-03-25 ENCOUNTER — APPOINTMENT (OUTPATIENT)
Dept: RHEUMATOLOGY | Facility: CLINIC | Age: 44
End: 2024-03-25
Payer: COMMERCIAL

## 2024-03-25 PROCEDURE — 99214 OFFICE O/P EST MOD 30 MIN: CPT

## 2024-03-25 RX ORDER — CYCLOBENZAPRINE HYDROCHLORIDE 10 MG/1
10 TABLET, FILM COATED ORAL
Qty: 60 | Refills: 2 | Status: ACTIVE | COMMUNITY
Start: 2023-12-15 | End: 1900-01-01

## 2024-03-25 NOTE — REVIEW OF SYSTEMS
[Feeling Tired] : feeling tired [Eye Pain] : eye pain [Dry Eyes] : dryness of the eyes [Arthralgias] : arthralgias [Joint Pain] : joint pain [Joint Stiffness] : joint stiffness [Joint Swelling] : joint swelling [Dry Skin] : dry skin [Sleep Disturbances] : sleep disturbances [Negative] : Heme/Lymph [Chills] : no chills [Fever] : no fever [Recent Weight Gain (___ Lbs)] : no recent weight gain [Recent Weight Loss (___ Lbs)] : no recent weight loss [Eyesight Problems] : no eyesight problems [Discharge From Eyes] : no purulent discharge from the eyes [Nosebleeds] : no nosebleeds [Earache] : no earache [Nasal Discharge] : no nasal discharge [Sore Throat] : no sore throat [Chest Pain] : no chest pain [Palpitations] : no palpitations [Shortness Of Breath] : no shortness of breath [Cough] : no cough [Anxiety] : no anxiety [Depression] : no depression [FreeTextEntry2] : Currently 195; no formal exercise regimen but limited activity due to flares  [FreeTextEntry3] : at times severe requiring saline gtts routinely - has not completed oph eval [FreeTextEntry4] : Recent sinus infection now resolved [FreeTextEntry9] :  Recurrent sudden pauciarticular improved dramatically but in migratory pattern --> still and takes prednisone with relief when flaring...now off actemra due to allergic reaction and doing poorly on just rasuvo w/ frequent flares  [de-identified] : WILMER confirmed, on CPAP, denies depression but endorses frustration with how pain affects his life and ability to do things [de-identified] : No rashes, no skin changes, no nail deformities and still Prior: nail abn and possible rash on R shin months ago- nothing now  [de-identified] : recurrent infections on humira, less so with Actemra d/c due to allergic reactions...recurrent infections on HD prednisone

## 2024-03-25 NOTE — HISTORY OF PRESENT ILLNESS
[FreeTextEntry1] : Patient at home, has consented to telehealth video visit today  Provider:  Juan Grant [facility] [id number]  Verbal consent given on 03/25/2024 at 08:32 by ADARSH ACHARYA, ~  ~. [place]  Updated labs 3/8/24 w/ CRP 4 (from 19), CMP w/ ALT 50 (from high of 183), CBC, ESR 38 (high 53),  COntinues Orencia / Rasuvo ... did not add Leflunomide Function improved and shorter flares.. minimal use of steroids..  Stiffness up to 60 mins routinely.  Steroids 20 mg / 10 mg daily.. the flares are NOT as bad as they used to be Still with overwhelming  Seen by ENT- dx w/ parotitits.. R side recurrent.. mild oral sicca Still taking allopurinol routinely.. no missed doses Cyclobenzaprine twice weekly..  Has been in PT recently, chiropractor..  Exercise..nothing formal beyond daily stretching..  Approved for SSDI -Still recommend referral to Dr. Perea for second opinion on inflammatory condition but not done..   - needs eye exam newly developed dry eyes (baseline eval needed still) again advised  - still no formal routine exercise regimen.. 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  Advil 200mg 2-3 tabs PRN for pain not daily use. Worse after a long day of use.  - Over the last month, 3-4 courses 20-40mg x 2-3 days for acute intermittent swelling in pauciarticular asymmetrical pattern associated with kahlil redness, tenderness and overt swelling. Shows photo of left hand redness and swelling which he regards as flares -Tolerating MTX tolerating well, Orencia 125 scq inj, recent sinus infections requiring abx. Recently course 10-day bactrim followed by zpack. +Chills w/ green mucus at time of active infection w/o fevers. Today symptoms of sinus infection fully resolved -Noticed nodule base of L 3/4. No pain and tenderness today. 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 Increased fatigue, daytime napping. Known hx Herniation in back and feet go numbness. Paresthesia in his R foot (new and in middle of w/u- MR LS reviewed 5/23 w/ diffuse DJD changes and nv entrapment worse on LLE- but RLE is where paresthesia started) and weakness in LUE (chronic severe neck issues despite Cspine ortho sx  )- chronic persistent c/o -1 year ago stopped working - ? cardiac eval completed  Denies fever, chills, rash, sob, pleuritic pain, chest pain, palpitations, abdominal pain, n/v/d. Denies MI/CVA, DVT/PE. Denies depression or anxiety but endorses frustration with pain and the effect that this has on his life, still  - did not make appt w/ Super specialist- Dr Perea...   1) Pauciarthropathy + CCP high titer:   2) Metabolic syndrome:  elevated Lipids, HbA1c and UA 8.8 3) Cervical radiculopathy s/p laminectomy 4) Gout ___________________________________________________________________________-   (Initial HPI 6/23/20)  40 yo .. w/ new onset severe pain and swelling in R 1st MTP recurrently in past month.   Immediate and nearly full resolution with steroids.  Started on Colchicine once daily, but pain/ swelling returned, another course of steroids needed.  Initial episode 1 m ago...  Hx in past 2 months sudden onset L shoulder pain/ stiffness spontaneous onset and resolution within 1 wks- MRI + subacromial bursitis..  Similar sudden onset R lateral hip pain severe for several days ROS otherwise denies inflammatory eye, back, bowel or rash. Denies renal calculi, tophi  nail deformities.  No constitutional sx and Wt stable for past 15 lbs .   FH significant for:   - gout + FH father and MGM..  - Psoriasis:  brother, 1st cousin  - T2DM both parents   PMH:   HTN mild, well controlled  On HCTZ/ lisinopril for HTN well controlled

## 2024-03-25 NOTE — PHYSICAL EXAM
[General Appearance - In No Acute Distress] : in no acute distress [General Appearance - Alert] : alert [Sclera] : the sclera and conjunctiva were normal [Nasal Cavity] : the nasal mucosa and septum were normal [Outer Ear] : the ears and nose were normal in appearance [Oropharynx] : the oropharynx was normal [Neck Appearance] : the appearance of the neck was normal [Thyroid Diffuse Enlargement] : the thyroid was not enlarged [Respiration, Rhythm And Depth] : normal respiratory rhythm and effort [Auscultation Breath Sounds / Voice Sounds] : lungs were clear to auscultation bilaterally [Heart Rate And Rhythm] : heart rate was normal and rhythm regular [Heart Sounds] : normal S1 and S2 [Heart Sounds Gallop] : no gallops [Murmurs] : no murmurs [Heart Sounds Pericardial Friction Rub] : no pericardial rub [Edema] : there was no peripheral edema [No CVA Tenderness] : no ~M costovertebral angle tenderness [Abnormal Walk] : normal gait [Nail Clubbing] : no clubbing  or cyanosis of the fingernails [Musculoskeletal - Swelling] : no joint swelling seen [Motor Tone] : muscle strength and tone were normal [] : no rash [Motor Exam] : the motor exam was normal [Oriented To Time, Place, And Person] : oriented to person, place, and time [Impaired Insight] : insight and judgment were intact [Affect] : the affect was normal [FreeTextEntry1] : moving well on video- LUE limited full ROM shoulder/ neck issues (stable/ chronic)

## 2024-03-25 NOTE — CONSULT LETTER
[Dear  ___] : Dear  [unfilled], [Courtesy Letter:] : I had the pleasure of seeing your patient, [unfilled], in my office today. [Referral Closing:] : Thank you very much for seeing this patient.  If you have any questions, please do not hesitate to contact me. [Sincerely,] : Sincerely, [DrDebi  ___] : Dr. CANAS [FreeTextEntry2] : Prasad Avila MD  [FreeTextEntry3] : Angie Leslie DNP, ANP-C Division or Rheumatology [facility]

## 2024-04-29 ENCOUNTER — OUTPATIENT (OUTPATIENT)
Dept: OUTPATIENT SERVICES | Facility: HOSPITAL | Age: 44
LOS: 1 days | End: 2024-04-29
Payer: COMMERCIAL

## 2024-04-29 ENCOUNTER — APPOINTMENT (OUTPATIENT)
Dept: ULTRASOUND IMAGING | Facility: CLINIC | Age: 44
End: 2024-04-29
Payer: COMMERCIAL

## 2024-04-29 ENCOUNTER — LABORATORY RESULT (OUTPATIENT)
Age: 44
End: 2024-04-29

## 2024-04-29 DIAGNOSIS — Z98.890 OTHER SPECIFIED POSTPROCEDURAL STATES: Chronic | ICD-10-CM

## 2024-04-29 DIAGNOSIS — M05.9 RHEUMATOID ARTHRITIS WITH RHEUMATOID FACTOR, UNSPECIFIED: ICD-10-CM

## 2024-04-29 DIAGNOSIS — K11.20 SIALOADENITIS, UNSPECIFIED: ICD-10-CM

## 2024-04-29 PROCEDURE — 76536 US EXAM OF HEAD AND NECK: CPT | Mod: 26

## 2024-04-29 PROCEDURE — 76536 US EXAM OF HEAD AND NECK: CPT

## 2024-04-30 LAB
25(OH)D3 SERPL-MCNC: 26.8 NG/ML
ALBUMIN SERPL ELPH-MCNC: 4.3 G/DL
ALBUMIN SERPL ELPH-MCNC: 4.4 G/DL
ALP BLD-CCNC: 92 U/L
ALP BLD-CCNC: 95 U/L
ALT SERPL-CCNC: 31 U/L
ALT SERPL-CCNC: 34 U/L
ANION GAP SERPL CALC-SCNC: 14 MMOL/L
ANION GAP SERPL CALC-SCNC: 15 MMOL/L
APPEARANCE: CLEAR
AST SERPL-CCNC: 18 U/L
AST SERPL-CCNC: 19 U/L
BILIRUB SERPL-MCNC: 0.2 MG/DL
BILIRUB SERPL-MCNC: 0.3 MG/DL
BILIRUBIN URINE: NEGATIVE
BLOOD URINE: NEGATIVE
BUN SERPL-MCNC: 14 MG/DL
BUN SERPL-MCNC: 14 MG/DL
CALCIUM SERPL-MCNC: 9.6 MG/DL
CALCIUM SERPL-MCNC: 9.6 MG/DL
CHLORIDE SERPL-SCNC: 102 MMOL/L
CHLORIDE SERPL-SCNC: 102 MMOL/L
CK SERPL-CCNC: 61 U/L
CO2 SERPL-SCNC: 22 MMOL/L
CO2 SERPL-SCNC: 23 MMOL/L
COLOR: YELLOW
CREAT SERPL-MCNC: 1.16 MG/DL
CREAT SERPL-MCNC: 1.21 MG/DL
CRP SERPL-MCNC: 5 MG/L
EGFR: 77 ML/MIN/1.73M2
EGFR: 81 ML/MIN/1.73M2
ERYTHROCYTE [SEDIMENTATION RATE] IN BLOOD BY WESTERGREN METHOD: 38 MM/HR
ERYTHROCYTE [SEDIMENTATION RATE] IN BLOOD BY WESTERGREN METHOD: 50 MM/HR
GLUCOSE QUALITATIVE U: NEGATIVE MG/DL
GLUCOSE SERPL-MCNC: 114 MG/DL
GLUCOSE SERPL-MCNC: 120 MG/DL
HCT VFR BLD CALC: 40.2 %
HCT VFR BLD CALC: 41.4 %
HGB BLD-MCNC: 13.6 G/DL
HGB BLD-MCNC: 13.7 G/DL
KETONES URINE: NEGATIVE MG/DL
LEUKOCYTE ESTERASE URINE: NEGATIVE
MCHC RBC-ENTMCNC: 28.7 PG
MCHC RBC-ENTMCNC: 28.8 PG
MCHC RBC-ENTMCNC: 32.9 GM/DL
MCHC RBC-ENTMCNC: 34.1 GM/DL
MCV RBC AUTO: 84.5 FL
MCV RBC AUTO: 87.3 FL
NITRITE URINE: NEGATIVE
PH URINE: 6
PLATELET # BLD AUTO: 331 K/UL
PLATELET # BLD AUTO: 331 K/UL
POTASSIUM SERPL-SCNC: 4.1 MMOL/L
POTASSIUM SERPL-SCNC: 4.1 MMOL/L
PROT SERPL-MCNC: 6.5 G/DL
PROT SERPL-MCNC: 7 G/DL
PROTEIN URINE: NEGATIVE MG/DL
RBC # BLD: 4.74 M/UL
RBC # BLD: 4.76 M/UL
RBC # FLD: 14.1 %
RBC # FLD: 14.2 %
SODIUM SERPL-SCNC: 138 MMOL/L
SODIUM SERPL-SCNC: 139 MMOL/L
SPECIFIC GRAVITY URINE: 1.02
TSH SERPL-ACNC: 2.41 UIU/ML
URATE SERPL-MCNC: 5 MG/DL
UROBILINOGEN URINE: 0.2 MG/DL
WBC # FLD AUTO: 7.51 K/UL
WBC # FLD AUTO: 7.55 K/UL

## 2024-05-13 LAB
24R-OH-CALCIDIOL SERPL-MCNC: 28.4 PG/ML
ACE BLD-CCNC: 12 U/L
ALBUMIN MFR SERPL ELPH: 62.2 %
ALBUMIN SERPL-MCNC: 4.4 G/DL
ALBUMIN/GLOB SERPL: 1.7 RATIO
ALPHA1 GLOB MFR SERPL ELPH: 4 %
ALPHA1 GLOB SERPL ELPH-MCNC: 0.3 G/DL
ALPHA2 GLOB MFR SERPL ELPH: 9.6 %
ALPHA2 GLOB SERPL ELPH-MCNC: 0.7 G/DL
ANTI-BETA2 GLYCOPROTEIN 1 IGG CONCENTRATION: 1 U/ML
ANTI-BETA2 GLYCOPROTEIN 1 IGM CONCENTRATION: <2.4 U/ML
ANTI-CARDIOLIPIN IGG CONCENTRATION: 0 GPL
ANTI-CARDIOLIPIN IGM CONCENTRATION: <0.9 MPL
ANTI-CENP IGG CONCENTRATION: <0.4 U/ML
ANTI-CYCLIC CITRULLINATED PEPTIDE IGG CONCENTRATION: >340 U/ML
ANTI-DOUBLE-STRANDED DNA IGG CONCENTRATION: 14 IU/ML
ANTI-JO-1 IGG CONCENTRATION: <0.3 U/ML
ANTI-NUCLEAR ANTIBODIES - CYTOPLASMIC PATTERN: NORMAL
ANTI-NUCLEAR ANTIBODIES - PRIMARY NUCLEAR PATTERN: NORMAL
ANTI-NUCLEAR ANTIBODIES - PRIMARY PATTERN TITER: ABNORMAL
ANTI-NUCLEAR ANTIBODIES IGG CONCENTRATION: 17 UNITS
ANTI-RNA POL III IGG CONCENTRATION: <0.7 U/ML
ANTI-RNP70 IGG CONCENTRATION: 0 U/ML
ANTI-RO52 IGG CONCENTRATION: 0 U/ML
ANTI-RO60 IGG CONCENTRATION: <0.4 U/ML
ANTI-SCL-70 IGG CONCENTRATION: <0.6 U/ML
ANTI-SMITH IGG CONCENTRATION: <0.7 U/ML
ANTI-SS-B (LA) IGG CONCENTRATION: <0.4 U/ML
ANTI-THYROGLOBULIN IGG CONCENTRATION: <12 IU/ML
ANTI-THYROID PEROXIDASE IGG CONCENTRATION: <4 IU/ML
ANTI-U1RNP IGG CONCENTRATION: 1 U/ML
AVISE LUPUS INDEX: -5
AVISE LUPUS RESULT: NEGATIVE
B-GLOBULIN MFR SERPL ELPH: 13.9 %
B-GLOBULIN SERPL ELPH-MCNC: 1 G/DL
B-LYMPHOCYTE-BOUND C4D (BC4D) LEVEL: 11
C3 SERPL-MCNC: 152 MG/DL
C4 SERPL-MCNC: 26 MG/DL
ERYTHROCYTE-BOUND C4D (EC4D) LEVEL: 3
GAMMA GLOB FLD ELPH-MCNC: 0.7 G/DL
GAMMA GLOB MFR SERPL ELPH: 10.3 %
IGG SER QL IEP: 721 MG/DL
IGG1 SER-MCNC: 529 MG/DL
IGG2 SER-MCNC: 121 MG/DL
IGG3 SER-MCNC: 38.4 MG/DL
IGG4 SER-MCNC: 17.1 MG/DL
INTERPRETATION SERPL IEP-IMP: NORMAL
MYELOPEROXIDASE AB SER QL IA: <5 UNITS
MYELOPEROXIDASE CELLS FLD QL: NEGATIVE
PROT SERPL-MCNC: 7 G/DL
PROT SERPL-MCNC: 7 G/DL
PROTEINASE3 AB SER IA-ACNC: <5 UNITS
PROTEINASE3 AB SER-ACNC: NEGATIVE
RHEUMATOID FACTOR (IGA) CONCENTRATION: 26 IU/ML
RHEUMATOID FACTOR (IGM) CONCENTRATION: 19 IU/ML

## 2024-05-28 ENCOUNTER — APPOINTMENT (OUTPATIENT)
Dept: RHEUMATOLOGY | Facility: CLINIC | Age: 44
End: 2024-05-28
Payer: COMMERCIAL

## 2024-05-28 VITALS
HEIGHT: 68 IN | SYSTOLIC BLOOD PRESSURE: 118 MMHG | HEART RATE: 78 BPM | BODY MASS INDEX: 29.55 KG/M2 | OXYGEN SATURATION: 98 % | WEIGHT: 195 LBS | DIASTOLIC BLOOD PRESSURE: 72 MMHG | TEMPERATURE: 98 F

## 2024-05-28 DIAGNOSIS — M05.9 RHEUMATOID ARTHRITIS WITH RHEUMATOID FACTOR, UNSPECIFIED: ICD-10-CM

## 2024-05-28 DIAGNOSIS — M10.9 GOUT, UNSPECIFIED: ICD-10-CM

## 2024-05-28 DIAGNOSIS — K11.20 SIALOADENITIS, UNSPECIFIED: ICD-10-CM

## 2024-05-28 PROCEDURE — 99214 OFFICE O/P EST MOD 30 MIN: CPT

## 2024-05-28 PROCEDURE — G2211 COMPLEX E/M VISIT ADD ON: CPT | Mod: NC

## 2024-05-28 RX ORDER — ALLOPURINOL 300 MG/1
300 TABLET ORAL DAILY
Qty: 90 | Refills: 3 | Status: ACTIVE | COMMUNITY
Start: 2021-11-26 | End: 1900-01-01

## 2024-05-28 RX ORDER — LEFLUNOMIDE 10 MG/1
10 TABLET, FILM COATED ORAL DAILY
Qty: 90 | Refills: 1 | Status: ACTIVE | COMMUNITY
Start: 2023-12-15 | End: 1900-01-01

## 2024-05-28 RX ORDER — PREDNISONE 5 MG/1
5 TABLET ORAL
Qty: 90 | Refills: 1 | Status: ACTIVE | COMMUNITY
Start: 2021-09-24 | End: 1900-01-01

## 2024-05-28 RX ORDER — METHOTREXATE 15 MG/.3ML
15 INJECTION, SOLUTION SUBCUTANEOUS
Qty: 3 | Refills: 3 | Status: ACTIVE | COMMUNITY
Start: 2021-04-28 | End: 1900-01-01

## 2024-05-28 RX ORDER — COLCHICINE 0.6 MG/1
0.6 TABLET ORAL
Qty: 180 | Refills: 1 | Status: ACTIVE | COMMUNITY
Start: 2021-11-26 | End: 1900-01-01

## 2024-05-28 RX ORDER — CEVIMELINE HYDROCHLORIDE 30 MG/1
30 CAPSULE ORAL
Qty: 90 | Refills: 2 | Status: ACTIVE | COMMUNITY
Start: 2024-05-28 | End: 1900-01-01

## 2024-05-28 NOTE — CONSULT LETTER
[Dear  ___] : Dear  [unfilled], [Courtesy Letter:] : I had the pleasure of seeing your patient, [unfilled], in my office today. [Referral Closing:] : Thank you very much for seeing this patient.  If you have any questions, please do not hesitate to contact me. [Sincerely,] : Sincerely, [DrDebi  ___] : Dr. CANAS [FreeTextEntry2] : Prasad Avila MD  [FreeTextEntry3] : Angie Leslie DNP, ANP-[facility] Division or Rheumatology [facility]

## 2024-05-28 NOTE — DATA REVIEWED
[FreeTextEntry1] : Labs:  4/24  nl CBC, CMP (nFBS 120), SPEP, CRP, ESr 30, uric acid 5.0,  AVISE (low + SWAPNIL w/ CCP high),, ANCA/ P/C and MPO/ PR3.  6/20 RF 28, CCP > 250, ALT 59 (known NAFLD), UA 8.8, nl CRP, UA, ESR 20. Neg HLAB27, Covid HbA1c 5.9- Uric acid 8.8  , HD 45, ,   Diagnostics:  MRI L shoulder 6/20 no evidence of RTC pathology and nl GH/ AC joint w/ no effusions.   MRI L shoulder 1/17/2023: degenerative changes at AC joint , no effusions or erosions, RT cuffs intact w/o tears  MRI Thoracic 1/17/2023: multilevel intervertebral disc dessication. 3mm central disc protrusion at T4-T5. No neural foraminal narrowing, no spinal canal stenosis   MRI Lumbar Spine 1/17/2023:  L3-L4 3mm broad based left foraminal zone disc protrusion. Left neural foraminal narrowing. No spinal canal stenosis L4-L5: 3mm circumferential disc bulge. bl neural foraminal narrowing. No spinal canal stenosis. Bl facet aropathy L5-S1: bl facet joint arthrosis

## 2024-05-28 NOTE — REVIEW OF SYSTEMS
[Feeling Tired] : feeling tired [Dry Eyes] : dryness of the eyes [Arthralgias] : arthralgias [Joint Pain] : joint pain [Joint Swelling] : joint swelling [Joint Stiffness] : joint stiffness [Dry Skin] : dry skin [Sleep Disturbances] : sleep disturbances [Negative] : Heme/Lymph [As Noted in HPI] : as noted in HPI [Fever] : no fever [Chills] : no chills [Recent Weight Gain (___ Lbs)] : no recent weight gain [Recent Weight Loss (___ Lbs)] : no recent weight loss [Eyesight Problems] : no eyesight problems [Discharge From Eyes] : no purulent discharge from the eyes [Earache] : no earache [Nosebleeds] : no nosebleeds [Nasal Discharge] : no nasal discharge [Sore Throat] : no sore throat [Chest Pain] : no chest pain [Palpitations] : no palpitations [Shortness Of Breath] : no shortness of breath [Cough] : no cough [Anxiety] : no anxiety [Depression] : no depression [FreeTextEntry2] : Currently 195; no formal exercise regimen but limited activity due to flares  [FreeTextEntry3] : at times severe requiring saline gtts routinely - has not completed oph eval [FreeTextEntry4] : 3 episodes of parotid enlargement since his last visit  [FreeTextEntry9] :  Recurrent sudden pauciarticular improved dramatically but in migratory pattern --> still and takes prednisone with relief when flaring...now off actemra due to allergic reaction and doing poorly on just rasuvo w/ frequent flares and has not noticed much improvement on 10 mg of Leflunomide [de-identified] : No rashes, no skin changes, no nail deformities and still Prior: nail abn and possible rash on R shin months ago- nothing now  [de-identified] : WILMER confirmed, on CPAP, denies depression but endorses frustration with how pain affects his life and ability to do things [de-identified] : recurrent infections on humira, less so with Actemra d/c due to allergic reactions...recurrent infections on HD prednisone

## 2024-05-28 NOTE — HISTORY OF PRESENT ILLNESS
[FreeTextEntry1] : 5/28/2024  - he still gets bl parotid enlargements and reports it lasts about 3-4 days if he does nothing to treat it - reports having 3 episodes since his last visit 3/2024 - reports he has noticed about 10 episodes of RA flares since his last visit 3/2024.....gets noticeable swelling in his hands when he uses them a lot and describes it as migratory inflammation from digit to digit  - morning stiffness last about 30 mins each morning w/ noticeable heel pain.....also has some bl shoulder pain and limited ROM - he takes prednisone for 1-2 days to manage both his RA flares and parotid enlargement  - on leflunomide 10 mg and 15 mg of MTX daily.....has not noticed a difference since starting on the leflunomide 3 months ago  - has eye dryness intermittently and describes it as a gritty feeling w/ redness throughout - he also has intermittently severe oral dryness - his sinuses have been good s/p surgery   - still on colchicine daily - prednisone 5 mg PRN - orencia 125 mg/ml  Approved for SSDI  - needs eye exam newly developed dry eyes (baseline eval needed still) again advised  - still no formal routine exercise regimen.. 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  Advil 200mg 2-3 tabs PRN for pain not daily use. Worse after a long day of use.  - Over the last month, 3-4 courses 20-40mg x 2-3 days for acute intermittent swelling in pauciarticular asymmetrical pattern associated with kahlil redness, tenderness and overt swelling. Shows photo of left hand redness and swelling which he regards as flares -Tolerating MTX tolerating well, Orencia 125 scq inj, recent sinus infections requiring abx. Recently course 10-day bactrim followed by zpack. +Chills w/ green mucus at time of active infection w/o fevers. Today symptoms of sinus infection fully resolved -Noticed nodule base of L 3/4. No pain and tenderness today. 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 Increased fatigue, daytime napping. Known hx Herniation in back and feet go numbness. Paresthesia in his R foot (new and in middle of w/u- MR LS reviewed 5/23 w/ diffuse DJD changes and nv entrapment worse on LLE- but RLE is where paresthesia started) and weakness in LUE (chronic severe neck issues despite Cspine ortho sx  )- chronic persistent c/o -1 year ago stopped working - ? cardiac eval completed  Denies fever, chills, rash, sob, pleuritic pain, chest pain, palpitations, abdominal pain, n/v/d. Denies MI/CVA, DVT/PE. Denies depression or anxiety but endorses frustration with pain and the effect that this has on his life, still  - did not make appt w/ Super specialist- Dr Perea...   1) Pauciarthropathy + CCP high titer:   2) Metabolic syndrome:  elevated Lipids, HbA1c and UA 8.8 3) Cervical radiculopathy s/p laminectomy 4) Gout ___________________________________________________________________________-   (Initial HPI 6/23/20)  40 yo .. w/ new onset severe pain and swelling in R 1st MTP recurrently in past month.   Immediate and nearly full resolution with steroids.  Started on Colchicine once daily, but pain/ swelling returned, another course of steroids needed.  Initial episode 1 m ago...  Hx in past 2 months sudden onset L shoulder pain/ stiffness spontaneous onset and resolution within 1 wks- MRI + subacromial bursitis..  Similar sudden onset R lateral hip pain severe for several days ROS otherwise denies inflammatory eye, back, bowel or rash. Denies renal calculi, tophi  nail deformities.  No constitutional sx and Wt stable for past 15 lbs .   FH significant for:   - gout + FH father and MGM..  - Psoriasis:  brother, 1st cousin  - T2DM both parents   PMH:   HTN mild, well controlled  On HCTZ/ lisinopril for HTN well controlled

## 2024-05-28 NOTE — PHYSICAL EXAM
[General Appearance - Alert] : alert [General Appearance - In No Acute Distress] : in no acute distress [Neck Appearance] : the appearance of the neck was normal [Thyroid Diffuse Enlargement] : the thyroid was not enlarged [Motor Exam] : the motor exam was normal [Oriented To Time, Place, And Person] : oriented to person, place, and time [Impaired Insight] : insight and judgment were intact [Affect] : the affect was normal [Cervical Lymph Nodes Enlarged Posterior Bilaterally] : posterior cervical [Cervical Lymph Nodes Enlarged Anterior Bilaterally] : anterior cervical [Supraclavicular Lymph Nodes Enlarged Bilaterally] : supraclavicular [] : no respiratory distress [Auscultation Breath Sounds / Voice Sounds] : lungs were clear to auscultation bilaterally [Heart Rate And Rhythm] : heart rate was normal and rhythm regular [Heart Sounds] : normal S1 and S2 [Heart Sounds Gallop] : no gallops [Murmurs] : no murmurs [Heart Sounds Pericardial Friction Rub] : no pericardial rub [Edema] : there was no peripheral edema [Axillary Lymph Nodes Enlarged Bilaterally] : axillary [FreeTextEntry1] : no active synovitis on eval despite complaints; previously: (R3 PIP dactylitis image on phone).  R shoulder nearly fROM - no POM, L limited at 90degrees- mild POM but weakness - chronically

## 2024-05-30 ENCOUNTER — TRANSCRIPTION ENCOUNTER (OUTPATIENT)
Age: 44
End: 2024-05-30

## 2024-07-09 ENCOUNTER — NON-APPOINTMENT (OUTPATIENT)
Age: 44
End: 2024-07-09

## 2024-09-11 NOTE — ASSESSMENT
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 slowly improving w/ return of strength - 5/23 sinus surgery for recurrent infections.. - 9/23 comprehensive cardiac eval (Dr Luque:  nl Carotids (11/23); TTE (10/23) nl; ECG (9/23)nl  1) Inflammatory arthropathy- seropositive RA + RF 40, CCP > 250, neg HLAB27 and nl SI joints (? psoriatic patch ys ago), NO erosions Presentation is always in asymmetrical pauciarticular pattern (still)  Currently taking Rasuvo 15mg and Orencia weekly injections (restarted 10/23).  Still intermittent need for oral steroids 20 mg x 1 day- then 10 mg x 1 day with full resolution- reportedly every 2-4 wks.  Also now reporting recurrent parotid enlargement- seen and confirmed by ENT. Unclear if this is diffuse swelling of gland or stones.. denies considerable sicca.. previous w/ u for SS negative several ys ago. No obvious synovitis on exam- video today. Still reports minimizing work even around the house 2/2 fear of flare.. recurrently when "does too much"..  Now on disability from [employer] and SSDI (approved 2/24)  Was suppposed to start LEF 10 mg low dose but too $$.. will try to get more affordable.. but if not may consider adding HCQ.. especially if antibodies +.  Will get US head/ neck .. assess for glandular enlargement, lymphadenopathy and possible stones.   Partially controlled disease requiring additional medication adjustments limited by NAFLD.. elevated LFTs- better lately with ALT 50 and nl AST 3/24  Still no active psoriasis and recent possible inflammatory eye disease.   Still needs to see opth- symptoms resolved now has not seen opth.. continues to be controlled at this time with no return Repeated infections over past few ys. Recent sinus infections slightly better following sx and decreased use of steroids at higher doses- never takes > 20 mg now and not for more than 1day.  Repeatedly emphasized repeatedly need to minimize but as noted still has intermittent flares.   Still would like to consider Nicki but concerned about BB warning in setting of metabolic syndrome:  .working cardiology in past Dr Luque. CV events before starting given overweight/ HLD, preDM, WILMER, and NAFLD. Hasn't been able to exercise 2/2 pain... and though better is still not c/w cardio regimen.   -Chronic fatigue:  always.. Did sleep better  with 10 mg but not taken consistently.. encouraged to try for 1-2 m and see if generalized fatigue improves.   Again lengthy discussion regarding potential change in tx.. will try adding 10mg leflunomide (work with pharm) to MTX and Orencia..  - Will require close monitoring monthly at least initially 2/2   NAFLD with elevated ALT at baseline, previously elevated nl 10/23...(in past) AST max 69-> nl now and ALT max 156-> now 77 w/ nl bili NOTE: - full cardiac w/u 11/23 NEG - Actemra recently after 3-4 doses- experienced severe allergic reaction with diffuse hives "everywhere".. better now, unable to tell if this was effective, did experience 2 serious infections while taking..  - Initial tx: Started Humira 10/20 and added MTX with intermittent flares every few months still in monoarticular pattern... L foot/ achilles (most recurrently)...never achieved remission -Stopped Humira 4/2023 and started Actemra due to recurrent sinusitis -Elevated UA with +DECT started on allopurinol with normal UA now (< 6.0) believe gout is better controlled now and feels like flares are RA vs PsA - SSA in past initially tolerated then developed Nausea..and inadequate response - FH significant for both Gout and PsA.. - No personal of, AS, inflammatory back, bowel or eye dz Denies renal calculi  Discussion -Patient remains frustrated with flares  hasn't achieved remission in past 2 ys.. and feels flares are triggered by physical activity so has markedly curtailed routine household chores..Has also stopped working - now on disability from [employer] and SSDI  - still doesn't want to come in for infusions ??? Would like consultation with Dr Perea to see if there is any  more we can do, has not made appt ... 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 - HTN: well controlled on treatment /80 - overweight BMI 28-> 31 now w/ steroids.-> 28 -->29, frequent use of steroids - NAFLD: persistently elevated ALT 45-50 -> LFTs fine 1/21 -> 3/21 nl --> 1/23 ALT 47 --> 5/23 ALT 77 - HLD: 1/23 , , HD 54,  -> slightly improved from prior.. needs to d/w PCP - HbA1c 5.9 -> 5.8-> 5.5 -->6.1 - Confirmed WILMER: now on CPAP but little improvement, struggling with tolerating..working w/ ENT provider  3) radiculopathy/ myelopathy: neither acute at this time, chronic LUE limited ROM though Cervical: severe HNP at C 4-5 - 5-6 with LUE paresthesias/ weakness, sx indicated. Better following sx.. paresthesias nearly fully resolved but weak  str persists - Updated eval with persistent LUE weakness working with neurosx LS; diffuse DJD throughout but no severe nv foraminal / or central stenosis and nothing that matches paresthesias worse on LLE (compression noted R) as described..  -Had MR T-L spine with mild-moderate pathology - see data. Could benefit from epidurals especially to LS, encouraged patient to follow up about this -Not interested at this time in starting gabapentin but could consider in future if needed on a PRN basis  4) Lifestyle Change/ frustration -Denies overt depression or anxiety but endorses frustration with new lifestyle limitations, skilled nursing from work, and decreased energy to care and be with family and friends -Not willing to start medication for depression now, doesn't want to be on something daily but would be a good candidate for Cymbalta or Wellbutrin if willing to reconsider in future, discussed loss and normal response..    Plan: - complete labs prior to next visit.. should be done monthly if start LEF   -start leflunomide 10mg daily  (if cost reduced) and continue MTX 15 mg wkly   - US head/neck, ideally when flaring but if not before next visit do study prior to May appt  -Still recommend referral to Dr. Perea for second opinion on inflammatory condition  -encouraged to consider nightly Cyclobenzaprine 10mg bedtime, can decrease to 5mg if too sedating.  Highly recommend routine use to improve quality of sleep and continue to work with pulm for CPAP  - Minimize prednisone use repeatedly reviewed AE of frequent HD prednisone use (infections/ AVN, metabolic syndrome as noted). Contact via text if experiencing flare and we will discuss prednisone from there (repeatedly advised but not done)- NMT 20 mg x 1 day then decrease and no need to taper.. continue 20 mg x 1, 10 mg x 1 for flares- has been effective ..   - Folic acid 1-3 mg daily on all the days you don't take MTX. Start at 1 mg and increase in having any SE.  - needs eye exam newly developed dry eyes (baseline eval needed still) again advised   - Continue w/ 300 mg allopurinol... TRY NOT to miss doses because that's what triggers a flare.. (usually very well tolerated)  - fatigue: you need to continue to address the poor sleep. Continue to work w/ ENT to adjust.. and WILMER treatment/ CPAP..  - needs routine exercise regimen.. conditioning- may need to start with PT..has not been willing to consider. Did work with PT.. now needs home regimen.. need description...   - mnthly labs for at least 3-6 m on combined MTX/ LEF treatment (if started)  - RTO every 2 m till remission.  Still recommend appt with Dr Perea .   Mid Thoracic and lumbar scar of surgery/location

## 2024-09-19 ENCOUNTER — RX RENEWAL (OUTPATIENT)
Age: 44
End: 2024-09-19

## 2024-10-25 ENCOUNTER — NON-APPOINTMENT (OUTPATIENT)
Age: 44
End: 2024-10-25

## 2024-10-25 ENCOUNTER — APPOINTMENT (OUTPATIENT)
Dept: RHEUMATOLOGY | Facility: CLINIC | Age: 44
End: 2024-10-25

## 2024-10-25 VITALS
DIASTOLIC BLOOD PRESSURE: 78 MMHG | HEART RATE: 88 BPM | OXYGEN SATURATION: 96 % | SYSTOLIC BLOOD PRESSURE: 104 MMHG | TEMPERATURE: 96.6 F | HEIGHT: 68 IN | WEIGHT: 204 LBS | BODY MASS INDEX: 30.92 KG/M2

## 2024-10-25 DIAGNOSIS — M05.9 RHEUMATOID ARTHRITIS WITH RHEUMATOID FACTOR, UNSPECIFIED: ICD-10-CM

## 2024-10-25 DIAGNOSIS — K11.20 SIALOADENITIS, UNSPECIFIED: ICD-10-CM

## 2024-10-25 DIAGNOSIS — M25.562 PAIN IN RIGHT KNEE: ICD-10-CM

## 2024-10-25 DIAGNOSIS — L40.50 ARTHROPATHIC PSORIASIS, UNSPECIFIED: ICD-10-CM

## 2024-10-25 DIAGNOSIS — M25.561 PAIN IN RIGHT KNEE: ICD-10-CM

## 2024-10-25 DIAGNOSIS — M10.9 GOUT, UNSPECIFIED: ICD-10-CM

## 2024-10-25 PROCEDURE — G2211 COMPLEX E/M VISIT ADD ON: CPT | Mod: NC

## 2024-10-25 PROCEDURE — 99214 OFFICE O/P EST MOD 30 MIN: CPT

## 2024-10-29 LAB
ALBUMIN SERPL ELPH-MCNC: 4.5 G/DL
ALP BLD-CCNC: 99 U/L
ALT SERPL-CCNC: 52 U/L
ANION GAP SERPL CALC-SCNC: 16 MMOL/L
APPEARANCE: CLEAR
AST SERPL-CCNC: 29 U/L
BASOPHILS # BLD AUTO: 0.04 K/UL
BASOPHILS NFR BLD AUTO: 0.6 %
BILIRUB SERPL-MCNC: 0.4 MG/DL
BILIRUBIN URINE: NEGATIVE
BLOOD URINE: NEGATIVE
BUN SERPL-MCNC: 12 MG/DL
CALCIUM SERPL-MCNC: 10 MG/DL
CHLORIDE SERPL-SCNC: 97 MMOL/L
CO2 SERPL-SCNC: 23 MMOL/L
COLOR: YELLOW
CREAT SERPL-MCNC: 0.86 MG/DL
CRP SERPL-MCNC: 5 MG/L
EGFR: 111 ML/MIN/1.73M2
EOSINOPHIL # BLD AUTO: 0.06 K/UL
EOSINOPHIL NFR BLD AUTO: 0.9 %
ERYTHROCYTE [SEDIMENTATION RATE] IN BLOOD BY WESTERGREN METHOD: 41 MM/HR
GLUCOSE QUALITATIVE U: NEGATIVE MG/DL
GLUCOSE SERPL-MCNC: 171 MG/DL
HCT VFR BLD CALC: 45.5 %
HGB BLD-MCNC: 14.5 G/DL
IMM GRANULOCYTES NFR BLD AUTO: 0.9 %
KETONES URINE: NEGATIVE MG/DL
LEUKOCYTE ESTERASE URINE: NEGATIVE
LYMPHOCYTES # BLD AUTO: 0.86 K/UL
LYMPHOCYTES NFR BLD AUTO: 12.9 %
MAN DIFF?: NORMAL
MCHC RBC-ENTMCNC: 28.1 PG
MCHC RBC-ENTMCNC: 31.9 GM/DL
MCV RBC AUTO: 88.2 FL
MONOCYTES # BLD AUTO: 0.27 K/UL
MONOCYTES NFR BLD AUTO: 4.1 %
NEUTROPHILS # BLD AUTO: 5.37 K/UL
NEUTROPHILS NFR BLD AUTO: 80.6 %
NITRITE URINE: NEGATIVE
PH URINE: 6.5
PLATELET # BLD AUTO: 341 K/UL
POTASSIUM SERPL-SCNC: 4.7 MMOL/L
PROT SERPL-MCNC: 7 G/DL
PROTEIN URINE: NEGATIVE MG/DL
RBC # BLD: 5.16 M/UL
RBC # FLD: 13.8 %
SODIUM SERPL-SCNC: 136 MMOL/L
SPECIFIC GRAVITY URINE: 1.02
URATE SERPL-MCNC: 4.3 MG/DL
UROBILINOGEN URINE: 0.2 MG/DL
WBC # FLD AUTO: 6.66 K/UL

## 2024-11-07 ENCOUNTER — APPOINTMENT (OUTPATIENT)
Dept: RADIOLOGY | Facility: CLINIC | Age: 44
End: 2024-11-07
Payer: COMMERCIAL

## 2024-11-07 ENCOUNTER — OUTPATIENT (OUTPATIENT)
Dept: OUTPATIENT SERVICES | Facility: HOSPITAL | Age: 44
LOS: 1 days | End: 2024-11-07
Payer: COMMERCIAL

## 2024-11-07 DIAGNOSIS — Z98.890 OTHER SPECIFIED POSTPROCEDURAL STATES: Chronic | ICD-10-CM

## 2024-11-07 DIAGNOSIS — M25.561 PAIN IN RIGHT KNEE: ICD-10-CM

## 2024-11-07 PROCEDURE — 73564 X-RAY EXAM KNEE 4 OR MORE: CPT

## 2024-11-07 PROCEDURE — 73564 X-RAY EXAM KNEE 4 OR MORE: CPT | Mod: 26,50

## 2024-11-16 ENCOUNTER — EMERGENCY (EMERGENCY)
Facility: HOSPITAL | Age: 44
LOS: 0 days | Discharge: HOME | End: 2024-11-16
Attending: EMERGENCY MEDICINE
Payer: COMMERCIAL

## 2024-11-16 VITALS
TEMPERATURE: 98 F | DIASTOLIC BLOOD PRESSURE: 74 MMHG | RESPIRATION RATE: 18 BRPM | SYSTOLIC BLOOD PRESSURE: 113 MMHG | OXYGEN SATURATION: 95 % | HEART RATE: 98 BPM

## 2024-11-16 VITALS — HEIGHT: 68 IN | WEIGHT: 199.96 LBS

## 2024-11-16 DIAGNOSIS — I10 ESSENTIAL (PRIMARY) HYPERTENSION: ICD-10-CM

## 2024-11-16 DIAGNOSIS — R10.13 EPIGASTRIC PAIN: ICD-10-CM

## 2024-11-16 DIAGNOSIS — K21.9 GASTRO-ESOPHAGEAL REFLUX DISEASE WITHOUT ESOPHAGITIS: ICD-10-CM

## 2024-11-16 DIAGNOSIS — M06.9 RHEUMATOID ARTHRITIS, UNSPECIFIED: ICD-10-CM

## 2024-11-16 DIAGNOSIS — R16.0 HEPATOMEGALY, NOT ELSEWHERE CLASSIFIED: ICD-10-CM

## 2024-11-16 DIAGNOSIS — K76.0 FATTY (CHANGE OF) LIVER, NOT ELSEWHERE CLASSIFIED: ICD-10-CM

## 2024-11-16 DIAGNOSIS — Z98.890 OTHER SPECIFIED POSTPROCEDURAL STATES: Chronic | ICD-10-CM

## 2024-11-16 LAB
ALBUMIN SERPL ELPH-MCNC: 3.9 G/DL — SIGNIFICANT CHANGE UP (ref 3.3–5)
ALP SERPL-CCNC: 80 U/L — SIGNIFICANT CHANGE UP (ref 40–120)
ALT FLD-CCNC: 60 U/L — SIGNIFICANT CHANGE UP (ref 12–78)
ANION GAP SERPL CALC-SCNC: 2 MMOL/L — LOW (ref 5–17)
AST SERPL-CCNC: 19 U/L — SIGNIFICANT CHANGE UP (ref 15–37)
BASOPHILS # BLD AUTO: 0.05 K/UL — SIGNIFICANT CHANGE UP (ref 0–0.2)
BASOPHILS NFR BLD AUTO: 0.6 % — SIGNIFICANT CHANGE UP (ref 0–2)
BILIRUB SERPL-MCNC: 0.3 MG/DL — SIGNIFICANT CHANGE UP (ref 0.2–1.2)
BUN SERPL-MCNC: 13 MG/DL — SIGNIFICANT CHANGE UP (ref 7–23)
CALCIUM SERPL-MCNC: 8.9 MG/DL — SIGNIFICANT CHANGE UP (ref 8.5–10.1)
CHLORIDE SERPL-SCNC: 104 MMOL/L — SIGNIFICANT CHANGE UP (ref 96–108)
CO2 SERPL-SCNC: 29 MMOL/L — SIGNIFICANT CHANGE UP (ref 22–31)
CREAT SERPL-MCNC: 0.91 MG/DL — SIGNIFICANT CHANGE UP (ref 0.5–1.3)
EGFR: 108 ML/MIN/1.73M2 — SIGNIFICANT CHANGE UP
EGFR: 108 ML/MIN/1.73M2 — SIGNIFICANT CHANGE UP
EOSINOPHIL # BLD AUTO: 0.18 K/UL — SIGNIFICANT CHANGE UP (ref 0–0.5)
EOSINOPHIL NFR BLD AUTO: 2.1 % — SIGNIFICANT CHANGE UP (ref 0–6)
GLUCOSE SERPL-MCNC: 94 MG/DL — SIGNIFICANT CHANGE UP (ref 70–99)
HCT VFR BLD CALC: 42.3 % — SIGNIFICANT CHANGE UP (ref 39–50)
HGB BLD-MCNC: 13.7 G/DL — SIGNIFICANT CHANGE UP (ref 13–17)
IMM GRANULOCYTES NFR BLD AUTO: 0.6 % — SIGNIFICANT CHANGE UP (ref 0–0.9)
LIDOCAIN IGE QN: 45 U/L — SIGNIFICANT CHANGE UP (ref 13–75)
LYMPHOCYTES # BLD AUTO: 2.53 K/UL — SIGNIFICANT CHANGE UP (ref 1–3.3)
LYMPHOCYTES # BLD AUTO: 29.9 % — SIGNIFICANT CHANGE UP (ref 13–44)
MCHC RBC-ENTMCNC: 27.6 PG — SIGNIFICANT CHANGE UP (ref 27–34)
MCHC RBC-ENTMCNC: 32.4 G/DL — SIGNIFICANT CHANGE UP (ref 32–36)
MCV RBC AUTO: 85.3 FL — SIGNIFICANT CHANGE UP (ref 80–100)
MONOCYTES # BLD AUTO: 0.96 K/UL — HIGH (ref 0–0.9)
MONOCYTES NFR BLD AUTO: 11.4 % — SIGNIFICANT CHANGE UP (ref 2–14)
NEUTROPHILS # BLD AUTO: 4.68 K/UL — SIGNIFICANT CHANGE UP (ref 1.8–7.4)
NEUTROPHILS NFR BLD AUTO: 55.4 % — SIGNIFICANT CHANGE UP (ref 43–77)
PLATELET # BLD AUTO: 352 K/UL — SIGNIFICANT CHANGE UP (ref 150–400)
POTASSIUM SERPL-MCNC: 3.6 MMOL/L — SIGNIFICANT CHANGE UP (ref 3.5–5.3)
POTASSIUM SERPL-SCNC: 3.6 MMOL/L — SIGNIFICANT CHANGE UP (ref 3.5–5.3)
PROT SERPL-MCNC: 7.5 GM/DL — SIGNIFICANT CHANGE UP (ref 6–8.3)
RBC # BLD: 4.96 M/UL — SIGNIFICANT CHANGE UP (ref 4.2–5.8)
RBC # FLD: 13.5 % — SIGNIFICANT CHANGE UP (ref 10.3–14.5)
SODIUM SERPL-SCNC: 135 MMOL/L — SIGNIFICANT CHANGE UP (ref 135–145)
WBC # BLD: 8.45 K/UL — SIGNIFICANT CHANGE UP (ref 3.8–10.5)
WBC # FLD AUTO: 8.45 K/UL — SIGNIFICANT CHANGE UP (ref 3.8–10.5)

## 2024-11-16 PROCEDURE — 76705 ECHO EXAM OF ABDOMEN: CPT

## 2024-11-16 PROCEDURE — 99285 EMERGENCY DEPT VISIT HI MDM: CPT

## 2024-11-16 PROCEDURE — 71046 X-RAY EXAM CHEST 2 VIEWS: CPT

## 2024-11-16 PROCEDURE — 96374 THER/PROPH/DIAG INJ IV PUSH: CPT | Mod: XU

## 2024-11-16 PROCEDURE — 71046 X-RAY EXAM CHEST 2 VIEWS: CPT | Mod: 26

## 2024-11-16 PROCEDURE — 36415 COLL VENOUS BLD VENIPUNCTURE: CPT

## 2024-11-16 PROCEDURE — 83690 ASSAY OF LIPASE: CPT

## 2024-11-16 PROCEDURE — 96375 TX/PRO/DX INJ NEW DRUG ADDON: CPT

## 2024-11-16 PROCEDURE — 80053 COMPREHEN METABOLIC PANEL: CPT

## 2024-11-16 PROCEDURE — 76705 ECHO EXAM OF ABDOMEN: CPT | Mod: 26

## 2024-11-16 PROCEDURE — 99284 EMERGENCY DEPT VISIT MOD MDM: CPT | Mod: 25

## 2024-11-16 PROCEDURE — 85025 COMPLETE CBC W/AUTO DIFF WBC: CPT

## 2024-11-16 RX ORDER — ONDANSETRON HCL/PF 4 MG/2 ML
1 VIAL (ML) INJECTION
Qty: 2 | Refills: 0
Start: 2024-11-16 | End: 2024-11-20

## 2024-11-16 RX ORDER — ACETAMINOPHEN 500 MG/5ML
2 LIQUID (ML) ORAL
Qty: 40 | Refills: 0
Start: 2024-11-16 | End: 2024-11-20

## 2024-11-16 RX ORDER — MAGNESIUM, ALUMINUM HYDROXIDE 200-200 MG
30 TABLET,CHEWABLE ORAL ONCE
Refills: 0 | Status: COMPLETED | OUTPATIENT
Start: 2024-11-16 | End: 2024-11-16

## 2024-11-16 RX ORDER — SUCRALFATE 1 G
1 TABLET ORAL
Qty: 20 | Refills: 0
Start: 2024-11-16 | End: 2024-11-20

## 2024-11-16 RX ORDER — ONDANSETRON HCL/PF 4 MG/2 ML
4 VIAL (ML) INJECTION ONCE
Refills: 0 | Status: COMPLETED | OUTPATIENT
Start: 2024-11-16 | End: 2024-11-16

## 2024-11-16 RX ORDER — OMEPRAZOLE 20 MG/1
1 CAPSULE, DELAYED RELEASE ORAL
Qty: 30 | Refills: 0
Start: 2024-11-16 | End: 2024-12-15

## 2024-11-16 RX ORDER — SUCRALFATE 1 G
1 TABLET ORAL ONCE
Refills: 0 | Status: COMPLETED | OUTPATIENT
Start: 2024-11-16 | End: 2024-11-16

## 2024-11-16 RX ORDER — ACETAMINOPHEN 500 MG/5ML
1000 LIQUID (ML) ORAL ONCE
Refills: 0 | Status: COMPLETED | OUTPATIENT
Start: 2024-11-16 | End: 2024-11-16

## 2024-11-16 RX ADMIN — Medication 1 GRAM(S): at 23:28

## 2024-11-16 RX ADMIN — Medication 400 MILLIGRAM(S): at 23:24

## 2024-11-16 RX ADMIN — Medication 30 MILLILITER(S): at 23:24

## 2024-11-16 RX ADMIN — Medication 4 MILLIGRAM(S): at 21:15

## 2024-11-16 RX ADMIN — Medication 4 MILLIGRAM(S): at 21:16

## 2024-11-21 ENCOUNTER — TRANSCRIPTION ENCOUNTER (OUTPATIENT)
Age: 44
End: 2024-11-21

## 2024-11-22 ENCOUNTER — APPOINTMENT (OUTPATIENT)
Dept: GASTROENTEROLOGY | Facility: CLINIC | Age: 44
End: 2024-11-22
Payer: COMMERCIAL

## 2024-11-22 VITALS
DIASTOLIC BLOOD PRESSURE: 80 MMHG | WEIGHT: 200 LBS | HEIGHT: 68 IN | BODY MASS INDEX: 30.31 KG/M2 | SYSTOLIC BLOOD PRESSURE: 124 MMHG

## 2024-11-22 DIAGNOSIS — R68.81 EARLY SATIETY: ICD-10-CM

## 2024-11-22 DIAGNOSIS — R10.11 RIGHT UPPER QUADRANT PAIN: ICD-10-CM

## 2024-11-22 DIAGNOSIS — R10.9 UNSPECIFIED ABDOMINAL PAIN: ICD-10-CM

## 2024-11-22 PROCEDURE — 99203 OFFICE O/P NEW LOW 30 MIN: CPT

## 2024-11-22 RX ORDER — SUCRALFATE 1 G/1
1 TABLET ORAL
Qty: 360 | Refills: 0 | Status: ACTIVE | COMMUNITY
Start: 2024-11-22 | End: 1900-01-01

## 2024-11-22 RX ORDER — OMEPRAZOLE 40 MG/1
40 CAPSULE, DELAYED RELEASE ORAL
Qty: 30 | Refills: 3 | Status: ACTIVE | COMMUNITY
Start: 2024-11-22 | End: 1900-01-01

## 2024-11-26 ENCOUNTER — APPOINTMENT (OUTPATIENT)
Dept: RHEUMATOLOGY | Facility: CLINIC | Age: 44
End: 2024-11-26
Payer: COMMERCIAL

## 2024-11-26 DIAGNOSIS — K76.0 FATTY (CHANGE OF) LIVER, NOT ELSEWHERE CLASSIFIED: ICD-10-CM

## 2024-11-26 PROCEDURE — 99443: CPT

## 2024-12-13 ENCOUNTER — APPOINTMENT (OUTPATIENT)
Dept: RHEUMATOLOGY | Facility: CLINIC | Age: 44
End: 2024-12-13

## 2024-12-13 VITALS
TEMPERATURE: 98 F | WEIGHT: 200 LBS | HEIGHT: 68 IN | SYSTOLIC BLOOD PRESSURE: 122 MMHG | HEART RATE: 87 BPM | DIASTOLIC BLOOD PRESSURE: 78 MMHG | OXYGEN SATURATION: 97 % | BODY MASS INDEX: 30.31 KG/M2

## 2024-12-13 PROCEDURE — 99215 OFFICE O/P EST HI 40 MIN: CPT

## 2024-12-13 PROCEDURE — G2211 COMPLEX E/M VISIT ADD ON: CPT | Mod: NC

## 2024-12-27 ENCOUNTER — APPOINTMENT (OUTPATIENT)
Dept: RHEUMATOLOGY | Facility: CLINIC | Age: 44
End: 2024-12-27
Payer: COMMERCIAL

## 2024-12-27 VITALS
TEMPERATURE: 96.2 F | SYSTOLIC BLOOD PRESSURE: 124 MMHG | BODY MASS INDEX: 30.31 KG/M2 | HEIGHT: 68 IN | DIASTOLIC BLOOD PRESSURE: 82 MMHG | OXYGEN SATURATION: 97 % | HEART RATE: 93 BPM | WEIGHT: 200 LBS

## 2024-12-27 PROCEDURE — G2211 COMPLEX E/M VISIT ADD ON: CPT | Mod: NC

## 2024-12-27 PROCEDURE — 96372 THER/PROPH/DIAG INJ SC/IM: CPT

## 2024-12-27 PROCEDURE — 99204 OFFICE O/P NEW MOD 45 MIN: CPT | Mod: 25

## 2024-12-27 RX ORDER — TRIAMCINOLONE ACETONIDE 40 MG/ML
40 SUSPENSION INTRA-ARTERIAL; INTRAMUSCULAR
Qty: 1 | Refills: 0 | Status: COMPLETED | OUTPATIENT
Start: 2024-12-27

## 2024-12-27 RX ORDER — TOFACITINIB 11 MG/1
11 TABLET, FILM COATED, EXTENDED RELEASE ORAL
Refills: 0 | Status: ACTIVE | COMMUNITY

## 2024-12-27 RX ADMIN — TRIAMCINOLONE ACETONIDE 0 MG/ML: 40 INJECTION, SUSPENSION INTRA-ARTICULAR; INTRAMUSCULAR at 00:00

## 2025-01-01 PROBLEM — R45.89 ANXIETY ABOUT HEALTH: Status: ACTIVE | Noted: 2023-07-07

## 2025-01-03 ENCOUNTER — APPOINTMENT (OUTPATIENT)
Dept: GASTROENTEROLOGY | Facility: AMBULATORY MEDICAL SERVICES | Age: 45
End: 2025-01-03
Payer: COMMERCIAL

## 2025-01-03 ENCOUNTER — RESULT REVIEW (OUTPATIENT)
Age: 45
End: 2025-01-03

## 2025-01-03 PROCEDURE — 43239 EGD BIOPSY SINGLE/MULTIPLE: CPT

## 2025-01-06 DIAGNOSIS — K29.70 GASTRITIS, UNSPECIFIED, W/OUT BLEEDING: ICD-10-CM

## 2025-01-06 RX ORDER — OMEPRAZOLE MAGNESIUM 20 MG/1
20 TABLET, DELAYED RELEASE ORAL DAILY
Qty: 42 | Refills: 0 | Status: ACTIVE | COMMUNITY
Start: 2025-01-06 | End: 1900-01-01

## 2025-01-08 ENCOUNTER — APPOINTMENT (OUTPATIENT)
Dept: RHEUMATOLOGY | Facility: CLINIC | Age: 45
End: 2025-01-08
Payer: COMMERCIAL

## 2025-01-08 VITALS
DIASTOLIC BLOOD PRESSURE: 80 MMHG | WEIGHT: 200 LBS | TEMPERATURE: 97.6 F | BODY MASS INDEX: 30.31 KG/M2 | OXYGEN SATURATION: 96 % | SYSTOLIC BLOOD PRESSURE: 124 MMHG | HEIGHT: 68 IN | HEART RATE: 92 BPM

## 2025-01-08 DIAGNOSIS — M10.9 GOUT, UNSPECIFIED: ICD-10-CM

## 2025-01-08 DIAGNOSIS — M05.9 RHEUMATOID ARTHRITIS WITH RHEUMATOID FACTOR, UNSPECIFIED: ICD-10-CM

## 2025-01-08 DIAGNOSIS — M25.512 PAIN IN LEFT SHOULDER: ICD-10-CM

## 2025-01-08 DIAGNOSIS — L40.50 ARTHROPATHIC PSORIASIS, UNSPECIFIED: ICD-10-CM

## 2025-01-08 PROCEDURE — 99214 OFFICE O/P EST MOD 30 MIN: CPT

## 2025-01-08 PROCEDURE — G2211 COMPLEX E/M VISIT ADD ON: CPT | Mod: NC

## 2025-01-08 RX ORDER — TRAMADOL HYDROCHLORIDE 50 MG/1
50 TABLET, COATED ORAL
Qty: 90 | Refills: 0 | Status: ACTIVE | COMMUNITY
Start: 2025-01-08 | End: 1900-01-01

## 2025-01-10 RX ORDER — TOFACITINIB 11 MG/1
11 TABLET, FILM COATED, EXTENDED RELEASE ORAL
Qty: 90 | Refills: 0 | Status: ACTIVE | COMMUNITY
Start: 2025-01-08 | End: 1900-01-01

## 2025-01-16 ENCOUNTER — TRANSCRIPTION ENCOUNTER (OUTPATIENT)
Age: 45
End: 2025-01-16

## 2025-01-23 ENCOUNTER — NON-APPOINTMENT (OUTPATIENT)
Age: 45
End: 2025-01-23

## 2025-01-30 ENCOUNTER — RX RENEWAL (OUTPATIENT)
Age: 45
End: 2025-01-30

## 2025-01-30 RX ORDER — OMEPRAZOLE 20 MG/1
20 CAPSULE, DELAYED RELEASE ORAL
Qty: 30 | Refills: 1 | Status: ACTIVE | COMMUNITY
Start: 2025-01-30 | End: 1900-01-01

## 2025-02-11 ENCOUNTER — APPOINTMENT (OUTPATIENT)
Dept: RHEUMATOLOGY | Facility: CLINIC | Age: 45
End: 2025-02-11

## 2025-03-14 ENCOUNTER — APPOINTMENT (OUTPATIENT)
Dept: RHEUMATOLOGY | Facility: CLINIC | Age: 45
End: 2025-03-14
Payer: MEDICARE

## 2025-03-14 VITALS
OXYGEN SATURATION: 97 % | HEIGHT: 68 IN | SYSTOLIC BLOOD PRESSURE: 110 MMHG | TEMPERATURE: 97.6 F | DIASTOLIC BLOOD PRESSURE: 82 MMHG | WEIGHT: 200 LBS | HEART RATE: 96 BPM | BODY MASS INDEX: 30.31 KG/M2

## 2025-03-14 DIAGNOSIS — K11.20 SIALOADENITIS, UNSPECIFIED: ICD-10-CM

## 2025-03-14 DIAGNOSIS — R45.89 OTHER SYMPTOMS AND SIGNS INVOLVING EMOTIONAL STATE: ICD-10-CM

## 2025-03-14 DIAGNOSIS — R53.83 OTHER FATIGUE: ICD-10-CM

## 2025-03-14 DIAGNOSIS — M10.9 GOUT, UNSPECIFIED: ICD-10-CM

## 2025-03-14 DIAGNOSIS — J32.9 CHRONIC SINUSITIS, UNSPECIFIED: ICD-10-CM

## 2025-03-14 DIAGNOSIS — R76.8 OTHER SPECIFIED ABNORMAL IMMUNOLOGICAL FINDINGS IN SERUM: ICD-10-CM

## 2025-03-14 DIAGNOSIS — M05.9 RHEUMATOID ARTHRITIS WITH RHEUMATOID FACTOR, UNSPECIFIED: ICD-10-CM

## 2025-03-14 DIAGNOSIS — R74.8 ABNORMAL LEVELS OF OTHER SERUM ENZYMES: ICD-10-CM

## 2025-03-14 DIAGNOSIS — E88.810 METABOLIC SYNDROME: ICD-10-CM

## 2025-03-14 DIAGNOSIS — D62 ACUTE POSTHEMORRHAGIC ANEMIA: ICD-10-CM

## 2025-03-14 DIAGNOSIS — K76.0 FATTY (CHANGE OF) LIVER, NOT ELSEWHERE CLASSIFIED: ICD-10-CM

## 2025-03-14 DIAGNOSIS — B99.9 UNSPECIFIED INFECTIOUS DISEASE: ICD-10-CM

## 2025-03-14 DIAGNOSIS — G47.33 OBSTRUCTIVE SLEEP APNEA (ADULT) (PEDIATRIC): ICD-10-CM

## 2025-03-14 PROCEDURE — 96372 THER/PROPH/DIAG INJ SC/IM: CPT

## 2025-03-14 PROCEDURE — 99215 OFFICE O/P EST HI 40 MIN: CPT | Mod: 25

## 2025-03-14 RX ORDER — SERTRALINE 25 MG/1
25 TABLET, FILM COATED ORAL DAILY
Qty: 60 | Refills: 2 | Status: ACTIVE | COMMUNITY
Start: 2025-03-14 | End: 1900-01-01

## 2025-03-14 RX ORDER — MONTELUKAST 10 MG/1
10 TABLET, FILM COATED ORAL
Qty: 90 | Refills: 3 | Status: ACTIVE | COMMUNITY
Start: 2025-03-14 | End: 1900-01-01

## 2025-03-14 RX ORDER — TRIAMCINOLONE ACETONIDE 40 MG/ML
40 SUSPENSION INTRA-ARTERIAL; INTRAMUSCULAR
Refills: 0 | Status: COMPLETED | OUTPATIENT
Start: 2025-03-14

## 2025-03-14 RX ADMIN — TRIAMCINOLONE ACETONIDE 0.5 MG/ML: 40 INJECTION, SUSPENSION INTRA-ARTICULAR; INTRAMUSCULAR at 00:00

## 2025-04-07 ENCOUNTER — OUTPATIENT (OUTPATIENT)
Dept: OUTPATIENT SERVICES | Facility: HOSPITAL | Age: 45
LOS: 1 days | End: 2025-04-07
Payer: MEDICARE

## 2025-04-07 ENCOUNTER — APPOINTMENT (OUTPATIENT)
Dept: CT IMAGING | Facility: CLINIC | Age: 45
End: 2025-04-07
Payer: MEDICARE

## 2025-04-07 DIAGNOSIS — Z98.890 OTHER SPECIFIED POSTPROCEDURAL STATES: Chronic | ICD-10-CM

## 2025-04-07 DIAGNOSIS — M05.9 RHEUMATOID ARTHRITIS WITH RHEUMATOID FACTOR, UNSPECIFIED: ICD-10-CM

## 2025-04-07 PROCEDURE — 71250 CT THORAX DX C-: CPT | Mod: 26

## 2025-04-07 PROCEDURE — 71250 CT THORAX DX C-: CPT

## 2025-04-15 ENCOUNTER — APPOINTMENT (OUTPATIENT)
Dept: RHEUMATOLOGY | Facility: CLINIC | Age: 45
End: 2025-04-15
Payer: MEDICARE

## 2025-04-15 DIAGNOSIS — K76.0 FATTY (CHANGE OF) LIVER, NOT ELSEWHERE CLASSIFIED: ICD-10-CM

## 2025-04-15 DIAGNOSIS — F43.23 ADJUSTMENT DISORDER WITH MIXED ANXIETY AND DEPRESSED MOOD: ICD-10-CM

## 2025-04-15 DIAGNOSIS — H04.123 DRY EYE SYNDROME OF BILATERAL LACRIMAL GLANDS: ICD-10-CM

## 2025-04-15 DIAGNOSIS — M05.9 RHEUMATOID ARTHRITIS WITH RHEUMATOID FACTOR, UNSPECIFIED: ICD-10-CM

## 2025-04-15 DIAGNOSIS — M10.9 GOUT, UNSPECIFIED: ICD-10-CM

## 2025-04-15 DIAGNOSIS — M25.552 PAIN IN LEFT HIP: ICD-10-CM

## 2025-04-15 DIAGNOSIS — L40.50 ARTHROPATHIC PSORIASIS, UNSPECIFIED: ICD-10-CM

## 2025-04-15 DIAGNOSIS — J32.9 CHRONIC SINUSITIS, UNSPECIFIED: ICD-10-CM

## 2025-04-15 DIAGNOSIS — G47.33 OBSTRUCTIVE SLEEP APNEA (ADULT) (PEDIATRIC): ICD-10-CM

## 2025-04-15 DIAGNOSIS — R76.8 OTHER SPECIFIED ABNORMAL IMMUNOLOGICAL FINDINGS IN SERUM: ICD-10-CM

## 2025-04-15 DIAGNOSIS — N41.0 ACUTE PROSTATITIS: ICD-10-CM

## 2025-04-15 PROCEDURE — 99214 OFFICE O/P EST MOD 30 MIN: CPT | Mod: 2W

## 2025-04-15 PROCEDURE — G2211 COMPLEX E/M VISIT ADD ON: CPT | Mod: 2W

## 2025-04-15 RX ORDER — ALPRAZOLAM 0.25 MG/1
0.25 TABLET ORAL
Qty: 60 | Refills: 0 | Status: ACTIVE | COMMUNITY
Start: 2025-04-15 | End: 1900-01-01

## 2025-04-15 RX ORDER — BUPROPION HYDROCHLORIDE 75 MG/1
75 TABLET, FILM COATED ORAL
Qty: 60 | Refills: 2 | Status: ACTIVE | COMMUNITY
Start: 2025-04-15 | End: 1900-01-01

## 2025-04-15 RX ORDER — PREDNISONE 5 MG/1
5 TABLET ORAL
Qty: 90 | Refills: 1 | Status: ACTIVE | COMMUNITY
Start: 2025-04-15 | End: 1900-01-01

## 2025-05-08 ENCOUNTER — NON-APPOINTMENT (OUTPATIENT)
Age: 45
End: 2025-05-08

## 2025-05-08 ENCOUNTER — TRANSCRIPTION ENCOUNTER (OUTPATIENT)
Age: 45
End: 2025-05-08

## 2025-05-08 RX ORDER — ESCITALOPRAM OXALATE 5 MG/1
5 TABLET ORAL
Qty: 60 | Refills: 2 | Status: ACTIVE | COMMUNITY
Start: 2025-05-08 | End: 1900-01-01

## 2025-05-22 ENCOUNTER — APPOINTMENT (OUTPATIENT)
Dept: OTOLARYNGOLOGY | Facility: CLINIC | Age: 45
End: 2025-05-22
Payer: MEDICARE

## 2025-05-22 VITALS
WEIGHT: 198 LBS | BODY MASS INDEX: 30.01 KG/M2 | SYSTOLIC BLOOD PRESSURE: 116 MMHG | DIASTOLIC BLOOD PRESSURE: 83 MMHG | HEIGHT: 68 IN

## 2025-05-22 DIAGNOSIS — R10.9 UNSPECIFIED ABDOMINAL PAIN: ICD-10-CM

## 2025-05-22 DIAGNOSIS — J34.2 DEVIATED NASAL SEPTUM: ICD-10-CM

## 2025-05-22 DIAGNOSIS — G47.33 OBSTRUCTIVE SLEEP APNEA (ADULT) (PEDIATRIC): ICD-10-CM

## 2025-05-22 PROCEDURE — 99203 OFFICE O/P NEW LOW 30 MIN: CPT

## 2025-06-02 ENCOUNTER — OUTPATIENT (OUTPATIENT)
Dept: OUTPATIENT SERVICES | Facility: HOSPITAL | Age: 45
LOS: 1 days | End: 2025-06-02
Payer: MEDICARE

## 2025-06-02 DIAGNOSIS — Z98.890 OTHER SPECIFIED POSTPROCEDURAL STATES: Chronic | ICD-10-CM

## 2025-06-02 DIAGNOSIS — G47.33 OBSTRUCTIVE SLEEP APNEA (ADULT) (PEDIATRIC): ICD-10-CM

## 2025-06-02 PROCEDURE — 95800 SLP STDY UNATTENDED: CPT

## 2025-06-02 PROCEDURE — G0400: CPT | Mod: 26

## 2025-06-05 ENCOUNTER — APPOINTMENT (OUTPATIENT)
Dept: RHEUMATOLOGY | Facility: CLINIC | Age: 45
End: 2025-06-05
Payer: MEDICARE

## 2025-06-05 PROCEDURE — 99214 OFFICE O/P EST MOD 30 MIN: CPT | Mod: 93

## 2025-06-06 ENCOUNTER — NON-APPOINTMENT (OUTPATIENT)
Age: 45
End: 2025-06-06

## 2025-07-01 ENCOUNTER — APPOINTMENT (OUTPATIENT)
Dept: RHEUMATOLOGY | Facility: CLINIC | Age: 45
End: 2025-07-01
Payer: MEDICARE

## 2025-07-01 PROCEDURE — G2211 COMPLEX E/M VISIT ADD ON: CPT | Mod: 2W

## 2025-07-01 PROCEDURE — 99213 OFFICE O/P EST LOW 20 MIN: CPT | Mod: 2W

## 2025-07-01 RX ORDER — PERFLUOROHEXYLOCTANE 1 MG/MG
1.34 SOLUTION OPHTHALMIC
Refills: 0 | Status: ACTIVE | COMMUNITY
Start: 2025-07-01

## 2025-08-20 ENCOUNTER — RX RENEWAL (OUTPATIENT)
Age: 45
End: 2025-08-20

## 2025-09-02 ENCOUNTER — APPOINTMENT (OUTPATIENT)
Dept: RHEUMATOLOGY | Facility: CLINIC | Age: 45
End: 2025-09-02
Payer: MEDICARE

## 2025-09-02 VITALS
BODY MASS INDEX: 31.98 KG/M2 | SYSTOLIC BLOOD PRESSURE: 122 MMHG | OXYGEN SATURATION: 95 % | HEIGHT: 68 IN | DIASTOLIC BLOOD PRESSURE: 74 MMHG | HEART RATE: 107 BPM | TEMPERATURE: 97.9 F | WEIGHT: 211 LBS

## 2025-09-02 DIAGNOSIS — M05.9 RHEUMATOID ARTHRITIS WITH RHEUMATOID FACTOR, UNSPECIFIED: ICD-10-CM

## 2025-09-02 DIAGNOSIS — G47.33 OBSTRUCTIVE SLEEP APNEA (ADULT) (PEDIATRIC): ICD-10-CM

## 2025-09-02 PROCEDURE — G2211 COMPLEX E/M VISIT ADD ON: CPT

## 2025-09-02 PROCEDURE — 99214 OFFICE O/P EST MOD 30 MIN: CPT
